# Patient Record
Sex: FEMALE | Race: WHITE | NOT HISPANIC OR LATINO | Employment: PART TIME | URBAN - METROPOLITAN AREA
[De-identification: names, ages, dates, MRNs, and addresses within clinical notes are randomized per-mention and may not be internally consistent; named-entity substitution may affect disease eponyms.]

---

## 2017-01-05 ENCOUNTER — OFFICE VISIT (OUTPATIENT)
Dept: URGENT CARE | Facility: PHYSICIAN GROUP | Age: 37
End: 2017-01-05
Payer: COMMERCIAL

## 2017-01-05 VITALS
DIASTOLIC BLOOD PRESSURE: 80 MMHG | WEIGHT: 170 LBS | HEIGHT: 65 IN | OXYGEN SATURATION: 98 % | TEMPERATURE: 97.8 F | RESPIRATION RATE: 16 BRPM | SYSTOLIC BLOOD PRESSURE: 128 MMHG | HEART RATE: 75 BPM | BODY MASS INDEX: 28.32 KG/M2

## 2017-01-05 DIAGNOSIS — J02.9 EXUDATIVE PHARYNGITIS: ICD-10-CM

## 2017-01-05 PROCEDURE — 99214 OFFICE O/P EST MOD 30 MIN: CPT | Performed by: NURSE PRACTITIONER

## 2017-01-05 RX ORDER — AMOXICILLIN 500 MG/1
500 CAPSULE ORAL 3 TIMES DAILY
Qty: 30 CAP | Refills: 0 | Status: SHIPPED | OUTPATIENT
Start: 2017-01-05 | End: 2018-06-15

## 2017-01-05 ASSESSMENT — ENCOUNTER SYMPTOMS
FEVER: 0
MYALGIAS: 0
COUGH: 0
SWOLLEN GLANDS: 1
STRIDOR: 0
DIARRHEA: 0
WEAKNESS: 1
SPUTUM PRODUCTION: 0
SORE THROAT: 1
TROUBLE SWALLOWING: 1
CHILLS: 0
SHORTNESS OF BREATH: 0
VOMITING: 0
NAUSEA: 0

## 2017-01-05 NOTE — Clinical Note
January 5, 2017       Patient: Pebbles Cosme   YOB: 1980   Date of Visit: 1/5/2017         To Whom It May Concern:    It is my medical opinion that Pebbles Cosme should be off work for 2 days due to illness.     If you have any questions or concerns, please don't hesitate to call [phone]          Sincerely,          PIA Parry.  Electronically Signed

## 2017-01-06 ENCOUNTER — HOSPITAL ENCOUNTER (OUTPATIENT)
Dept: LAB | Facility: MEDICAL CENTER | Age: 37
End: 2017-01-06
Attending: PSYCHIATRY & NEUROLOGY
Payer: COMMERCIAL

## 2017-01-06 DIAGNOSIS — F41.8 SITUATIONAL ANXIETY: ICD-10-CM

## 2017-01-06 PROCEDURE — 80053 COMPREHEN METABOLIC PANEL: CPT

## 2017-01-06 PROCEDURE — 84439 ASSAY OF FREE THYROXINE: CPT

## 2017-01-06 PROCEDURE — 84443 ASSAY THYROID STIM HORMONE: CPT

## 2017-01-06 PROCEDURE — 83036 HEMOGLOBIN GLYCOSYLATED A1C: CPT

## 2017-01-06 PROCEDURE — 85025 COMPLETE CBC W/AUTO DIFF WBC: CPT

## 2017-01-06 PROCEDURE — 36415 COLL VENOUS BLD VENIPUNCTURE: CPT

## 2017-01-06 PROCEDURE — 80061 LIPID PANEL: CPT

## 2017-01-06 NOTE — PATIENT INSTRUCTIONS

## 2017-01-06 NOTE — PROGRESS NOTES
"Subjective:      Pebbles Cosme is a 36 y.o. female who presents with Pharyngitis            HPI Comments: Medications, Allergies and Prior Medical Hx reviewed and updated in [facility].with patient/family today         Pharyngitis   This is a new problem. The current episode started in the past 7 days (2 days). The problem has been unchanged. Maximum temperature: subjective fevers. The pain is at a severity of 5/10. The pain is moderate. Associated symptoms include congestion, ear pain, swollen glands and trouble swallowing. Pertinent negatives include no coughing, diarrhea, ear discharge, shortness of breath, stridor or vomiting. She has tried NSAIDs for the symptoms.       Review of Systems   Constitutional: Positive for malaise/fatigue. Negative for fever and chills.   HENT: Positive for congestion, ear pain, sore throat and trouble swallowing. Negative for ear discharge.    Respiratory: Negative for cough, sputum production, shortness of breath and stridor.    Gastrointestinal: Negative for nausea, vomiting and diarrhea.   Musculoskeletal: Negative for myalgias.   Neurological: Positive for weakness.          Objective:     /80 mmHg  Pulse 75  Temp(Src) 36.6 °C (97.8 °F)  Resp 16  Ht 1.651 m (5' 5\")  Wt 77.111 kg (170 lb)  BMI 28.29 kg/m2  SpO2 98%     Physical Exam   Constitutional: She appears well-developed and well-nourished. No distress.   HENT:   Head: Normocephalic and atraumatic.   Right Ear: Tympanic membrane and ear canal normal.   Left Ear: Tympanic membrane and ear canal normal.   Nose: Rhinorrhea present.   Mouth/Throat: Uvula is midline and mucous membranes are normal. No trismus in the jaw. No uvula swelling. Oropharyngeal exudate, posterior oropharyngeal edema and posterior oropharyngeal erythema present. No tonsillar abscesses.   Eyes: Conjunctivae are normal. Pupils are equal, round, and reactive to light.   Neck: Neck supple.   Cardiovascular: Normal rate, regular rhythm and " normal heart sounds.    Pulmonary/Chest: Effort normal and breath sounds normal. No respiratory distress. She has no wheezes.   Lymphadenopathy:     She has cervical adenopathy.   Neurological: She is alert.   Skin: Skin is warm and dry.   Psychiatric: She has a normal mood and affect. Her behavior is normal.   Vitals reviewed.              Assessment/Plan:     1. Exudative pharyngitis  amoxicillin (AMOXIL) 500 MG Cap       Rapid Strep - neg    D/w pt neg strep swab but sx are compatible with strep; will treat for strep    Letter written for 2 days off of school/work    Epic generated written imformation provided along with verbal instructions for pharyngitis    Rest, Fluids, tylenol, ibuprofen, otc throat lozenges, gargle with warm salt water,   Pt will go to the ER for worsening or changing symptoms as discussed,  Follow-up with your primary care provider or return here if not improving in 5 days   Discharge instructions discussed with pt/family who verbalize understanding and agreement with poc

## 2017-01-07 LAB
ALBUMIN SERPL BCP-MCNC: 4 G/DL (ref 3.2–4.9)
ALBUMIN/GLOB SERPL: 1.7 G/DL
ALP SERPL-CCNC: 49 U/L (ref 30–99)
ALT SERPL-CCNC: 12 U/L (ref 2–50)
ANION GAP SERPL CALC-SCNC: 3 MMOL/L (ref 0–11.9)
AST SERPL-CCNC: 15 U/L (ref 12–45)
BASOPHILS # BLD AUTO: 0.05 K/UL (ref 0–0.12)
BASOPHILS NFR BLD AUTO: 0.6 % (ref 0–1.8)
BILIRUB SERPL-MCNC: 0.2 MG/DL (ref 0.1–1.5)
BUN SERPL-MCNC: 15 MG/DL (ref 8–22)
CALCIUM SERPL-MCNC: 8.6 MG/DL (ref 8.5–10.5)
CHLORIDE SERPL-SCNC: 109 MMOL/L (ref 96–112)
CHOLEST SERPL-MCNC: 150 MG/DL (ref 100–199)
CO2 SERPL-SCNC: 25 MMOL/L (ref 20–33)
CREAT SERPL-MCNC: 0.65 MG/DL (ref 0.5–1.4)
EOSINOPHIL # BLD: 0.13 K/UL (ref 0–0.51)
EOSINOPHIL NFR BLD AUTO: 1.6 % (ref 0–6.9)
ERYTHROCYTE [DISTWIDTH] IN BLOOD BY AUTOMATED COUNT: 44.3 FL (ref 35.9–50)
EST. AVERAGE GLUCOSE BLD GHB EST-MCNC: 103 MG/DL
GLOBULIN SER CALC-MCNC: 2.4 G/DL (ref 1.9–3.5)
GLUCOSE SERPL-MCNC: 99 MG/DL (ref 65–99)
HBA1C MFR BLD: 5.2 % (ref 0–5.6)
HCT VFR BLD AUTO: 40.4 % (ref 37–47)
HDLC SERPL-MCNC: 73 MG/DL
HGB BLD-MCNC: 12.9 G/DL (ref 12–16)
IMM GRANULOCYTES # BLD AUTO: 0.03 K/UL (ref 0–0.11)
IMM GRANULOCYTES NFR BLD AUTO: 0.4 % (ref 0–0.9)
LDLC SERPL CALC-MCNC: 70 MG/DL
LYMPHOCYTES # BLD: 2.74 K/UL (ref 1–4.8)
LYMPHOCYTES NFR BLD AUTO: 34.3 % (ref 22–41)
MCH RBC QN AUTO: 31.6 PG (ref 27–33)
MCHC RBC AUTO-ENTMCNC: 31.9 G/DL (ref 33.6–35)
MCV RBC AUTO: 99 FL (ref 81.4–97.8)
MONOCYTES # BLD: 0.53 K/UL (ref 0–0.85)
MONOCYTES NFR BLD AUTO: 6.6 % (ref 0–13.4)
NEUTROPHILS # BLD: 4.52 K/UL (ref 2–7.15)
NEUTROPHILS NFR BLD AUTO: 56.5 % (ref 44–72)
NRBC # BLD AUTO: 0 K/UL
NRBC BLD-RTO: 0 /100 WBC
PLATELET # BLD AUTO: 318 K/UL (ref 164–446)
PMV BLD AUTO: 9.6 FL (ref 9–12.9)
POTASSIUM SERPL-SCNC: 4.5 MMOL/L (ref 3.6–5.5)
PROT SERPL-MCNC: 6.4 G/DL (ref 6–8.2)
RBC # BLD AUTO: 4.08 M/UL (ref 4.2–5.4)
SODIUM SERPL-SCNC: 137 MMOL/L (ref 135–145)
T4 FREE SERPL-MCNC: 0.95 NG/DL (ref 0.53–1.43)
TRIGL SERPL-MCNC: 35 MG/DL (ref 0–149)
TSH SERPL DL<=0.005 MIU/L-ACNC: 0.92 UIU/ML (ref 0.3–3.7)
WBC # BLD AUTO: 8 K/UL (ref 4.8–10.8)

## 2017-01-13 ENCOUNTER — APPOINTMENT (OUTPATIENT)
Dept: BEHAVIORAL HEALTH | Facility: PHYSICIAN GROUP | Age: 37
End: 2017-01-13
Payer: COMMERCIAL

## 2017-01-27 ENCOUNTER — OFFICE VISIT (OUTPATIENT)
Dept: BEHAVIORAL HEALTH | Facility: PHYSICIAN GROUP | Age: 37
End: 2017-01-27
Payer: COMMERCIAL

## 2017-01-27 DIAGNOSIS — F41.8 SITUATIONAL ANXIETY: ICD-10-CM

## 2017-01-27 PROCEDURE — 99213 OFFICE O/P EST LOW 20 MIN: CPT | Performed by: PSYCHIATRY & NEUROLOGY

## 2017-01-27 RX ORDER — BUSPIRONE HYDROCHLORIDE 10 MG/1
10 TABLET ORAL 3 TIMES DAILY
Qty: 120 TAB | Refills: 3 | Status: SHIPPED | OUTPATIENT
Start: 2017-01-27 | End: 2017-04-03 | Stop reason: SDUPTHER

## 2017-01-27 RX ORDER — ALPRAZOLAM 0.25 MG/1
0.25 TABLET ORAL 2 TIMES DAILY PRN
Qty: 30 TAB | Refills: 1 | Status: SHIPPED | OUTPATIENT
Start: 2017-01-27 | End: 2017-05-05 | Stop reason: SDUPTHER

## 2017-01-27 NOTE — MR AVS SNAPSHOT
Pebbles Vo Ba   2017 9:00 AM   Office Visit   MRN: [id number]    Department:  [facility]   Dept Phone:  [phone]    Description:  Female : 1980   Provider:  Isaias Mckeon M.D.           Reason for Visit     Follow-Up been busy      Allergies as of 2017     No Known Allergies      You were diagnosed with     Situational anxiety   [335847]         Vital Signs     Smoking Status                   Light Tobacco Smoker           Basic Information     Date Of Birth Sex Race Ethnicity Preferred Language    1980 Female White Non- English      Problem List              ICD-10-CM Priority Class Noted - Resolved    H/O LEEP Z98.890   2015 - Present    Family hx of ovarian malignancy Z80.41   2015 - Present    BMI 31.0-31.9,adult Z68.31   2015 - Present    Decreased energy R53.83   2015 - Present    Insomnia G47.00   2015 - Present    Anxiety F41.9   2015 - Present      Health Maintenance        Date Due Completion Dates    IMM DTaP/Tdap/Td Vaccine (1 - Tdap) 1997 ---    IMM INFLUENZA (1) 2016 ---    PAP SMEAR 2017 (Done)    Override on 2014: Done            Current Immunizations     No immunizations on file.      Below and/or attached are the medications your provider expects you to take. Review all of your home medications and newly ordered medications with your provider and/or pharmacist. Follow medication instructions as directed by your provider and/or pharmacist. Please keep your medication list with you and share with your provider. Update the information when medications are discontinued, doses are changed, or new medications (including over-the-counter products) are added; and carry medication information at all times in the event of emergency situations     Allergies:  No Known Allergies          Medications  Valid as of: 2017 -  9:43 AM    Generic Name Brand Name Tablet Size Instructions for use       ALPRAZolam (Tab) XANAX 0.25 MG Take 1 Tab by mouth 2 times a day as needed for Sleep.        Amoxicillin (Cap) AMOXIL 500 MG Take 1 Cap by mouth 3 times a day.        BusPIRone HCl (Tab) BUSPAR 10 MG Take 1 Tab by mouth 3 times a day.        DPH-Lido-AlHydr-MgHydr-Simeth (Suspension) MAGIC MOUTHWASH BLM  10ml gargle and spit every 3 hours as needed. 1/1/1. May substitute.        Terbinafine HCl (Cream) LAMISIL 1 % Apply to affected area twice daily until clear        .                 Medicines prescribed today were sent to:     [id number]  [place], [place] - [address] AT [facility]    [address] [facility] [address]    Phone: [phone] Fax: [phone]    Open 24 Hours?: No      Medication refill instructions:       If your prescription bottle indicates you have medication refills left, it is not necessary to call your provider’s office. Please contact your pharmacy and they will refill your medication.    If your prescription bottle indicates you do not have any refills left, you may request refills at any time through one of the following ways: The online [employer] system (except Urgent Care), by calling your provider’s office, or by asking your pharmacy to contact your provider’s office with a refill request. Medication refills are processed only during regular business hours and may not be available until the next business day. Your provider may request additional information or to have a follow-up visit with you prior to refilling your medication.   *Please Note: Medication refills are assigned a new Rx number when refilled electronically. Your pharmacy may indicate that no refills were authorized even though a new prescription for the same medication is available at the pharmacy. Please request the medicine by name with the pharmacy before contacting your provider for a refill.           [employer] Access Code: Activation code not generated  Current [employer] Status:  Active

## 2017-01-27 NOTE — PROGRESS NOTES
"[facility]  PSYCHIATRIC FOLLOW-UP NOTE    Name: Pebbles Cosme  MRN: [id number]  : 1980  Age: 36 y.o.  Date of assessment: 2017  PCP: Avel Worthy M.D.  Persons in attendance: Patient    REASON FOR VISIT/CHIEF COMPLAINT (as stated by Patient):  Pebbles Cosme is a 36 y.o., White female, attending follow-up appointment for   Chief Complaint   Patient presents with   • Follow-Up     been busy   .      HISTORY OF PRESENT ILLNESS:    Pt is being tx for situational anxiety, compliant with meds, denies SE.  Pt reports the buspar has been quite helpful with the anxiety.  She notes the anxiety is much better these days.  She reports she only takes a 1/2 tab in the morning, 1/2 in the afternoon, and 2tabs in the QHS.  She has been taking less Xanax 0.25 tabs.  She notes she has not typically need the second dose of the Xanax.  She has even taken the one daily to 1/2 tab on a few days this month.  Pt discussed her progress and would like to get to a point where she only need a few doses per month.  Sleep has been improved, notes being so busy at work, just sleeps.  Good appetite.     PSYCHOSOCIAL CHANGES SINCE PREVIOUS CONTACT:  No change    RESPONSE TO TREATMENT:  Improved anxiety with the Buspar    MEDICATION SIDE EFFECTS:  Possible \"mood swings\" some days, but will monitor    REVIEW OF SYSTEMS:        Constitutional negative   Eyes negative   Ears/Nose/Mouth/Throat negative   Cardiovascular negative   Respiratory negative   Gastrointestinal negative   Genitourinary negative   Muscular negative   Integumentary negative   Neurological negative   Endocrine negative   Hematologic/Lymphatic negative       PSYCHIATRIC EXAMINATION/MENTAL STATUS  There were no vitals taken for this visit.  Participation: Active verbal participation  Grooming:Neat  Orientation: Alert and Fully Oriented  Eye contact: Good  Behavior:Calm   Mood: Euthymic  Affect: Flexible and Congruent with content  Thought " "process: Logical and Goal-directed  Thought content:  Within normal limits  Speech: Rate within normal limits and Volume within normal limits  Perception:  Within normal limits  Memory:  No gross evidence of memory deficits  Insight: Good  Judgment: Good  Family/couple interaction observations:   Other:    Current risk:    Suicide: Low   Homicide: Low   Self-harm: Low  Relapse: Low  Other:   Crisis Safety Plan reviewed?Yes  If evidence of imminent risk is present, intervention/plan:    Medical Records/Labs/Diagnostic Tests Reviewed: 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 Situational anxiety           ASSESSMENT AND PLAN:  #Situational Anxiety  Pt reports improvement with the multi-dosing during the day.  Pt typically takes 1/2 tab in the morning and afternnon and then 2 tabs PO QHS.  She notes improvement in overall anxiety.  She has decreased her need of Xanax to one tab or less daily.  Will continue the Buspar at 40mg total daily (pt will only take 30mg at work because it is \"better to be a little anxious\").  Pt takes 40mg daily when not at work.  Will monitor pt progress.  She did get the ACT book, but not yet reading it.  She is employing meditation at work.        More than 50% of face-to-face time in this 30 minute visit was not spent in psychotherapy/counseling.    Topics addressed include:    Isaias Mckeon M.D.                   "

## 2017-04-03 ENCOUNTER — OFFICE VISIT (OUTPATIENT)
Dept: BEHAVIORAL HEALTH | Facility: PHYSICIAN GROUP | Age: 37
End: 2017-04-03
Payer: COMMERCIAL

## 2017-04-03 DIAGNOSIS — F41.8 SITUATIONAL ANXIETY: ICD-10-CM

## 2017-04-03 PROCEDURE — 99214 OFFICE O/P EST MOD 30 MIN: CPT | Performed by: PSYCHIATRY & NEUROLOGY

## 2017-04-03 RX ORDER — BUSPIRONE HYDROCHLORIDE 10 MG/1
10 TABLET ORAL 3 TIMES DAILY
Qty: 120 TAB | Refills: 3 | Status: SHIPPED | OUTPATIENT
Start: 2017-04-03 | End: 2017-05-19 | Stop reason: SDUPTHER

## 2017-04-03 NOTE — MR AVS SNAPSHOT
Pebbles Vo Ba   4/3/2017 3:30 PM   Office Visit   MRN: [id number]    Department:  [facility]   Dept Phone:  [phone]    Description:  Female : 1980   Provider:  Isaias Mckeon M.D.           Reason for Visit     Follow-Up hanging in there, doing ok      Allergies as of 4/3/2017     No Known Allergies      You were diagnosed with     Situational anxiety   [[id number]]         Vital Signs     Smoking Status                   Light Tobacco Smoker           Basic Information     Date Of Birth Sex Race Ethnicity Preferred Language    1980 Female White Non- English      Problem List              ICD-10-CM Priority Class Noted - Resolved    H/O LEEP Z98.890   2015 - Present    Family hx of ovarian malignancy Z80.41   2015 - Present    BMI 31.0-31.9,adult Z68.31   2015 - Present    Decreased energy R53.83   2015 - Present    Insomnia G47.00   2015 - Present    Anxiety F41.9   2015 - Present      Health Maintenance        Date Due Completion Dates    IMM DTaP/Tdap/Td Vaccine (1 - Tdap) 1997 ---    PAP SMEAR 2017 (Done)    Override on 2014: Done            Current Immunizations     No immunizations on file.      Below and/or attached are the medications your provider expects you to take. Review all of your home medications and newly ordered medications with your provider and/or pharmacist. Follow medication instructions as directed by your provider and/or pharmacist. Please keep your medication list with you and share with your provider. Update the information when medications are discontinued, doses are changed, or new medications (including over-the-counter products) are added; and carry medication information at all times in the event of emergency situations     Allergies:  No Known Allergies          Medications  Valid as of: 2017 -  4:12 PM    Generic Name Brand Name Tablet Size Instructions for use    ALPRAZolam (Tab)  XANAX 0.25 MG Take 1 Tab by mouth 2 times a day as needed for Sleep.        Amoxicillin (Cap) AMOXIL 500 MG Take 1 Cap by mouth 3 times a day.        BusPIRone HCl (Tab) BUSPAR 10 MG Take 1 Tab by mouth 3 times a day.        DPH-Lido-AlHydr-MgHydr-Simeth (Suspension) MAGIC MOUTHWASH BLM  10ml gargle and spit every 3 hours as needed. 1/1/1. May substitute.        Terbinafine HCl (Cream) LAMISIL 1 % Apply to affected area twice daily until clear        .                 Medicines prescribed today were sent to:     [id number]  [place], [place] - [address] AT [facility]    [address] [place] [address]    Phone: [phone] Fax: [phone]    Open 24 Hours?: No      Medication refill instructions:       If your prescription bottle indicates you have medication refills left, it is not necessary to call your provider’s office. Please contact your pharmacy and they will refill your medication.    If your prescription bottle indicates you do not have any refills left, you may request refills at any time through one of the following ways: The online [employer] system (except Urgent Care), by calling your provider’s office, or by asking your pharmacy to contact your provider’s office with a refill request. Medication refills are processed only during regular business hours and may not be available until the next business day. Your provider may request additional information or to have a follow-up visit with you prior to refilling your medication.   *Please Note: Medication refills are assigned a new Rx number when refilled electronically. Your pharmacy may indicate that no refills were authorized even though a new prescription for the same medication is available at the pharmacy. Please request the medicine by name with the pharmacy before contacting your provider for a refill.        Your To Do List     Future Labs/Procedures Complete By Expires    CBC WITH DIFFERENTIAL  As directed  4/3/2018         [employer] Access Code: Activation code not generated  Current [employer] Status: Active          Quit Tobacco Information     Do you want to quit using tobacco?    Quitting tobacco decreases risks of cancer, heart and lung disease, increases life expectancy, improves sense of taste and smell, and increases spending money, among other benefits.    If you are thinking about quitting, we can help.  • [facility] Quit Tobacco Program: 292-368-5270  o Program occurs weekly for four weeks and includes pharmacist consultation on products to support quitting smoking or chewing tobacco. A provider referral is needed for pharmacist consultation.  • Tobacco Users Help Hotline: 5-800QUIT-NOW (862-0694) or https://[place].quitlogix.org/  o Free, confidential telephone and online coaching for [place] residents. Sessions are designed on a schedule that is convenient for you. Eligible clients receive free nicotine replacement therapy.  • Nationally: www.smokefree.gov  o Information and professional assistance to support both immediate and long-term needs as you become, and remain, a non-smoker. Smokefree.gov allows you to choose the help that best fits your needs.

## 2017-04-03 NOTE — PROGRESS NOTES
"[facility]  PSYCHIATRIC FOLLOW-UP NOTE    Name: Pebbles Cosme  MRN: [id number]  : 1980  Age: 36 y.o.  Date of assessment: 4/3/2017  PCP: Avel Worthy M.D.  Persons in attendance: Patient    REASON FOR VISIT/CHIEF COMPLAINT (as stated by Patient):  Pebbles Cosme is a 36 y.o., White female, attending follow-up appointment for   Chief Complaint   Patient presents with   • Follow-Up     hanging in there, doing ok   .      HISTORY OF PRESENT ILLNESS:    Pt being tx for situational anxiety.  Pt reports she has been doing pretty well with the current system of alternating the Buspar dosing depending on whether is a work or not.  She uses 30mg total on a work day and 40mg on a non-work day.  She cites being \"too relaxed\" on the 40mg dose for a workday. Notes sleeping pretty well, sleeps through the night. Appetite has been \"fine\", weight has been stable.   Pt has been working on making sure she is snacking better during the day.  Pt notes her FitBit records 28,000 steps per day.      PSYCHOSOCIAL CHANGES SINCE PREVIOUS CONTACT:  No change    RESPONSE TO TREATMENT:  Continues to do    MEDICATION SIDE EFFECTS:  Denies    REVIEW OF SYSTEMS:        Constitutional negative   Eyes negative   Ears/Nose/Mouth/Throat negative   Cardiovascular negative   Respiratory negative   Gastrointestinal negative   Genitourinary negative   Muscular negative   Integumentary negative   Neurological negative   Endocrine negative   Hematologic/Lymphatic negative       PSYCHIATRIC EXAMINATION/MENTAL STATUS  There were no vitals taken for this visit.  Participation: Active verbal participation  Grooming:Neat  Orientation: Alert and Fully Oriented  Eye contact: Good  Behavior:Calm   Mood: Euthymic  Affect: Flexible and Congruent with content  Thought process: Logical and Goal-directed  Thought content:  Within normal limits  Speech: Rate within normal limits and Volume within normal limits  Perception:  Within " "normal limits  Memory:  No gross evidence of memory deficits  Insight: Good  Judgment: Good  Family/couple interaction observations:   Other:    Current risk:    Suicide: Low   Homicide: Low   Self-harm: Low  Relapse: Low  Other:   Crisis Safety Plan reviewed?Yes  If evidence of imminent risk is present, intervention/plan:    Medical Records/Labs/Diagnostic Tests Reviewed: Jan 2017, megolblastic anemia    Medical Records/Labs/Diagnostic Tests Ordered: recheck CBC and Vit D.    DIAGNOSTIC IMPRESSION(S):  1. Situational anxiety           ASSESSMENT AND PLAN:  #Situational Anxiety  Pt reports she continues to do well with the multi-dosing during the day.  Pt typically takes Buspar 30mg PO Daily at work but 40mg when she is not at work.  She notesis because she feels too relaxed at work on the higher dose.  She has decreased her need of Xanax 0.25mg tabs to one tab or less daily.  Will continue the Buspar at 40mg total daily (pt will only take 30mg at work because it is \"better to be a little anxious\").   Will monitor pt progress.   She continues employing meditation at work.       More than 50% of face-to-face time in this 30 minute visit was not spent in psychotherapy/counseling.    Topics addressed include:    Isaias Mckeon M.D.                   "

## 2017-04-04 ENCOUNTER — OFFICE VISIT (OUTPATIENT)
Dept: URGENT CARE | Facility: PHYSICIAN GROUP | Age: 37
End: 2017-04-04
Payer: COMMERCIAL

## 2017-04-04 ENCOUNTER — HOSPITAL ENCOUNTER (OUTPATIENT)
Dept: LAB | Facility: MEDICAL CENTER | Age: 37
End: 2017-04-04
Attending: PSYCHIATRY & NEUROLOGY
Payer: COMMERCIAL

## 2017-04-04 ENCOUNTER — HOSPITAL ENCOUNTER (OUTPATIENT)
Dept: LAB | Facility: MEDICAL CENTER | Age: 37
End: 2017-04-04
Attending: OBSTETRICS & GYNECOLOGY
Payer: COMMERCIAL

## 2017-04-04 VITALS
DIASTOLIC BLOOD PRESSURE: 78 MMHG | BODY MASS INDEX: 29.16 KG/M2 | TEMPERATURE: 98.1 F | HEART RATE: 60 BPM | OXYGEN SATURATION: 100 % | HEIGHT: 65 IN | WEIGHT: 175 LBS | SYSTOLIC BLOOD PRESSURE: 126 MMHG

## 2017-04-04 DIAGNOSIS — J06.9 ACUTE URI: ICD-10-CM

## 2017-04-04 DIAGNOSIS — J02.9 SORE THROAT: Primary | ICD-10-CM

## 2017-04-04 DIAGNOSIS — F41.8 SITUATIONAL ANXIETY: ICD-10-CM

## 2017-04-04 LAB
25(OH)D3 SERPL-MCNC: 11 NG/ML (ref 30–100)
BASOPHILS # BLD AUTO: 0.8 % (ref 0–1.8)
BASOPHILS # BLD: 0.06 K/UL (ref 0–0.12)
EOSINOPHIL # BLD AUTO: 0.14 K/UL (ref 0–0.51)
EOSINOPHIL NFR BLD: 1.8 % (ref 0–6.9)
ERYTHROCYTE [DISTWIDTH] IN BLOOD BY AUTOMATED COUNT: 42.5 FL (ref 35.9–50)
HBV SURFACE AG SER QL: NEGATIVE
HCT VFR BLD AUTO: 41.3 % (ref 37–47)
HCV AB SER QL: NEGATIVE
HGB BLD-MCNC: 13.5 G/DL (ref 12–16)
HIV 1+2 AB+HIV1 P24 AG SERPL QL IA: NON REACTIVE
IMM GRANULOCYTES # BLD AUTO: 0.03 K/UL (ref 0–0.11)
IMM GRANULOCYTES NFR BLD AUTO: 0.4 % (ref 0–0.9)
INT CON NEG: NEGATIVE
INT CON POS: POSITIVE
LYMPHOCYTES # BLD AUTO: 2.64 K/UL (ref 1–4.8)
LYMPHOCYTES NFR BLD: 33.9 % (ref 22–41)
MCH RBC QN AUTO: 31.5 PG (ref 27–33)
MCHC RBC AUTO-ENTMCNC: 32.7 G/DL (ref 33.6–35)
MCV RBC AUTO: 96.3 FL (ref 81.4–97.8)
MONOCYTES # BLD AUTO: 0.41 K/UL (ref 0–0.85)
MONOCYTES NFR BLD AUTO: 5.3 % (ref 0–13.4)
NEUTROPHILS # BLD AUTO: 4.5 K/UL (ref 2–7.15)
NEUTROPHILS NFR BLD: 57.8 % (ref 44–72)
NRBC # BLD AUTO: 0 K/UL
NRBC BLD AUTO-RTO: 0 /100 WBC
PLATELET # BLD AUTO: 331 K/UL (ref 164–446)
PMV BLD AUTO: 9.5 FL (ref 9–12.9)
RBC # BLD AUTO: 4.29 M/UL (ref 4.2–5.4)
S PYO AG THROAT QL: NORMAL
TREPONEMA PALLIDUM IGG+IGM AB [PRESENCE] IN SERUM OR PLASMA BY IMMUNOASSAY: NON REACTIVE
WBC # BLD AUTO: 7.8 K/UL (ref 4.8–10.8)

## 2017-04-04 PROCEDURE — 99214 OFFICE O/P EST MOD 30 MIN: CPT | Performed by: PHYSICIAN ASSISTANT

## 2017-04-04 PROCEDURE — 87880 STREP A ASSAY W/OPTIC: CPT | Performed by: PHYSICIAN ASSISTANT

## 2017-04-04 PROCEDURE — 87340 HEPATITIS B SURFACE AG IA: CPT

## 2017-04-04 PROCEDURE — 86780 TREPONEMA PALLIDUM: CPT

## 2017-04-04 PROCEDURE — 82306 VITAMIN D 25 HYDROXY: CPT

## 2017-04-04 PROCEDURE — 85025 COMPLETE CBC W/AUTO DIFF WBC: CPT

## 2017-04-04 PROCEDURE — 87389 HIV-1 AG W/HIV-1&-2 AB AG IA: CPT

## 2017-04-04 PROCEDURE — 36415 COLL VENOUS BLD VENIPUNCTURE: CPT

## 2017-04-04 PROCEDURE — 86803 HEPATITIS C AB TEST: CPT

## 2017-04-04 ASSESSMENT — ENCOUNTER SYMPTOMS
FEVER: 0
MYALGIAS: 1
SPUTUM PRODUCTION: 0
COUGH: 1
HOARSE VOICE: 1
TROUBLE SWALLOWING: 0
SORE THROAT: 1
SWOLLEN GLANDS: 1

## 2017-04-04 NOTE — Clinical Note
April 4, 2017         Patient: Pebbles Cosme   YOB: 1980   Date of Visit: 4/4/2017           To Whom it May Concern:    Pebbles Cosme was seen in my clinic on 4/4/2017. She may return to work on 04/06/2017.    If you have any questions or concerns, please don't hesitate to call.        Sincerely,           Ana Rodriguez PA-C  Electronically Signed

## 2017-04-04 NOTE — PROGRESS NOTES
"Subjective:      Pebbles Cosme is a 36 y.o. female who presents with Pharyngitis    Pt PMH, SocHx, SurgHx, FamHx, Drug allergies and medications reviewed with pt/EPIC.      Family history reviewed, it is not pertinent to this complaint.            HPI Comments: Patient presents with:  Pharyngitis: sore throat, cough x 3 days        Pharyngitis   This is a new problem. The current episode started in the past 7 days. The problem has been unchanged. There has been no fever. The pain is moderate. Associated symptoms include congestion, coughing, a hoarse voice, a plugged ear sensation and swollen glands. Pertinent negatives include no trouble swallowing. She has tried NSAIDs and cool liquids for the symptoms. The treatment provided mild relief.       Review of Systems   Constitutional: Positive for malaise/fatigue. Negative for fever.   HENT: Positive for congestion, hoarse voice and sore throat. Negative for trouble swallowing.    Respiratory: Positive for cough. Negative for sputum production.    Musculoskeletal: Positive for myalgias.   All other systems reviewed and are negative.         Objective:     /78 mmHg  Pulse 60  Temp(Src) 36.7 °C (98.1 °F)  Ht 1.651 m (5' 5\")  Wt 79.379 kg (175 lb)  BMI 29.12 kg/m2  SpO2 100%     Physical Exam   Constitutional: She is oriented to person, place, and time. She appears well-developed and well-nourished.   HENT:   Head: Normocephalic and atraumatic.   Right Ear: Tympanic membrane normal.   Left Ear: Tympanic membrane normal.   Nose: Rhinorrhea present.   Mouth/Throat: Uvula is midline. Posterior oropharyngeal edema present. No oropharyngeal exudate or posterior oropharyngeal erythema.       Eyes: EOM are normal. Pupils are equal, round, and reactive to light.   Neck: Normal range of motion. Neck supple.   Cardiovascular: Normal rate, regular rhythm and normal heart sounds.    Pulmonary/Chest: Effort normal and breath sounds normal. No respiratory distress. "   Abdominal: Soft.   Musculoskeletal: Normal range of motion.   Neurological: She is alert and oriented to person, place, and time.   Skin: Skin is warm and dry.   Vitals reviewed.         Rapid strep: negative  Mono:     Assessment/Plan:     1. Sore throat  POCT Rapid Strep A    POCT Mononucleosis (mono)    CULTURE THROAT   2. Acute URI  POCT Mononucleosis (mono)    CULTURE THROAT   Discussed that I felt this was viral in nature. Did not see any evidence of a bacterial process, but will send throat culture. Discussed natural progression and sx care.    Throat culture sent, will call with any change in treatment if necessary.     PT should follow up with PCP in 1-2 days for re-evaluation if symptoms have not improved.  Discussed red flags and reasons to return to UC or ED.  Pt and/or family verbalized understanding of diagnosis and follow up instructions and was given informational handout on diagnosis.  PT discharged.

## 2017-04-05 ENCOUNTER — HOSPITAL ENCOUNTER (OUTPATIENT)
Facility: MEDICAL CENTER | Age: 37
End: 2017-04-05
Attending: PHYSICIAN ASSISTANT
Payer: COMMERCIAL

## 2017-04-05 DIAGNOSIS — J02.9 SORE THROAT: ICD-10-CM

## 2017-04-05 DIAGNOSIS — J06.9 ACUTE URI: ICD-10-CM

## 2017-04-05 PROCEDURE — 87070 CULTURE OTHR SPECIMN AEROBIC: CPT

## 2017-04-07 LAB
BACTERIA SPEC RESP CULT: NORMAL
SIGNIFICANT IND 70042: NORMAL
SOURCE SOURCE: NORMAL

## 2017-04-27 ENCOUNTER — HOSPITAL ENCOUNTER (OUTPATIENT)
Dept: LAB | Facility: MEDICAL CENTER | Age: 37
End: 2017-04-27
Attending: OBSTETRICS & GYNECOLOGY
Payer: COMMERCIAL

## 2017-04-27 LAB — CANCER AG125 SERPL-ACNC: 31.3 U/ML (ref 0–35)

## 2017-04-27 PROCEDURE — 86304 IMMUNOASSAY TUMOR CA 125: CPT

## 2017-04-27 PROCEDURE — 36415 COLL VENOUS BLD VENIPUNCTURE: CPT

## 2017-05-01 ENCOUNTER — TELEPHONE (OUTPATIENT)
Dept: BEHAVIORAL HEALTH | Facility: PHYSICIAN GROUP | Age: 37
End: 2017-05-01

## 2017-05-01 NOTE — TELEPHONE ENCOUNTER
Attempted to reach pt to discuss her low Vit D levels and treatments    No answer  Brief message with my name left.

## 2017-05-04 ENCOUNTER — HOSPITAL ENCOUNTER (OUTPATIENT)
Dept: RADIOLOGY | Facility: MEDICAL CENTER | Age: 37
End: 2017-05-04
Attending: OBSTETRICS & GYNECOLOGY
Payer: COMMERCIAL

## 2017-05-04 DIAGNOSIS — Z80.41 FAMILY HISTORY OF MALIGNANT NEOPLASM OF OVARY: ICD-10-CM

## 2017-05-04 PROCEDURE — 76830 TRANSVAGINAL US NON-OB: CPT

## 2017-05-05 DIAGNOSIS — F41.8 SITUATIONAL ANXIETY: ICD-10-CM

## 2017-05-05 RX ORDER — ALPRAZOLAM 0.25 MG/1
0.25 TABLET ORAL
Qty: 30 TAB | Refills: 0 | Status: SHIPPED
Start: 2017-05-05 | End: 2017-05-19 | Stop reason: SDUPTHER

## 2017-05-19 ENCOUNTER — OFFICE VISIT (OUTPATIENT)
Dept: BEHAVIORAL HEALTH | Facility: PHYSICIAN GROUP | Age: 37
End: 2017-05-19
Payer: COMMERCIAL

## 2017-05-19 DIAGNOSIS — F41.8 SITUATIONAL ANXIETY: ICD-10-CM

## 2017-05-19 PROCEDURE — 99213 OFFICE O/P EST LOW 20 MIN: CPT | Performed by: PSYCHIATRY & NEUROLOGY

## 2017-05-19 RX ORDER — BUSPIRONE HYDROCHLORIDE 10 MG/1
10 TABLET ORAL 3 TIMES DAILY
Qty: 120 TAB | Refills: 3 | Status: SHIPPED | OUTPATIENT
Start: 2017-05-19 | End: 2017-09-15

## 2017-05-19 RX ORDER — ALPRAZOLAM 0.25 MG/1
0.25 TABLET ORAL
Qty: 30 TAB | Refills: 0 | Status: SHIPPED | OUTPATIENT
Start: 2017-06-05 | End: 2017-07-11 | Stop reason: SDUPTHER

## 2017-05-19 NOTE — MR AVS SNAPSHOT
Pebbles Nelsoner   2017 3:00 PM   Office Visit   MRN: [id number]    Department:  [facility]   Dept Phone:  [phone]    Description:  Female : 1980   Provider:  Isaias Mckeon M.D.           Reason for Visit     Follow-Up it has been a little stressful      Allergies as of 2017     No Known Allergies      You were diagnosed with     Situational anxiety   [[id number]]         Vital Signs     Smoking Status                   Light Tobacco Smoker           Basic Information     Date Of Birth Sex Race Ethnicity Preferred Language    1980 Female White Non- English      Problem List              ICD-10-CM Priority Class Noted - Resolved    H/O LEEP Z98.890   2015 - Present    Family hx of ovarian malignancy Z80.41   2015 - Present    BMI 31.0-31.9,adult Z68.31   2015 - Present    Decreased energy R53.83   2015 - Present    Insomnia G47.00   2015 - Present    Anxiety F41.9   2015 - Present      Health Maintenance        Date Due Completion Dates    IMM DTaP/Tdap/Td Vaccine (1 - Tdap) 1997 ---    PAP SMEAR 2017 (Done)    Override on 2014: Done            Current Immunizations     No immunizations on file.      Below and/or attached are the medications your provider expects you to take. Review all of your home medications and newly ordered medications with your provider and/or pharmacist. Follow medication instructions as directed by your provider and/or pharmacist. Please keep your medication list with you and share with your provider. Update the information when medications are discontinued, doses are changed, or new medications (including over-the-counter products) are added; and carry medication information at all times in the event of emergency situations     Allergies:  No Known Allergies          Medications  Valid as of: May 19, 2017 -  3:33 PM    Generic Name Brand Name Tablet Size Instructions for use    ALPRAZolam  (Tab) XANAX 0.25 MG Take 1 Tab by mouth 1 time daily as needed for Sleep.        Amoxicillin (Cap) AMOXIL 500 MG Take 1 Cap by mouth 3 times a day.        BusPIRone HCl (Tab) BUSPAR 10 MG Take 1 Tab by mouth 3 times a day.        DPH-Lido-AlHydr-MgHydr-Simeth (Suspension) MAGIC MOUTHWASH BLM  10ml gargle and spit every 3 hours as needed. 1/1/1. May substitute.        Terbinafine HCl (Cream) LAMISIL 1 % Apply to affected area twice daily until clear        .                 Medicines prescribed today were sent to:     [id number]  [facility] [place], [place] - [address] [facility]    [address] [place] [address]    Phone: [phone] Fax: [phone]    Open 24 Hours?: No      Medication refill instructions:       If your prescription bottle indicates you have medication refills left, it is not necessary to call your provider’s office. Please contact your pharmacy and they will refill your medication.    If your prescription bottle indicates you do not have any refills left, you may request refills at any time through one of the following ways: The online [employer] system (except Urgent Care), by calling your provider’s office, or by asking your pharmacy to contact your provider’s office with a refill request. Medication refills are processed only during regular business hours and may not be available until the next business day. Your provider may request additional information or to have a follow-up visit with you prior to refilling your medication.   *Please Note: Medication refills are assigned a new Rx number when refilled electronically. Your pharmacy may indicate that no refills were authorized even though a new prescription for the same medication is available at the pharmacy. Please request the medicine by name with the pharmacy before contacting your provider for a refill.           [employer] Access Code: Activation code not generated  Current [employer] Status: Active          Quit  Tobacco Information     Do you want to quit using tobacco?    Quitting tobacco decreases risks of cancer, heart and lung disease, increases life expectancy, improves sense of taste and smell, and increases spending money, among other benefits.    If you are thinking about quitting, we can help.  • [facility] Quit Tobacco Program: 527.268.4564  o Program occurs weekly for four weeks and includes pharmacist consultation on products to support quitting smoking or chewing tobacco. A provider referral is needed for pharmacist consultation.  • Tobacco Users Help Hotline: 9-146-QUIT-NOW (500-8628) or https://[place].quitlogix.org/  o Free, confidential telephone and online coaching for [place] residents. Sessions are designed on a schedule that is convenient for you. Eligible clients receive free nicotine replacement therapy.  • Nationally: www.smokefree.gov  o Information and professional assistance to support both immediate and long-term needs as you become, and remain, a non-smoker. Smokefree.gov allows you to choose the help that best fits your needs.

## 2017-05-19 NOTE — PROGRESS NOTES
[facility]  PSYCHIATRIC FOLLOW-UP NOTE    Name: Pebbles Cosme  MRN: [id number]  : 1980  Age: 36 y.o.  Date of assessment: 2017  PCP: Avel Worthy M.D.  Persons in attendance: Patient    REASON FOR VISIT/CHIEF COMPLAINT (as stated by Patient):  Pebbles Cosme is a 36 y.o., White female, attending follow-up appointment for   Chief Complaint   Patient presents with   • Follow-Up     it has been a little stressful   .      HISTORY OF PRESENT ILLNESS:    Pt being tx for anxiety, compliant with meds, Denies SE.  Pt reports she has been under stress from finding an irregular Pap smear.  She has since had a questionable LEEP that has required a f/u LEEP.  Pt has been quite stressed about the idea of what may come next and has been thinking/talking about the options with OB.  Pt will get into a genetic counselor via the OB.  Pt notes she is sleeping and eating well.  Discussed ACT principles around the medical stress she has experienced.       PSYCHOSOCIAL CHANGES SINCE PREVIOUS CONTACT:  No change    RESPONSE TO TREATMENT:  Doing well with regimen    MEDICATION SIDE EFFECTS:  Denies    REVIEW OF SYSTEMS:        Constitutional negative   Eyes negative   Ears/Nose/Mouth/Throat negative   Cardiovascular negative   Respiratory negative   Gastrointestinal negative   Genitourinary -recent LEEP   Muscular negative   Integumentary negative   Neurological negative   Endocrine negative   Hematologic/Lymphatic negative       PSYCHIATRIC EXAMINATION/MENTAL STATUS  There were no vitals taken for this visit.  Participation: Active verbal participation  Grooming:Neat  Orientation: Alert and Fully Oriented  Eye contact: Good  Behavior:Calm   Mood: Euthymic  Affect: Flexible and Congruent with content  Thought process: Logical and Goal-directed  Thought content:  Within normal limits  Speech: Rate within normal limits and Volume within normal limits  Perception:  Within normal limits  Memory:  No  gross evidence of memory deficits  Insight: Good  Judgment: Good  Family/couple interaction observations:   Other:    Current risk:    Suicide: Low   Homicide: Low   Self-harm: Low  Relapse: Low  Other:   Crisis Safety Plan reviewed?Yes  If evidence of imminent risk is present, intervention/plan:    Medical Records/Labs/Diagnostic Tests Reviewed: Low Vit D on 4/2017    Medical Records/Labs/Diagnostic Tests Ordered: none    DIAGNOSTIC IMPRESSION(S):  1. Situational anxiety           ASSESSMENT AND PLAN:  #Situational Anxiety  Pt reports she continues to do well with the multi-dosing of Buspar during the day.  Pt typically takes Buspar 30mg PO Daily at work but 40mg per day when she is not at work.  She reports having some increased stress from dealing with a recent irregular Pap smear.  She had on LEEP and has to have a f/u LEEP before the OB/GYN decides the course of action.  Discussed ACT principles around managing this type of thought process.  Pt already has the ACT but encouraged her to begin reading it   Will monitor pt progress.   She continues employing meditation at work.   Pt has been stretching the Klonopin 0.25mg tabs, often only taking a half tab when she is feeling anxious.  Discussed incorporating the ACT techniques and continuing to rely less on the Klonopin.  Will provide #30 mor 0.25mg tabs of Klonopin with agreement that pt stretches this out 2months from now    More than 50% of face-to-face time in this 30 minute visit was not spent in psychotherapy/counseling.    Topics addressed include:    Isaias Mckeon M.D.

## 2017-05-25 ENCOUNTER — OFFICE VISIT (OUTPATIENT)
Dept: URGENT CARE | Facility: PHYSICIAN GROUP | Age: 37
End: 2017-05-25

## 2017-05-25 VITALS
TEMPERATURE: 98.4 F | HEART RATE: 60 BPM | OXYGEN SATURATION: 98 % | SYSTOLIC BLOOD PRESSURE: 116 MMHG | BODY MASS INDEX: 32.15 KG/M2 | WEIGHT: 193 LBS | DIASTOLIC BLOOD PRESSURE: 78 MMHG | HEIGHT: 65 IN

## 2017-05-25 DIAGNOSIS — Z02.4 DRIVER'S PERMIT PHYSICAL EXAMINATION: ICD-10-CM

## 2017-05-25 PROCEDURE — 7100 PR DOT PHYSICAL: Performed by: EMERGENCY MEDICINE

## 2017-05-25 ASSESSMENT — ENCOUNTER SYMPTOMS
COUGH: 0
EYE REDNESS: 0
SENSORY CHANGE: 0
NECK PAIN: 0
FEVER: 0
EYE DISCHARGE: 0
NAUSEA: 0
ABDOMINAL PAIN: 0
VOMITING: 0

## 2017-05-25 NOTE — MR AVS SNAPSHOT
"        Pebbles Vo Ba   2017 8:50 AM   Office Visit   MRN: [id number]    Department:  [facility]   Dept Phone:  [phone]    Description:  Female : 1980   Provider:  LEONA Caceres M.D.           Reason for Visit     Employment Physical DOT      Allergies as of 2017     No Known Allergies      You were diagnosed with     's permit physical examination   [[id number]]         Vital Signs     Blood Pressure Pulse Temperature Height Weight Body Mass Index    116/78 mmHg 60 36.9 °C (98.4 °F) 1.651 m (5' 5\") 87.544 kg (193 lb) 32.12 kg/m2    Oxygen Saturation Smoking Status                98% Light Tobacco Smoker          Basic Information     Date Of Birth Sex Race Ethnicity Preferred Language    1980 Female White Non- English      Problem List              ICD-10-CM Priority Class Noted - Resolved    H/O LEEP Z98.890   2015 - Present    Family hx of ovarian malignancy Z80.41   2015 - Present    BMI 31.0-31.9,adult Z68.31   2015 - Present    Decreased energy R53.83   2015 - Present    Insomnia G47.00   2015 - Present    Anxiety F41.9   2015 - Present      Health Maintenance        Date Due Completion Dates    IMM DTaP/Tdap/Td Vaccine (1 - Tdap) 1997 ---    PAP SMEAR 2017 (Done)    Override on 2014: Done            Current Immunizations     No immunizations on file.      Below and/or attached are the medications your provider expects you to take. Review all of your home medications and newly ordered medications with your provider and/or pharmacist. Follow medication instructions as directed by your provider and/or pharmacist. Please keep your medication list with you and share with your provider. Update the information when medications are discontinued, doses are changed, or new medications (including over-the-counter products) are added; and carry medication information at all times in the event of emergency situations    " Allergies:  No Known Allergies          Medications  Valid as of: May 25, 2017 -  1:08 PM    Generic Name Brand Name Tablet Size Instructions for use    ALPRAZolam (Tab) XANAX 0.25 MG Take 1 Tab by mouth 1 time daily as needed for Sleep.        Amoxicillin (Cap) AMOXIL 500 MG Take 1 Cap by mouth 3 times a day.        BusPIRone HCl (Tab) BUSPAR 10 MG Take 1 Tab by mouth 3 times a day.        DPH-Lido-AlHydr-MgHydr-Simeth (Suspension) MAGIC MOUTHWASH BLM  10ml gargle and spit every 3 hours as needed. 1/1/1. May substitute.        Terbinafine HCl (Cream) LAMISIL 1 % Apply to affected area twice daily until clear        .                 Medicines prescribed today were sent to:     [email or web address] [address] [place] [address], [place] - [address] [facility]    [address] [place] [address]    Phone: [phone] Fax: [phone]    Open 24 Hours?: No      Medication refill instructions:       If your prescription bottle indicates you have medication refills left, it is not necessary to call your provider’s office. Please contact your pharmacy and they will refill your medication.    If your prescription bottle indicates you do not have any refills left, you may request refills at any time through one of the following ways: The online [facility] system (except Urgent Care), by calling your provider’s office, or by asking your pharmacy to contact your provider’s office with a refill request. Medication refills are processed only during regular business hours and may not be available until the next business day. Your provider may request additional information or to have a follow-up visit with you prior to refilling your medication.   *Please Note: Medication refills are assigned a new Rx number when refilled electronically. Your pharmacy may indicate that no refills were authorized even though a new prescription for the same medication is available at the pharmacy. Please request the medicine by name with the  pharmacy before contacting your provider for a refill.           MyChart Access Code: Activation code not generated  Current MyChart Status: Active          Quit Tobacco Information     Do you want to quit using tobacco?    Quitting tobacco decreases risks of cancer, heart and lung disease, increases life expectancy, improves sense of taste and smell, and increases spending money, among other benefits.    If you are thinking about quitting, we can help.  • [facility]uit Tobacco Program: 270.302.8778  o Program occurs weekly for four weeks and includes pharmacist consultation on products to support quitting smoking or chewing tobacco. A provider referral is needed for pharmacist consultation.  • Tobacco Users Help Hotline: 1-177-QUIT-NOW (885-3498) or https://[place].quitlogix.org/  o Free, confidential telephone and online coaching for [place] residents. Sessions are designed on a schedule that is convenient for you. Eligible clients receive free nicotine replacement therapy.  • Nationally: www.smokefree.gov  o Information and professional assistance to support both immediate and long-term needs as you become, and remain, a non-smoker. Smokefree.gov allows you to choose the help that best fits your needs.

## 2017-05-25 NOTE — PROGRESS NOTES
Subjective:      Pebbles Cosme is a 37 y.o. 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 Please review the history and physical on her [employer] physical.  No Known Allergies   Social History     Social History   • Marital Status: Single     Spouse Name: N/A   • Number of Children: N/A   • Years of Education: N/A     Occupational History   • Not on file.     Social History Main Topics   • Smoking status: Light Tobacco Smoker -- 0.25 packs/day     Types: Cigarettes   • Smokeless tobacco: Never Used   • Alcohol Use: Yes   • Drug Use: No   • Sexual Activity:     Partners: Male     Other Topics Concern   • Not on file     Social History Narrative     Past Medical History   Diagnosis Date   • Anxiety      Current Outpatient Prescriptions on File Prior to Visit   Medication Sig Dispense Refill   • busPIRone (BUSPAR) 10 MG Tab Take 1 Tab by mouth 3 times a day. 120 Tab 3   • [START ON 6/5/2017] alprazolam (XANAX) 0.25 MG Tab Take 1 Tab by mouth 1 time daily as needed for Sleep. 30 Tab 0   • amoxicillin (AMOXIL) 500 MG Cap Take 1 Cap by mouth 3 times a day. 30 Cap 0   • DPH-Lido-AlHydr-MgHydr-Simeth (FIRST-MOUTHWASH BLM) SUSP 10ml gargle and spit every 3 hours as needed. 1/1/1. May substitute. 240 mL 0   • terbinafine (LAMISIL AT) 1 % cream Apply to affected area twice daily until clear 1 Tube 0     No current facility-administered medications on file prior to visit.     Family History   Problem Relation Age of Onset   • Cancer Mother 45     ovarian ca   • Hypertension Father    • Heart Disease Father    • Hyperlipidemia Father      Review of Systems   Constitutional: Negative for fever.   Eyes: Negative for discharge and redness.   Respiratory: Negative for cough.    Cardiovascular: Negative for chest pain.  "  Gastrointestinal: Negative for nausea, vomiting and abdominal pain.   Genitourinary: Negative for dysuria.        Patient denies pregnancy.   Musculoskeletal: Negative for neck pain.   Skin: Negative for itching and rash.   Neurological: Negative for sensory change.          Objective:     /78 mmHg  Pulse 60  Temp(Src) 36.9 °C (98.4 °F)  Ht 1.651 m (5' 5\")  Wt 87.544 kg (193 lb)  BMI 32.12 kg/m2  SpO2 98%     Physical Exam   Constitutional: She is oriented to person, place, and time. No distress.   HENT:   Head: Normocephalic and atraumatic.   Right Ear: External ear normal.   Left Ear: External ear normal.   Nose: Nose normal.   Eyes: Conjunctivae and EOM are normal. Left eye exhibits no discharge.   Neck: No tracheal deviation present. No thyromegaly present.   Cardiovascular: Normal rate.    No murmur heard.  Pulmonary/Chest: Effort normal. No respiratory distress.   Abdominal: She exhibits no distension. There is no tenderness.   Musculoskeletal: She exhibits no edema or tenderness.   Neurological: She is oriented to person, place, and time.   Skin: Skin is warm and dry. She is not diaphoretic. No erythema.   Psychiatric: She has a normal mood and affect.               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     Please refer to history and physical in the chart.      "

## 2017-06-22 ENCOUNTER — TELEPHONE (OUTPATIENT)
Dept: URGENT CARE | Facility: PHYSICIAN GROUP | Age: 37
End: 2017-06-22

## 2017-06-22 NOTE — TELEPHONE ENCOUNTER
Patient will bring her paperwork into the urgent care and we will make the correction. I gave her the dates a work for the next week.

## 2017-06-22 NOTE — TELEPHONE ENCOUNTER
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 Pt employer states that the wrong [place] was checked and it needed to be the first option. Please advise..

## 2017-07-11 DIAGNOSIS — F41.8 SITUATIONAL ANXIETY: ICD-10-CM

## 2017-07-11 NOTE — TELEPHONE ENCOUNTER
Was the patient seen in the last year in this department? Yes     Does patient have an active prescription for medications requested? Yes     Received Request Via: Pharmacy       FYI: Dr. Mckeon Pt

## 2017-07-14 RX ORDER — ALPRAZOLAM 0.25 MG/1
0.25 TABLET ORAL
Qty: 21 TAB | Refills: 0 | Status: SHIPPED | OUTPATIENT
Start: 2017-07-14 | End: 2017-07-17 | Stop reason: SDUPTHER

## 2017-07-17 DIAGNOSIS — F41.8 SITUATIONAL ANXIETY: ICD-10-CM

## 2017-07-17 RX ORDER — ALPRAZOLAM 0.25 MG/1
0.25 TABLET ORAL
Qty: 21 TAB | Refills: 0 | OUTPATIENT
Start: 2017-07-17 | End: 2017-08-07 | Stop reason: SDUPTHER

## 2017-08-07 ENCOUNTER — OFFICE VISIT (OUTPATIENT)
Dept: BEHAVIORAL HEALTH | Facility: PHYSICIAN GROUP | Age: 37
End: 2017-08-07
Payer: COMMERCIAL

## 2017-08-07 DIAGNOSIS — F41.1 GENERALIZED ANXIETY DISORDER: ICD-10-CM

## 2017-08-07 DIAGNOSIS — F43.21 ADJUSTMENT DISORDER WITH DEPRESSED MOOD: ICD-10-CM

## 2017-08-07 PROCEDURE — 90791 PSYCH DIAGNOSTIC EVALUATION: CPT | Performed by: SOCIAL WORKER

## 2017-08-07 PROCEDURE — 99214 OFFICE O/P EST MOD 30 MIN: CPT | Performed by: STUDENT IN AN ORGANIZED HEALTH CARE EDUCATION/TRAINING PROGRAM

## 2017-08-07 RX ORDER — ALPRAZOLAM 0.25 MG/1
0.25 TABLET ORAL
Qty: 15 TAB | Refills: 0
Start: 2017-08-07 | End: 2017-09-15 | Stop reason: SDUPTHER
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
## 2017-08-07 NOTE — MR AVS SNAPSHOT
Pebbles Vo Ba   2017 10:30 AM   Office Visit   MRN: [id number]    Department[facility]  [facility]    Dept Phone:  [phone]    Description:  Female : 1980   Provider:  Alvaro Vasquez M.D.           Allergies as of 2017     No Known Allergies      Vital Signs     Smoking Status                   Light Tobacco Smoker           Basic Information     Date Of Birth Sex Race Ethnicity Preferred Language    1980 Female White Non- English      Your appointments     Aug 07, 2017  1:00 PM   Initial Behavioral Health Eval with Nyla Hou L.C.S.W.   [facility] [place] [place]    [address]  [address]  [place] [id number] [phone]           30 MIN ARRIVAL PRIOR TO SCHEDULED APPOINTMENT IS REQUIRED. Your appointment will be rescheduled if you arrive later than 30 min before the appointed time.            Sep 15, 2017  1:30 PM   Follow Up Med Management with Alvaro Vasquez M.D.   [facility] [address]    [address] [place] [address]  [address]  [place] [address]   [phone]              Problem List              ICD-10-CM Priority Class Noted - Resolved    H/O LEEP Z98.890   2015 - Present    Family hx of ovarian malignancy Z80.41   2015 - Present    BMI 31.0-31.9,adult Z68.31   2015 - Present    Decreased energy R53.83   2015 - Present    Insomnia G47.00   2015 - Present    Anxiety F41.9   2015 - Present      Health Maintenance        Date Due Completion Dates    IMM DTaP/Tdap/Td Vaccine (1 - Tdap) 1997 ---    PAP SMEAR 2017 (Done)    Override on 2014: Done    IMM INFLUENZA (1) 2017 ---            Current Immunizations     No immunizations on file.      Below and/or attached are the medications your provider expects you to take. Review all of your home medications and newly ordered medications with your provider and/or pharmacist. Follow medication instructions as directed by your provider and/or pharmacist.  Please keep your medication list with you and share with your provider. Update the information when medications are discontinued, doses are changed, or new medications (including over-the-counter products) are added; and carry medication information at all times in the event of emergency situations     Allergies:  No Known Allergies          Medications  Valid as of: August 07, 2017 - 11:46 AM    Generic Name Brand Name Tablet Size Instructions for use    ALPRAZolam (Tab) XANAX 0.25 MG Take 1 Tab by mouth 1 time daily as needed for Sleep.        Amoxicillin (Cap) AMOXIL 500 MG Take 1 Cap by mouth 3 times a day.        BusPIRone HCl (Tab) BUSPAR 10 MG Take 1 Tab by mouth 3 times a day.        DPH-Lido-AlHydr-MgHydr-Simeth (Suspension) MAGIC MOUTHWASH BLM  10ml gargle and spit every 3 hours as needed. 1/1/1. May substitute.        Terbinafine HCl (Cream) LAMISIL 1 % Apply to affected area twice daily until clear        .                 Medicines prescribed today were sent to:     [facility] [address] [place] [address] - [facility] [address] [facility]    [address] [facility] [address]    Phone: [phone] Fax: [phone]    Open 24 Hours?: No      Medication refill instructions:       If your prescription bottle indicates you have medication refills left, it is not necessary to call your provider’s office. Please contact your pharmacy and they will refill your medication.    If your prescription bottle indicates you do not have any refills left, you may request refills at any time through one of the following ways: The online [employer] system (except Urgent Care), by calling your provider’s office, or by asking your pharmacy to contact your provider’s office with a refill request. Medication refills are processed only during regular business hours and may not be available until the next business day. Your provider may request additional information or to have a follow-up visit with you prior to  refilling your medication.   *Please Note: Medication refills are assigned a new Rx number when refilled electronically. Your pharmacy may indicate that no refills were authorized even though a new prescription for the same medication is available at the pharmacy. Please request the medicine by name with the pharmacy before contacting your provider for a refill.           MyChart Access Code: Activation code not generated  Current MyChart Status: Active          Quit Tobacco Information     Do you want to quit using tobacco?    Quitting tobacco decreases risks of cancer, heart and lung disease, increases life expectancy, improves sense of taste and smell, and increases spending money, among other benefits.    If you are thinking about quitting, we can help.  • [facility] Quit Tobacco Program: 018-176-3027  o Program occurs weekly for four weeks and includes pharmacist consultation on products to support quitting smoking or chewing tobacco. A provider referral is needed for pharmacist consultation.  • Tobacco Users Help Hotline: 7-230-QUIT-NOW (386-9707) or https://[place].quitlogix.org/  o Free, confidential telephone and online coaching for [place] residents. Sessions are designed on a schedule that is convenient for you. Eligible clients receive free nicotine replacement therapy.  • Nationally: www.smokefree.gov  o Information and professional assistance to support both immediate and long-term needs as you become, and remain, a non-smoker. Smokefree.gov allows you to choose the help that best fits your needs.

## 2017-09-15 ENCOUNTER — OFFICE VISIT (OUTPATIENT)
Dept: BEHAVIORAL HEALTH | Facility: PHYSICIAN GROUP | Age: 37
End: 2017-09-15
Payer: COMMERCIAL

## 2017-09-15 DIAGNOSIS — F41.1 GENERALIZED ANXIETY DISORDER: ICD-10-CM

## 2017-09-15 PROCEDURE — 90834 PSYTX W PT 45 MINUTES: CPT | Performed by: SOCIAL WORKER

## 2017-09-15 PROCEDURE — 99213 OFFICE O/P EST LOW 20 MIN: CPT | Performed by: STUDENT IN AN ORGANIZED HEALTH CARE EDUCATION/TRAINING PROGRAM

## 2017-09-15 RX ORDER — PROPRANOLOL HYDROCHLORIDE 10 MG/1
10 TABLET ORAL 2 TIMES DAILY
Qty: 60 TAB | Refills: 1 | Status: SHIPPED | OUTPATIENT
Start: 2017-09-15 | End: 2017-11-03

## 2017-09-15 RX ORDER — ALPRAZOLAM 0.25 MG/1
0.25 TABLET ORAL
Qty: 15 TAB | Refills: 0 | Status: SHIPPED | OUTPATIENT
Start: 2017-09-15 | End: 2017-10-10 | Stop reason: SDUPTHER
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
## 2017-09-15 NOTE — BH THERAPY
" [facility]  Therapy Progress Note    Patient Name: Pebbles Nelsoner  Patient MRN: [id number]  Today's Date: 9/15/2017     Type of session:Individual psychotherapy  Length of session: 45 minutes  Persons in attendance:Patient    Subjective/New Info: patient reports feeling anxious during therapy session. She denies having PTSD symptoms of past traumatic  incident. Pebbles shared her trigger and symptoms of anxiety. She was closed throughout our session and appeared to be anxious. She was educated on new grounding skills and was asked to practice them to self soothe. Patient will be expected to practice one new skill per week, practice self care and take medications as prescribed.     Objective/Observations:   Participation: Active verbal participation   Grooming: Casual and Neat   Cognition: Alert   Eye contact: Good   Mood: Anxious   Affect: Anxious   Thought process: Goal-directed   Speech: Soft   Other:     Diagnoses:   1. Generalized anxiety disorder         Current risk:   SUICIDE: Not applicable   Homicide: Not applicable   Self-harm: Not applicable   Relapse: Not applicable   Other:    Safety Plan reviewed? Not Indicated   If evidence of imminent risk is present, intervention/plan:     Therapeutic Intervention(s): Relaxation exercise    Treatment Goal(s)/Objective(s) addressed: goal setting      Progress toward Treatment Goals: Moderate improvement    Plan:  - \"Homework\" recommendation: use groundign skills     Nyla Hou L.C.S.W.  9/15/2017                                 "

## 2017-09-25 ENCOUNTER — HOSPITAL ENCOUNTER (OUTPATIENT)
Dept: LAB | Facility: MEDICAL CENTER | Age: 37
End: 2017-09-25
Attending: OBSTETRICS & GYNECOLOGY
Payer: COMMERCIAL

## 2017-09-25 LAB
HBV SURFACE AG SER QL: NEGATIVE
HCV AB SER QL: NEGATIVE
HIV 1+2 AB+HIV1 P24 AG SERPL QL IA: NON REACTIVE
TREPONEMA PALLIDUM IGG+IGM AB [PRESENCE] IN SERUM OR PLASMA BY IMMUNOASSAY: NON REACTIVE

## 2017-09-25 PROCEDURE — 87340 HEPATITIS B SURFACE AG IA: CPT

## 2017-09-25 PROCEDURE — 86803 HEPATITIS C AB TEST: CPT

## 2017-09-25 PROCEDURE — 36415 COLL VENOUS BLD VENIPUNCTURE: CPT

## 2017-09-25 PROCEDURE — 87389 HIV-1 AG W/HIV-1&-2 AB AG IA: CPT

## 2017-09-25 PROCEDURE — 86780 TREPONEMA PALLIDUM: CPT

## 2017-10-10 ENCOUNTER — TELEPHONE (OUTPATIENT)
Dept: BEHAVIORAL HEALTH | Facility: PHYSICIAN GROUP | Age: 37
End: 2017-10-10

## 2017-10-10 RX ORDER — ALPRAZOLAM 0.25 MG/1
0.25 TABLET ORAL
Qty: 10 TAB | Refills: 0 | OUTPATIENT
Start: 2017-10-10 | End: 2017-11-03 | Stop reason: SDUPTHER

## 2017-10-10 RX ORDER — DIAZEPAM 5 MG/1
5 TABLET ORAL 2 TIMES DAILY
Qty: 60 TAB | Refills: 1 | OUTPATIENT
Start: 2017-10-10 | End: 2017-11-03 | Stop reason: SDUPTHER

## 2017-10-10 NOTE — TELEPHONE ENCOUNTER
Spoke with pt over the phone, says she was recently diagnosed with aggressive stage 2 breast cancer last week, having chemo-port surgery tomorrow. Requesting medication for severe anxiety related to this stressor. Agreed for short course of Valium 5mg PO BID X 30 days. Also for breakthru panic attack 0.25mg PO QDaily PRN 10 tabs 0 refill. Requested that she wait to start taking valium until she speaks with oncology.

## 2017-10-10 NOTE — TELEPHONE ENCOUNTER
----- Message from Sharron Duenas sent at 10/9/2017  3:50 PM PDT -----  Regarding: Phone Message  Contact: [phone]  Pt called and needs to talk to you regarding RX refills  Pt was just diagnosed with Breast Cancer, anxiety is extreme.          Patient is unable to be reached via telephone.  Left message on patient VM . Will try to call again.     Alvaro Vasquez MD  PGY 3 Psychiatry [facility]

## 2017-10-18 ENCOUNTER — HOSPITAL ENCOUNTER (OUTPATIENT)
Dept: LAB | Facility: MEDICAL CENTER | Age: 37
End: 2017-10-18
Attending: INTERNAL MEDICINE
Payer: COMMERCIAL

## 2017-10-18 LAB
ALBUMIN SERPL BCP-MCNC: 4.2 G/DL (ref 3.2–4.9)
ALBUMIN/GLOB SERPL: 1.4 G/DL
ALP SERPL-CCNC: 63 U/L (ref 30–99)
ALT SERPL-CCNC: 11 U/L (ref 2–50)
ANION GAP SERPL CALC-SCNC: 12 MMOL/L (ref 0–11.9)
AST SERPL-CCNC: 13 U/L (ref 12–45)
BILIRUB SERPL-MCNC: 0.2 MG/DL (ref 0.1–1.5)
BUN SERPL-MCNC: 11 MG/DL (ref 8–22)
CALCIUM SERPL-MCNC: 9.1 MG/DL (ref 8.5–10.5)
CHLORIDE SERPL-SCNC: 104 MMOL/L (ref 96–112)
CO2 SERPL-SCNC: 22 MMOL/L (ref 20–33)
CREAT SERPL-MCNC: 0.56 MG/DL (ref 0.5–1.4)
GFR SERPL CREATININE-BSD FRML MDRD: >60 ML/MIN/1.73 M 2
GLOBULIN SER CALC-MCNC: 3 G/DL (ref 1.9–3.5)
GLUCOSE SERPL-MCNC: 90 MG/DL (ref 65–99)
POTASSIUM SERPL-SCNC: 4.2 MMOL/L (ref 3.6–5.5)
PROT SERPL-MCNC: 7.2 G/DL (ref 6–8.2)
SODIUM SERPL-SCNC: 138 MMOL/L (ref 135–145)

## 2017-10-18 PROCEDURE — 80053 COMPREHEN METABOLIC PANEL: CPT

## 2017-10-25 ENCOUNTER — HOSPITAL ENCOUNTER (OUTPATIENT)
Dept: LAB | Facility: MEDICAL CENTER | Age: 37
End: 2017-10-25
Attending: INTERNAL MEDICINE
Payer: COMMERCIAL

## 2017-10-25 PROCEDURE — 86300 IMMUNOASSAY TUMOR CA 15-3: CPT

## 2017-10-27 LAB — CANCER AG27-29 SERPL-ACNC: 27.2 U/ML (ref 0–40)

## 2017-11-03 ENCOUNTER — OFFICE VISIT (OUTPATIENT)
Dept: BEHAVIORAL HEALTH | Facility: PHYSICIAN GROUP | Age: 37
End: 2017-11-03
Payer: COMMERCIAL

## 2017-11-03 DIAGNOSIS — F41.1 GENERALIZED ANXIETY DISORDER: ICD-10-CM

## 2017-11-03 PROCEDURE — 99214 OFFICE O/P EST MOD 30 MIN: CPT | Performed by: STUDENT IN AN ORGANIZED HEALTH CARE EDUCATION/TRAINING PROGRAM

## 2017-11-03 RX ORDER — ALPRAZOLAM 0.25 MG/1
0.25 TABLET ORAL
Qty: 10 TAB | Refills: 1
Start: 2017-11-03 | End: 2018-02-05 | Stop reason: SDUPTHER

## 2017-11-03 RX ORDER — DIAZEPAM 5 MG/1
TABLET ORAL
Qty: 45 TAB | Refills: 1
Start: 2017-11-03 | End: 2017-12-22 | Stop reason: SDUPTHER
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
## 2017-11-20 ENCOUNTER — HOSPITAL ENCOUNTER (OUTPATIENT)
Dept: LAB | Facility: MEDICAL CENTER | Age: 37
End: 2017-11-20
Attending: INTERNAL MEDICINE
Payer: COMMERCIAL

## 2017-11-20 LAB
AMBIGUOUS DTTM AMBI4: NORMAL
T4 FREE SERPL-MCNC: 0.74 NG/DL (ref 0.53–1.43)
TSH SERPL DL<=0.005 MIU/L-ACNC: 3.92 UIU/ML (ref 0.3–3.7)

## 2017-11-20 PROCEDURE — 84439 ASSAY OF FREE THYROXINE: CPT

## 2017-11-20 PROCEDURE — 84443 ASSAY THYROID STIM HORMONE: CPT

## 2017-12-04 ENCOUNTER — HOSPITAL ENCOUNTER (OUTPATIENT)
Dept: LAB | Facility: MEDICAL CENTER | Age: 37
End: 2017-12-04
Attending: INTERNAL MEDICINE
Payer: COMMERCIAL

## 2017-12-04 LAB
ALBUMIN SERPL BCP-MCNC: 4 G/DL (ref 3.2–4.9)
ALBUMIN/GLOB SERPL: 1.4 G/DL
ALP SERPL-CCNC: 81 U/L (ref 30–99)
ALT SERPL-CCNC: 13 U/L (ref 2–50)
ANION GAP SERPL CALC-SCNC: 9 MMOL/L (ref 0–11.9)
AST SERPL-CCNC: 17 U/L (ref 12–45)
BILIRUB SERPL-MCNC: 0.2 MG/DL (ref 0.1–1.5)
BUN SERPL-MCNC: 10 MG/DL (ref 8–22)
CALCIUM SERPL-MCNC: 9.6 MG/DL (ref 8.5–10.5)
CHLORIDE SERPL-SCNC: 102 MMOL/L (ref 96–112)
CO2 SERPL-SCNC: 27 MMOL/L (ref 20–33)
CREAT SERPL-MCNC: 0.54 MG/DL (ref 0.5–1.4)
GFR SERPL CREATININE-BSD FRML MDRD: >60 ML/MIN/1.73 M 2
GLOBULIN SER CALC-MCNC: 2.8 G/DL (ref 1.9–3.5)
GLUCOSE SERPL-MCNC: 93 MG/DL (ref 65–99)
POTASSIUM SERPL-SCNC: 4.4 MMOL/L (ref 3.6–5.5)
PROT SERPL-MCNC: 6.8 G/DL (ref 6–8.2)
SODIUM SERPL-SCNC: 138 MMOL/L (ref 135–145)

## 2017-12-04 PROCEDURE — 80053 COMPREHEN METABOLIC PANEL: CPT

## 2017-12-19 ENCOUNTER — APPOINTMENT (OUTPATIENT)
Dept: BEHAVIORAL HEALTH | Facility: PHYSICIAN GROUP | Age: 37
End: 2017-12-19
Payer: COMMERCIAL

## 2017-12-19 ENCOUNTER — HOSPITAL ENCOUNTER (OUTPATIENT)
Facility: MEDICAL CENTER | Age: 37
End: 2017-12-19
Attending: INTERNAL MEDICINE
Payer: COMMERCIAL

## 2017-12-19 LAB — AMBIGUOUS DTTM AMBI4: NORMAL

## 2017-12-19 PROCEDURE — 80053 COMPREHEN METABOLIC PANEL: CPT

## 2017-12-20 LAB
ALBUMIN SERPL BCP-MCNC: 4 G/DL (ref 3.2–4.9)
ALBUMIN/GLOB SERPL: 1.6 G/DL
ALP SERPL-CCNC: 79 U/L (ref 30–99)
ALT SERPL-CCNC: 18 U/L (ref 2–50)
ANION GAP SERPL CALC-SCNC: 11 MMOL/L (ref 0–11.9)
AST SERPL-CCNC: 18 U/L (ref 12–45)
BILIRUB SERPL-MCNC: 0.2 MG/DL (ref 0.1–1.5)
BUN SERPL-MCNC: 14 MG/DL (ref 8–22)
CALCIUM SERPL-MCNC: 9.1 MG/DL (ref 8.5–10.5)
CHLORIDE SERPL-SCNC: 106 MMOL/L (ref 96–112)
CO2 SERPL-SCNC: 23 MMOL/L (ref 20–33)
CREAT SERPL-MCNC: 0.62 MG/DL (ref 0.5–1.4)
GFR SERPL CREATININE-BSD FRML MDRD: >60 ML/MIN/1.73 M 2
GLOBULIN SER CALC-MCNC: 2.5 G/DL (ref 1.9–3.5)
GLUCOSE SERPL-MCNC: 89 MG/DL (ref 65–99)
POTASSIUM SERPL-SCNC: 4.1 MMOL/L (ref 3.6–5.5)
PROT SERPL-MCNC: 6.5 G/DL (ref 6–8.2)
SODIUM SERPL-SCNC: 140 MMOL/L (ref 135–145)

## 2017-12-22 ENCOUNTER — OFFICE VISIT (OUTPATIENT)
Dept: BEHAVIORAL HEALTH | Facility: PHYSICIAN GROUP | Age: 37
End: 2017-12-22
Payer: COMMERCIAL

## 2017-12-22 DIAGNOSIS — F41.1 GENERALIZED ANXIETY DISORDER: ICD-10-CM

## 2017-12-22 PROCEDURE — 99213 OFFICE O/P EST LOW 20 MIN: CPT | Performed by: STUDENT IN AN ORGANIZED HEALTH CARE EDUCATION/TRAINING PROGRAM

## 2017-12-22 RX ORDER — DIAZEPAM 5 MG/1
TABLET ORAL
Qty: 45 TAB | Refills: 2 | Status: SHIPPED | OUTPATIENT
Start: 2017-12-22 | End: 2018-01-19
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
## 2017-12-28 ENCOUNTER — HOSPITAL ENCOUNTER (OUTPATIENT)
Dept: LAB | Facility: MEDICAL CENTER | Age: 37
End: 2017-12-28
Attending: OBSTETRICS & GYNECOLOGY
Payer: COMMERCIAL

## 2017-12-28 ENCOUNTER — HOSPITAL ENCOUNTER (OUTPATIENT)
Dept: RADIOLOGY | Facility: MEDICAL CENTER | Age: 37
End: 2017-12-28
Attending: OBSTETRICS & GYNECOLOGY
Payer: COMMERCIAL

## 2017-12-28 DIAGNOSIS — Z80.41 FH: OVARIAN CANCER: ICD-10-CM

## 2017-12-28 LAB — CANCER AG125 SERPL-ACNC: 19.4 U/ML (ref 0–35)

## 2017-12-28 PROCEDURE — 36415 COLL VENOUS BLD VENIPUNCTURE: CPT

## 2017-12-28 PROCEDURE — 86304 IMMUNOASSAY TUMOR CA 125: CPT

## 2017-12-28 PROCEDURE — 76830 TRANSVAGINAL US NON-OB: CPT

## 2018-01-03 ENCOUNTER — HOSPITAL ENCOUNTER (OUTPATIENT)
Facility: MEDICAL CENTER | Age: 38
End: 2018-01-03
Attending: NURSE PRACTITIONER
Payer: COMMERCIAL

## 2018-01-03 LAB
ANION GAP SERPL CALC-SCNC: 6 MMOL/L (ref 0–11.9)
BUN SERPL-MCNC: 17 MG/DL (ref 8–22)
CALCIUM SERPL-MCNC: 8.7 MG/DL (ref 8.5–10.5)
CHLORIDE SERPL-SCNC: 104 MMOL/L (ref 96–112)
CO2 SERPL-SCNC: 26 MMOL/L (ref 20–33)
CREAT SERPL-MCNC: 0.56 MG/DL (ref 0.5–1.4)
GFR SERPL CREATININE-BSD FRML MDRD: >60 ML/MIN/1.73 M 2
GLUCOSE SERPL-MCNC: 88 MG/DL (ref 65–99)
POTASSIUM SERPL-SCNC: 4.4 MMOL/L (ref 3.6–5.5)
SODIUM SERPL-SCNC: 136 MMOL/L (ref 135–145)

## 2018-01-03 PROCEDURE — 80048 BASIC METABOLIC PNL TOTAL CA: CPT

## 2018-01-15 ENCOUNTER — HOSPITAL ENCOUNTER (OUTPATIENT)
Dept: LAB | Facility: MEDICAL CENTER | Age: 38
End: 2018-01-15
Attending: NURSE PRACTITIONER
Payer: COMMERCIAL

## 2018-01-15 LAB
ALBUMIN SERPL BCP-MCNC: 4 G/DL (ref 3.2–4.9)
ALBUMIN/GLOB SERPL: 1.7 G/DL
ALP SERPL-CCNC: 64 U/L (ref 30–99)
ALT SERPL-CCNC: 219 U/L (ref 2–50)
ANION GAP SERPL CALC-SCNC: 5 MMOL/L (ref 0–11.9)
AST SERPL-CCNC: 95 U/L (ref 12–45)
BILIRUB SERPL-MCNC: 0.3 MG/DL (ref 0.1–1.5)
BUN SERPL-MCNC: 15 MG/DL (ref 8–22)
CALCIUM SERPL-MCNC: 9.1 MG/DL (ref 8.5–10.5)
CHLORIDE SERPL-SCNC: 105 MMOL/L (ref 96–112)
CO2 SERPL-SCNC: 27 MMOL/L (ref 20–33)
CREAT SERPL-MCNC: 0.56 MG/DL (ref 0.5–1.4)
GLOBULIN SER CALC-MCNC: 2.4 G/DL (ref 1.9–3.5)
GLUCOSE SERPL-MCNC: 87 MG/DL (ref 65–99)
POTASSIUM SERPL-SCNC: 4.4 MMOL/L (ref 3.6–5.5)
PROT SERPL-MCNC: 6.4 G/DL (ref 6–8.2)
SODIUM SERPL-SCNC: 137 MMOL/L (ref 135–145)

## 2018-01-15 PROCEDURE — 80053 COMPREHEN METABOLIC PANEL: CPT

## 2018-01-22 ENCOUNTER — HOSPITAL ENCOUNTER (OUTPATIENT)
Facility: MEDICAL CENTER | Age: 38
End: 2018-01-22
Attending: NURSE PRACTITIONER
Payer: COMMERCIAL

## 2018-01-22 LAB
ALBUMIN SERPL BCP-MCNC: 3.5 G/DL (ref 3.2–4.9)
ALBUMIN/GLOB SERPL: 1.3 G/DL
ALP SERPL-CCNC: 76 U/L (ref 30–99)
ALT SERPL-CCNC: 101 U/L (ref 2–50)
ANION GAP SERPL CALC-SCNC: 9 MMOL/L (ref 0–11.9)
AST SERPL-CCNC: 37 U/L (ref 12–45)
BILIRUB SERPL-MCNC: 0.2 MG/DL (ref 0.1–1.5)
BUN SERPL-MCNC: 14 MG/DL (ref 8–22)
CALCIUM SERPL-MCNC: 9 MG/DL (ref 8.5–10.5)
CHLORIDE SERPL-SCNC: 109 MMOL/L (ref 96–112)
CO2 SERPL-SCNC: 22 MMOL/L (ref 20–33)
CREAT SERPL-MCNC: 0.51 MG/DL (ref 0.5–1.4)
GLOBULIN SER CALC-MCNC: 2.6 G/DL (ref 1.9–3.5)
GLUCOSE SERPL-MCNC: 97 MG/DL (ref 65–99)
HAV IGM SERPL QL IA: NEGATIVE
HBV CORE IGM SER QL: NEGATIVE
HBV SURFACE AG SER QL: NEGATIVE
HCV AB SER QL: NEGATIVE
POTASSIUM SERPL-SCNC: 4.2 MMOL/L (ref 3.6–5.5)
PROT SERPL-MCNC: 6.1 G/DL (ref 6–8.2)
SODIUM SERPL-SCNC: 140 MMOL/L (ref 135–145)

## 2018-01-22 PROCEDURE — 80053 COMPREHEN METABOLIC PANEL: CPT

## 2018-01-22 PROCEDURE — 80074 ACUTE HEPATITIS PANEL: CPT

## 2018-01-29 ENCOUNTER — HOSPITAL ENCOUNTER (OUTPATIENT)
Facility: MEDICAL CENTER | Age: 38
End: 2018-01-29
Attending: INTERNAL MEDICINE
Payer: COMMERCIAL

## 2018-01-29 LAB
ALBUMIN SERPL BCP-MCNC: 3.7 G/DL (ref 3.2–4.9)
ALBUMIN/GLOB SERPL: 1.5 G/DL
ALP SERPL-CCNC: 69 U/L (ref 30–99)
ALT SERPL-CCNC: 90 U/L (ref 2–50)
ANION GAP SERPL CALC-SCNC: 7 MMOL/L (ref 0–11.9)
AST SERPL-CCNC: 43 U/L (ref 12–45)
BILIRUB SERPL-MCNC: 0.3 MG/DL (ref 0.1–1.5)
BUN SERPL-MCNC: 15 MG/DL (ref 8–22)
CALCIUM SERPL-MCNC: 9.4 MG/DL (ref 8.5–10.5)
CHLORIDE SERPL-SCNC: 105 MMOL/L (ref 96–112)
CO2 SERPL-SCNC: 27 MMOL/L (ref 20–33)
CREAT SERPL-MCNC: 0.46 MG/DL (ref 0.5–1.4)
GLOBULIN SER CALC-MCNC: 2.4 G/DL (ref 1.9–3.5)
GLUCOSE SERPL-MCNC: 89 MG/DL (ref 65–99)
POTASSIUM SERPL-SCNC: 4.3 MMOL/L (ref 3.6–5.5)
PROT SERPL-MCNC: 6.1 G/DL (ref 6–8.2)
SODIUM SERPL-SCNC: 139 MMOL/L (ref 135–145)

## 2018-01-29 PROCEDURE — 80053 COMPREHEN METABOLIC PANEL: CPT

## 2018-02-05 ENCOUNTER — HOSPITAL ENCOUNTER (OUTPATIENT)
Facility: MEDICAL CENTER | Age: 38
End: 2018-02-05
Attending: INTERNAL MEDICINE
Payer: COMMERCIAL

## 2018-02-05 DIAGNOSIS — F41.1 GAD (GENERALIZED ANXIETY DISORDER): Primary | ICD-10-CM

## 2018-02-05 LAB
ALBUMIN SERPL BCP-MCNC: 3.8 G/DL (ref 3.2–4.9)
ALBUMIN/GLOB SERPL: 1.8 G/DL
ALP SERPL-CCNC: 64 U/L (ref 30–99)
ALT SERPL-CCNC: 59 U/L (ref 2–50)
ANION GAP SERPL CALC-SCNC: 9 MMOL/L (ref 0–11.9)
AST SERPL-CCNC: 33 U/L (ref 12–45)
BILIRUB SERPL-MCNC: 0.2 MG/DL (ref 0.1–1.5)
BUN SERPL-MCNC: 13 MG/DL (ref 8–22)
CALCIUM SERPL-MCNC: 8.7 MG/DL (ref 8.5–10.5)
CHLORIDE SERPL-SCNC: 105 MMOL/L (ref 96–112)
CO2 SERPL-SCNC: 24 MMOL/L (ref 20–33)
CREAT SERPL-MCNC: 0.55 MG/DL (ref 0.5–1.4)
GLOBULIN SER CALC-MCNC: 2.1 G/DL (ref 1.9–3.5)
GLUCOSE SERPL-MCNC: 100 MG/DL (ref 65–99)
POTASSIUM SERPL-SCNC: 4.4 MMOL/L (ref 3.6–5.5)
PROT SERPL-MCNC: 5.9 G/DL (ref 6–8.2)
SODIUM SERPL-SCNC: 138 MMOL/L (ref 135–145)

## 2018-02-05 PROCEDURE — 80053 COMPREHEN METABOLIC PANEL: CPT

## 2018-02-05 RX ORDER — ALPRAZOLAM 0.25 MG/1
0.25 TABLET ORAL
Qty: 10 TAB | Refills: 1 | OUTPATIENT
Start: 2018-02-05 | End: 2018-03-12 | Stop reason: SDUPTHER

## 2018-02-05 NOTE — TELEPHONE ENCOUNTER
Spoke with patient over the phone. Says that her naturopathic doctor is helping her with medication that has SSRi properties for her anxiety- says that she feels more relaxed and calm overall and has reduced her valium use from 7.5mg daily to 2.5mg BID for the last 1 month. Patient wants to further decrease her benzodiazepine use, agreed to take 2.5mg in the morning and half of 2.5mg in the evening until her next appointment. Requesting for PRN xanax at this time which I have agreed to 0.25mg PRN - 10 tabs with 1 refill called into [facility] [address],[place].

## 2018-02-12 ENCOUNTER — HOSPITAL ENCOUNTER (OUTPATIENT)
Facility: MEDICAL CENTER | Age: 38
End: 2018-02-12
Attending: INTERNAL MEDICINE
Payer: COMMERCIAL

## 2018-02-12 LAB
ALBUMIN SERPL BCP-MCNC: 3.6 G/DL (ref 3.2–4.9)
ALBUMIN/GLOB SERPL: 1.3 G/DL
ALP SERPL-CCNC: 64 U/L (ref 30–99)
ALT SERPL-CCNC: 60 U/L (ref 2–50)
AMBIGUOUS DTTM AMBI4: NORMAL
ANION GAP SERPL CALC-SCNC: 6 MMOL/L (ref 0–11.9)
AST SERPL-CCNC: 34 U/L (ref 12–45)
BASOPHILS # BLD AUTO: 0.6 % (ref 0–1.8)
BASOPHILS # BLD: 0.03 K/UL (ref 0–0.12)
BILIRUB SERPL-MCNC: 0.2 MG/DL (ref 0.1–1.5)
BUN SERPL-MCNC: 18 MG/DL (ref 8–22)
CALCIUM SERPL-MCNC: 9.2 MG/DL (ref 8.5–10.5)
CHLORIDE SERPL-SCNC: 104 MMOL/L (ref 96–112)
CO2 SERPL-SCNC: 28 MMOL/L (ref 20–33)
CREAT SERPL-MCNC: 0.59 MG/DL (ref 0.5–1.4)
EOSINOPHIL # BLD AUTO: 0.02 K/UL (ref 0–0.51)
EOSINOPHIL NFR BLD: 0.4 % (ref 0–6.9)
ERYTHROCYTE [DISTWIDTH] IN BLOOD BY AUTOMATED COUNT: 58.8 FL (ref 35.9–50)
GLOBULIN SER CALC-MCNC: 2.7 G/DL (ref 1.9–3.5)
GLUCOSE SERPL-MCNC: 83 MG/DL (ref 65–99)
HCT VFR BLD AUTO: 29.6 % (ref 37–47)
HGB BLD-MCNC: 9.4 G/DL (ref 12–16)
IMM GRANULOCYTES # BLD AUTO: 0.23 K/UL (ref 0–0.11)
IMM GRANULOCYTES NFR BLD AUTO: 4.8 % (ref 0–0.9)
LYMPHOCYTES # BLD AUTO: 1.22 K/UL (ref 1–4.8)
LYMPHOCYTES NFR BLD: 25.2 % (ref 22–41)
MCH RBC QN AUTO: 34.3 PG (ref 27–33)
MCHC RBC AUTO-ENTMCNC: 31.8 G/DL (ref 33.6–35)
MCV RBC AUTO: 108 FL (ref 81.4–97.8)
MONOCYTES # BLD AUTO: 0.7 K/UL (ref 0–0.85)
MONOCYTES NFR BLD AUTO: 14.5 % (ref 0–13.4)
NEUTROPHILS # BLD AUTO: 2.64 K/UL (ref 2–7.15)
NEUTROPHILS NFR BLD: 54.5 % (ref 44–72)
NRBC # BLD AUTO: 0.03 K/UL
NRBC BLD-RTO: 0.6 /100 WBC
PLATELET # BLD AUTO: 294 K/UL (ref 164–446)
PMV BLD AUTO: 9.2 FL (ref 9–12.9)
POTASSIUM SERPL-SCNC: 4.5 MMOL/L (ref 3.6–5.5)
PROT SERPL-MCNC: 6.3 G/DL (ref 6–8.2)
RBC # BLD AUTO: 2.74 M/UL (ref 4.2–5.4)
SODIUM SERPL-SCNC: 138 MMOL/L (ref 135–145)
WBC # BLD AUTO: 4.8 K/UL (ref 4.8–10.8)

## 2018-02-12 PROCEDURE — 85025 COMPLETE CBC W/AUTO DIFF WBC: CPT

## 2018-02-12 PROCEDURE — 80053 COMPREHEN METABOLIC PANEL: CPT

## 2018-02-14 ENCOUNTER — APPOINTMENT (OUTPATIENT)
Dept: OTHER | Facility: IMAGING CENTER | Age: 38
End: 2018-02-14

## 2018-02-15 ENCOUNTER — APPOINTMENT (OUTPATIENT)
Dept: OTHER | Facility: IMAGING CENTER | Age: 38
End: 2018-02-15

## 2018-02-26 ENCOUNTER — HOSPITAL ENCOUNTER (OUTPATIENT)
Dept: LAB | Facility: MEDICAL CENTER | Age: 38
End: 2018-02-26
Attending: INTERNAL MEDICINE
Payer: COMMERCIAL

## 2018-02-26 LAB
25(OH)D3 SERPL-MCNC: 28 NG/ML (ref 30–100)
ALBUMIN SERPL BCP-MCNC: 3.9 G/DL (ref 3.2–4.9)
ALBUMIN/GLOB SERPL: 1.7 G/DL
ALP SERPL-CCNC: 69 U/L (ref 30–99)
ALT SERPL-CCNC: 48 U/L (ref 2–50)
ANION GAP SERPL CALC-SCNC: 8 MMOL/L (ref 0–11.9)
AST SERPL-CCNC: 31 U/L (ref 12–45)
BILIRUB SERPL-MCNC: 0.2 MG/DL (ref 0.1–1.5)
BUN SERPL-MCNC: 16 MG/DL (ref 8–22)
CALCIUM SERPL-MCNC: 8.6 MG/DL (ref 8.5–10.5)
CHLORIDE SERPL-SCNC: 105 MMOL/L (ref 96–112)
CO2 SERPL-SCNC: 26 MMOL/L (ref 20–33)
CREAT SERPL-MCNC: 0.5 MG/DL (ref 0.5–1.4)
GLOBULIN SER CALC-MCNC: 2.3 G/DL (ref 1.9–3.5)
GLUCOSE SERPL-MCNC: 99 MG/DL (ref 65–99)
POTASSIUM SERPL-SCNC: 4.3 MMOL/L (ref 3.6–5.5)
PROT SERPL-MCNC: 6.2 G/DL (ref 6–8.2)
SODIUM SERPL-SCNC: 139 MMOL/L (ref 135–145)

## 2018-02-26 PROCEDURE — 80053 COMPREHEN METABOLIC PANEL: CPT

## 2018-02-26 PROCEDURE — 82306 VITAMIN D 25 HYDROXY: CPT

## 2018-03-05 ENCOUNTER — HOSPITAL ENCOUNTER (OUTPATIENT)
Dept: LAB | Facility: MEDICAL CENTER | Age: 38
End: 2018-03-05
Attending: INTERNAL MEDICINE
Payer: COMMERCIAL

## 2018-03-05 LAB
ALBUMIN SERPL BCP-MCNC: 3.8 G/DL (ref 3.2–4.9)
ALBUMIN/GLOB SERPL: 1.7 G/DL
ALP SERPL-CCNC: 71 U/L (ref 30–99)
ALT SERPL-CCNC: 44 U/L (ref 2–50)
ANION GAP SERPL CALC-SCNC: 9 MMOL/L (ref 0–11.9)
AST SERPL-CCNC: 27 U/L (ref 12–45)
BILIRUB SERPL-MCNC: 0.2 MG/DL (ref 0.1–1.5)
BUN SERPL-MCNC: 17 MG/DL (ref 8–22)
CALCIUM SERPL-MCNC: 8.7 MG/DL (ref 8.5–10.5)
CHLORIDE SERPL-SCNC: 107 MMOL/L (ref 96–112)
CO2 SERPL-SCNC: 25 MMOL/L (ref 20–33)
CREAT SERPL-MCNC: 0.5 MG/DL (ref 0.5–1.4)
GLOBULIN SER CALC-MCNC: 2.2 G/DL (ref 1.9–3.5)
GLUCOSE SERPL-MCNC: 95 MG/DL (ref 65–99)
POTASSIUM SERPL-SCNC: 4.1 MMOL/L (ref 3.6–5.5)
PROT SERPL-MCNC: 6 G/DL (ref 6–8.2)
SODIUM SERPL-SCNC: 141 MMOL/L (ref 135–145)

## 2018-03-05 PROCEDURE — 80053 COMPREHEN METABOLIC PANEL: CPT

## 2018-03-09 ENCOUNTER — APPOINTMENT (OUTPATIENT)
Dept: OTHER | Facility: IMAGING CENTER | Age: 38
End: 2018-03-09

## 2018-03-12 DIAGNOSIS — F41.1 GAD (GENERALIZED ANXIETY DISORDER): ICD-10-CM

## 2018-03-12 RX ORDER — ALPRAZOLAM 0.25 MG/1
0.25 TABLET ORAL
Qty: 10 TAB | Refills: 1 | Status: SHIPPED | OUTPATIENT
Start: 2018-03-12 | End: 2018-03-23 | Stop reason: SDUPTHER

## 2018-03-14 ENCOUNTER — APPOINTMENT (OUTPATIENT)
Dept: OTHER | Facility: IMAGING CENTER | Age: 38
End: 2018-03-14

## 2018-03-23 ENCOUNTER — OFFICE VISIT (OUTPATIENT)
Dept: BEHAVIORAL HEALTH | Facility: PHYSICIAN GROUP | Age: 38
End: 2018-03-23
Payer: COMMERCIAL

## 2018-03-23 DIAGNOSIS — F41.0 PANIC ATTACK: Primary | ICD-10-CM

## 2018-03-23 DIAGNOSIS — F41.1 GAD (GENERALIZED ANXIETY DISORDER): ICD-10-CM

## 2018-03-23 DIAGNOSIS — F41.1 GENERALIZED ANXIETY DISORDER: ICD-10-CM

## 2018-03-23 DIAGNOSIS — F41.8 SITUATIONAL ANXIETY: ICD-10-CM

## 2018-03-23 PROCEDURE — 99214 OFFICE O/P EST MOD 30 MIN: CPT | Performed by: STUDENT IN AN ORGANIZED HEALTH CARE EDUCATION/TRAINING PROGRAM

## 2018-03-23 RX ORDER — ALPRAZOLAM 0.25 MG/1
0.25 TABLET ORAL
Qty: 30 TAB | Refills: 2 | Status: SHIPPED | OUTPATIENT
Start: 2018-03-23 | End: 2018-04-22
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
## 2018-04-20 ENCOUNTER — OFFICE VISIT (OUTPATIENT)
Dept: URGENT CARE | Facility: PHYSICIAN GROUP | Age: 38
End: 2018-04-20
Payer: COMMERCIAL

## 2018-04-20 VITALS
WEIGHT: 210 LBS | TEMPERATURE: 98.1 F | HEART RATE: 78 BPM | BODY MASS INDEX: 35.85 KG/M2 | HEIGHT: 64 IN | OXYGEN SATURATION: 97 % | RESPIRATION RATE: 15 BRPM

## 2018-04-20 DIAGNOSIS — G89.18 POST-OPERATIVE PAIN: ICD-10-CM

## 2018-04-20 DIAGNOSIS — Z76.0 MEDICATION REFILL: ICD-10-CM

## 2018-04-20 PROCEDURE — 99214 OFFICE O/P EST MOD 30 MIN: CPT | Performed by: PHYSICIAN ASSISTANT

## 2018-04-20 RX ORDER — SULFAMETHOXAZOLE AND TRIMETHOPRIM 800; 160 MG/1; MG/1
1 TABLET ORAL 2 TIMES DAILY
Status: ON HOLD | COMMUNITY
End: 2018-06-20

## 2018-04-20 RX ORDER — HYDROCODONE BITARTRATE AND ACETAMINOPHEN 7.5; 325 MG/1; MG/1
1 TABLET ORAL EVERY 4 HOURS PRN
Qty: 30 TAB | Refills: 0 | Status: SHIPPED | OUTPATIENT
Start: 2018-04-20 | End: 2018-05-11

## 2018-04-20 RX ORDER — METHOCARBAMOL 750 MG/1
750 TABLET, FILM COATED ORAL 4 TIMES DAILY
COMMUNITY
End: 2018-06-15

## 2018-04-20 RX ORDER — HYDROCODONE BITARTRATE AND ACETAMINOPHEN 5; 325 MG/1; MG/1
1-2 TABLET ORAL EVERY 4 HOURS PRN
COMMUNITY
End: 2018-06-15

## 2018-04-20 RX ORDER — CEFADROXIL 500 MG/1
500 CAPSULE ORAL 2 TIMES DAILY
COMMUNITY
End: 2018-06-15

## 2018-04-20 ASSESSMENT — PAIN SCALES - GENERAL: PAINLEVEL: 6=MODERATE PAIN

## 2018-04-20 ASSESSMENT — ENCOUNTER SYMPTOMS
VOMITING: 0
SHORTNESS OF BREATH: 0
PAIN: 1
NAUSEA: 0
CHILLS: 0
FEVER: 0

## 2018-04-21 NOTE — PROGRESS NOTES
"Subjective:   Pebbles Cosme is a 37 y.o. female who presents for Pain (Surgical site post bilateral masectomy on 04/12/2018)        Pain   Pertinent negatives include no chills, fever, myalgias, nausea, rash or vomiting.   Notes had surgery 8d prior, had mastectomy bilat - is concerned w/ running out of pain meds, has been    Had been taking norco for pain 10mg tabs 2 every 6hrs, taking 1.5 q 6hrs - spoke w/ surgical specialists and concerned w/ degree of acetaminophen - taking muscle relaxants, also taking robaxin.  She is somewhat frustrated that her surgeons did not better prepare her for the need for pain meds. She has been taking pain meds at a fast rate w/ no plan to fill prior to travel out of state for first post op appt (travels Monday for appt Tuesday of this up coming week).     I do discuss the nature of UC and how we can only fill short term Rx's for pain meds. I did discuss w/ her my concerns w/ tylenol use and possible working on weaning down w/ use of OTC nsaids, etc. Pt does become tearful and is upset. Mentions she feels like I am treating her like a drug seeker, while I describe that this will represent the most narcotics I have written in 2018 and I need to inquire and advise appropriately. Pt does not respond for last few minutes of visit.     Review of Systems   Constitutional: Negative for chills and fever.   Respiratory: Negative for shortness of breath.    Gastrointestinal: Negative for nausea and vomiting.   Musculoskeletal: Negative for myalgias.   Skin: Negative for rash.        C/o surgical site pain s/p mastectomy     No Known Allergies   I have worn a mask for the entire encounter with this patient.    Objective:   Pulse 78   Temp 36.7 °C (98.1 °F)   Resp 15   Ht 1.626 m (5' 4\")   Wt 95.3 kg (210 lb)   SpO2 97%   BMI 36.05 kg/m²   Physical Exam   Constitutional: She is oriented to person, place, and time. She appears well-developed and well-nourished. No distress.   HENT: "   Head: Normocephalic and atraumatic.   Right Ear: External ear normal.   Left Ear: External ear normal.   Nose: Nose normal.   Eyes: Conjunctivae and lids are normal. Right eye exhibits no discharge. Left eye exhibits no discharge. No scleral icterus.   Neck: Neck supple.   Pulmonary/Chest: Effort normal. No respiratory distress.   Musculoskeletal: Normal range of motion.   Neurological: She is alert and oriented to person, place, and time. She is not disoriented.   Skin: Skin is warm and dry. She is not diaphoretic. No erythema. No pallor.   Psychiatric: Her speech is normal and behavior is normal.   Nursing note and vitals reviewed.        Assessment/Plan:   Assessment    1. Post-operative pain  I attempt to review narcotic pain med consent form w/ pt and she does not respond, signs, takes the Rx and storms out of the room.    Cautioned regarding potential for sedation with medication.    - HYDROcodone-acetaminophen (NORCO) 7.5-325 MG per tablet; Take 1 Tab by mouth every four hours as needed for up to 21 days.  Dispense: 30 Tab; Refill: 0  - CONSENT FOR OPIATE PRESCRIPTION    2. Medication refill    - HYDROcodone-acetaminophen (NORCO) 7.5-325 MG per tablet; Take 1 Tab by mouth every four hours as needed for up to 21 days.  Dispense: 30 Tab; Refill: 0  - CONSENT FOR OPIATE PRESCRIPTION    Differential diagnosis, natural history, supportive care, and indications for immediate follow-up discussed.

## 2018-04-23 ASSESSMENT — ENCOUNTER SYMPTOMS: MYALGIAS: 0

## 2018-05-01 ENCOUNTER — HOSPITAL ENCOUNTER (OUTPATIENT)
Dept: LAB | Facility: MEDICAL CENTER | Age: 38
End: 2018-05-01
Attending: INTERNAL MEDICINE
Payer: COMMERCIAL

## 2018-05-01 LAB
ALBUMIN SERPL BCP-MCNC: 3.8 G/DL (ref 3.2–4.9)
ALBUMIN/GLOB SERPL: 1.4 G/DL
ALP SERPL-CCNC: 70 U/L (ref 30–99)
ALT SERPL-CCNC: 43 U/L (ref 2–50)
ANION GAP SERPL CALC-SCNC: 9 MMOL/L (ref 0–11.9)
AST SERPL-CCNC: 38 U/L (ref 12–45)
BILIRUB SERPL-MCNC: 0.2 MG/DL (ref 0.1–1.5)
BUN SERPL-MCNC: 16 MG/DL (ref 8–22)
CALCIUM SERPL-MCNC: 9.7 MG/DL (ref 8.5–10.5)
CHLORIDE SERPL-SCNC: 102 MMOL/L (ref 96–112)
CO2 SERPL-SCNC: 26 MMOL/L (ref 20–33)
CREAT SERPL-MCNC: 0.68 MG/DL (ref 0.5–1.4)
GLOBULIN SER CALC-MCNC: 2.8 G/DL (ref 1.9–3.5)
GLUCOSE SERPL-MCNC: 100 MG/DL (ref 65–99)
POTASSIUM SERPL-SCNC: 4.1 MMOL/L (ref 3.6–5.5)
PROT SERPL-MCNC: 6.6 G/DL (ref 6–8.2)
SODIUM SERPL-SCNC: 137 MMOL/L (ref 135–145)

## 2018-05-01 PROCEDURE — 80053 COMPREHEN METABOLIC PANEL: CPT

## 2018-05-16 ENCOUNTER — HOSPITAL ENCOUNTER (OUTPATIENT)
Facility: MEDICAL CENTER | Age: 38
End: 2018-05-16
Attending: SURGERY | Admitting: SURGERY
Payer: COMMERCIAL

## 2018-05-16 VITALS
DIASTOLIC BLOOD PRESSURE: 74 MMHG | HEIGHT: 64 IN | TEMPERATURE: 97.6 F | SYSTOLIC BLOOD PRESSURE: 115 MMHG | BODY MASS INDEX: 36.51 KG/M2 | WEIGHT: 213.85 LBS | OXYGEN SATURATION: 96 % | RESPIRATION RATE: 16 BRPM | HEART RATE: 74 BPM

## 2018-05-16 LAB
GRAM STN SPEC: NORMAL
HCG SERPL QL: NEGATIVE
SIGNIFICANT IND 70042: NORMAL
SITE SITE: NORMAL
SOURCE SOURCE: NORMAL

## 2018-05-16 PROCEDURE — 160048 HCHG OR STATISTICAL LEVEL 1-5: Performed by: SURGERY

## 2018-05-16 PROCEDURE — 501838 HCHG SUTURE GENERAL: Performed by: SURGERY

## 2018-05-16 PROCEDURE — 700102 HCHG RX REV CODE 250 W/ 637 OVERRIDE(OP)

## 2018-05-16 PROCEDURE — 700111 HCHG RX REV CODE 636 W/ 250 OVERRIDE (IP)

## 2018-05-16 PROCEDURE — 160029 HCHG SURGERY MINUTES - 1ST 30 MINS LEVEL 4: Performed by: SURGERY

## 2018-05-16 PROCEDURE — 700101 HCHG RX REV CODE 250

## 2018-05-16 PROCEDURE — 84703 CHORIONIC GONADOTROPIN ASSAY: CPT

## 2018-05-16 PROCEDURE — 87205 SMEAR GRAM STAIN: CPT

## 2018-05-16 PROCEDURE — 160009 HCHG ANES TIME/MIN: Performed by: SURGERY

## 2018-05-16 PROCEDURE — A9270 NON-COVERED ITEM OR SERVICE: HCPCS

## 2018-05-16 PROCEDURE — 160002 HCHG RECOVERY MINUTES (STAT): Performed by: SURGERY

## 2018-05-16 PROCEDURE — 87186 SC STD MICRODIL/AGAR DIL: CPT

## 2018-05-16 PROCEDURE — 88305 TISSUE EXAM BY PATHOLOGIST: CPT

## 2018-05-16 PROCEDURE — 87070 CULTURE OTHR SPECIMN AEROBIC: CPT

## 2018-05-16 PROCEDURE — 160035 HCHG PACU - 1ST 60 MINS PHASE I: Performed by: SURGERY

## 2018-05-16 PROCEDURE — 160036 HCHG PACU - EA ADDL 30 MINS PHASE I: Performed by: SURGERY

## 2018-05-16 PROCEDURE — 87075 CULTR BACTERIA EXCEPT BLOOD: CPT

## 2018-05-16 PROCEDURE — 160041 HCHG SURGERY MINUTES - EA ADDL 1 MIN LEVEL 4: Performed by: SURGERY

## 2018-05-16 PROCEDURE — 87077 CULTURE AEROBIC IDENTIFY: CPT

## 2018-05-16 RX ORDER — LIDOCAINE HYDROCHLORIDE 10 MG/ML
INJECTION, SOLUTION EPIDURAL; INFILTRATION; INTRACAUDAL; PERINEURAL
Status: DISCONTINUED
Start: 2018-05-16 | End: 2018-05-16 | Stop reason: HOSPADM

## 2018-05-16 RX ORDER — ASCORBIC ACID 500 MG
500 TABLET ORAL DAILY
COMMUNITY
End: 2019-01-24

## 2018-05-16 RX ORDER — OMEPRAZOLE 20 MG/1
20 CAPSULE, DELAYED RELEASE ORAL DAILY
Status: ON HOLD | COMMUNITY
End: 2018-06-20

## 2018-05-16 RX ORDER — GABAPENTIN 300 MG/1
300 CAPSULE ORAL 3 TIMES DAILY
COMMUNITY
End: 2018-06-15

## 2018-05-16 RX ORDER — UBIDECARENONE 100 MG
CAPSULE ORAL
Status: ON HOLD | COMMUNITY
End: 2018-05-16

## 2018-05-16 RX ORDER — GABAPENTIN 300 MG/1
CAPSULE ORAL
Status: COMPLETED
Start: 2018-05-16 | End: 2018-05-16

## 2018-05-16 RX ORDER — SODIUM CHLORIDE, SODIUM LACTATE, POTASSIUM CHLORIDE, CALCIUM CHLORIDE 600; 310; 30; 20 MG/100ML; MG/100ML; MG/100ML; MG/100ML
INJECTION, SOLUTION INTRAVENOUS CONTINUOUS
Status: DISCONTINUED | OUTPATIENT
Start: 2018-05-16 | End: 2018-05-16 | Stop reason: HOSPADM

## 2018-05-16 RX ORDER — BUPIVACAINE HYDROCHLORIDE 2.5 MG/ML
INJECTION, SOLUTION EPIDURAL; INFILTRATION; INTRACAUDAL
Status: DISCONTINUED
Start: 2018-05-16 | End: 2018-05-16 | Stop reason: HOSPADM

## 2018-05-16 RX ORDER — ACETAMINOPHEN 500 MG
TABLET ORAL
Status: COMPLETED
Start: 2018-05-16 | End: 2018-05-16

## 2018-05-16 RX ORDER — BACITRACIN 50000 [IU]/1
INJECTION, POWDER, FOR SOLUTION INTRAMUSCULAR
Status: DISCONTINUED
Start: 2018-05-16 | End: 2018-05-16 | Stop reason: HOSPADM

## 2018-05-16 RX ORDER — BUPIVACAINE HYDROCHLORIDE AND EPINEPHRINE 2.5; 5 MG/ML; UG/ML
INJECTION, SOLUTION INFILTRATION; PERINEURAL
Status: DISCONTINUED | OUTPATIENT
Start: 2018-05-16 | End: 2018-05-16 | Stop reason: HOSPADM

## 2018-05-16 RX ORDER — EPINEPHRINE 1 MG/ML
INJECTION INTRAMUSCULAR; INTRAVENOUS; SUBCUTANEOUS
Status: DISCONTINUED
Start: 2018-05-16 | End: 2018-05-16 | Stop reason: HOSPADM

## 2018-05-16 RX ORDER — ALPRAZOLAM 0.25 MG/1
0.25 TABLET ORAL NIGHTLY PRN
COMMUNITY
End: 2018-11-05 | Stop reason: SDUPTHER

## 2018-05-16 RX ORDER — CELECOXIB 200 MG/1
CAPSULE ORAL
Status: COMPLETED
Start: 2018-05-16 | End: 2018-05-16

## 2018-05-16 RX ORDER — ERGOCALCIFEROL 1.25 MG/1
50000 CAPSULE ORAL
COMMUNITY
End: 2021-06-18

## 2018-05-16 RX ORDER — BACITRACIN 50000 [IU]/1
INJECTION, POWDER, FOR SOLUTION INTRAMUSCULAR
Status: DISCONTINUED | OUTPATIENT
Start: 2018-05-16 | End: 2018-05-16 | Stop reason: HOSPADM

## 2018-05-16 RX ADMIN — GABAPENTIN 300 MG: 300 CAPSULE ORAL at 06:45

## 2018-05-16 RX ADMIN — ACETAMINOPHEN 1000 MG: 500 TABLET, FILM COATED ORAL at 06:45

## 2018-05-16 RX ADMIN — CELECOXIB 200 MG: 200 CAPSULE ORAL at 06:45

## 2018-05-16 RX ADMIN — SODIUM CHLORIDE, SODIUM LACTATE, POTASSIUM CHLORIDE, CALCIUM CHLORIDE: 600; 310; 30; 20 INJECTION, SOLUTION INTRAVENOUS at 06:35

## 2018-05-16 ASSESSMENT — PAIN SCALES - GENERAL
PAINLEVEL_OUTOF10: 0
PAINLEVEL_OUTOF10: 0
PAINLEVEL_OUTOF10: 5
PAINLEVEL_OUTOF10: 0

## 2018-05-16 NOTE — OR SURGEON
Immediate Post OP Note    PreOp Diagnosis:   1.  Left breast wound, threatened implant exposure  2.  History of breast cancer s/p bilateral mastectomy with reconstruction  3.  Genetic susceptibility to breast cancer (BRCA+)    PostOp Diagnosis: same    Procedure(s):  FLAP GRAFT-   ADJACENT TISSUE TRANSFER OR REARRANGEMENT, TRUNK    Surgeon(s):  Niki Osuna M.D.    Anesthesiologist/Type of Anesthesia:  Anesthesiologist: Sharron Camarillo M.D./* No anesthesia type entered *    Surgical Staff:  Circulator: Abel Fernandez R.N.  Scrub Person: Piedad Garcia    Specimens removed if any:  * No specimens in log *    Estimated Blood Loss: min    Findings:      Complications: none        5/16/2018 7:53 AM Niki Osuna M.D.  [id number]

## 2018-05-16 NOTE — PROGRESS NOTES
0846 pt adm to PACU from OR via [facility] by RN and Anesthesia. Report received and care assumed. Monitors on - VSS, breathing even and unlabored.  Ace wrap around chest - TAYE - x2 DARA drains to suctions  0900 - Pt awake - denies pain or nausea  1005 discharge instructions reviewed with pt and family. All questions and concerns addressed.  1011 pt discharged to private car - acc by family

## 2018-05-16 NOTE — OP REPORT
DATE OF SERVICE:  05/16/2018    PREOPERATIVE DIAGNOSES:  1.  Left breast wound, threatened implant exposure.  2.  History of breast cancer, status post bilateral mastectomy with expander   reconstruction.  3.  Genetic susceptibility to breast cancer (BRCA positive).    POSTOPERATIVE DIAGNOSES:  1.  Left breast wound, threatened implant exposure.  2.  History of breast cancer, status post bilateral mastectomy with expander   reconstruction.  3.  Genetic susceptibility to breast cancer (BRCA positive).    PROCEDURE:  1.  Excision of wounds, left breast.  2.  Local flap closure, wounds (total defect size 8x4 cm).    SURGEON:  Niki Osuna MD.    ASSISTANT:  None.    ANESTHESIA:  General.    ANESTHESIOLOGIST:  Sharron Camarillo MD.    SPECIMEN:  None.    ESTIMATED BLOOD LOSS:  Minimal.    DRAINS:  Previously placed 15 round.    COMPLICATIONS:  None.    PROCEDURE IN DETAIL:  After satisfactory level of general anesthesia, the   patient's breasts were prepped and draped in a sterile manner.  Patient had   been marked preoperatively for excision of left breast wounds along with local   flap closure.    There were 2 wounds involving the left breast, one near the inframammary   crease which was approximately 1x2 cm and another necrotic area just below the   areola complex which was much larger measuring approximately 5x2 cm.  Both of   these areas were excised, the eschar as well as the wound in the inframammary   crease.  The eschar was excised using an ellipse as well as the wound in the   inframammary crease.  In both areas, the wounds were very superficial with the   expander just under the area.  There was AlloDerm in the lower pole of the   breast, which was the only thing protecting the expander.  It was difficult to   say whether the pocket communicated with the skin surface, but both wounds   were debrided full thickness and both wounds were sent to pathology.  The   expander pocket was irrigated after cultures  were sent of the periprosthetic   fluid.  After antibiotic irrigation of the pocket, both wounds were closed   using Burow's triangles in order to advance the tissue to close.  This ended   up producing a Wise pattern type closure.  Closure was performed using 3-0   Monocryl in the deep dermis and 4-0 Monocryl intracuticular suture.    Steri-Strips were applied to incisions along with Mastisol and overlying   compressive wrap.  A total defect size prior to closure was approximately 8x5   cm.  Patient tolerated the procedure well.  She will be transferred to   recovery where she will be followed as an outpatient.       ____________________________________     DIOR GRANDE MD LMT / SHELLY    DD:  05/16/2018 08:47:45  DT:  05/16/2018 11:08:26    D#:  [id number]  Job#:  [id number]

## 2018-05-16 NOTE — DISCHARGE INSTRUCTIONS
ACTIVITY: Rest and take it easy for the first 24 hours.  A responsible adult is recommended to remain with you during that time.  It is normal to feel sleepy.  We encourage you to not do anything that requires balance, judgment or coordination.    MILD FLU-LIKE SYMPTOMS ARE NORMAL. YOU MAY EXPERIENCE GENERALIZED MUSCLE ACHES, THROAT IRRITATION, HEADACHE AND/OR SOME NAUSEA.    FOR 24 HOURS DO NOT:  Drive, operate machinery or run household appliances.  Drink beer or alcoholic beverages.   Make important decisions or sign legal documents.    SPECIAL INSTRUCTIONS: PER MD    DIET: To avoid nausea, slowly advance diet as tolerated, avoiding spicy or greasy foods for the first day.  Add more substantial food to your diet according to your physician's instructions.  Babies can be fed formula or breast milk as soon as they are hungry.  INCREASE FLUIDS AND FIBER TO AVOID CONSTIPATION.    SURGICAL DRESSING/BATHING: PER MD  May remove ACE in 2 days then wear loose non-wire bra thereafter.  Sponge bath only   KEEP THE DRESSING CLEAN AND DRY     FOLLOW-UP APPOINTMENT:  A follow-up appointment should be arranged with your doctor in PER MD; call to schedule.    You should CALL YOUR PHYSICIAN if you develop:  Fever greater than 101 degrees F.  Pain not relieved by medication, or persistent nausea or vomiting.  Excessive bleeding (blood soaking through dressing) or unexpected drainage from the wound.  Extreme redness or swelling around the incision site, drainage of pus or foul smelling drainage.  Inability to urinate or empty your bladder within 8 hours.  Problems with breathing or chest pain.    You should call 911 if you develop problems with breathing or chest pain.  If you are unable to contact your doctor or surgical center, you should go to the nearest emergency room or urgent care center.  Physician's telephone #: [phone]    If any questions arise, call your doctor.  If your doctor is not available, please feel free to  call the Surgical Center at [phone].  The Center is open Monday through Friday from 7AM to 7PM.  You can also call the HEALTH HOTLINE open 24 hours/day, 7 days/week and speak to a nurse at [phone], or toll free at [phone].    A registered nurse may call you a few days after your surgery to see how you are doing after your procedure.    MEDICATIONS: Resume taking daily medication.  Take prescribed pain medication with food.  If no medication is prescribed, you may take non-aspirin pain medication if needed.  PAIN MEDICATION CAN BE VERY CONSTIPATING.  Take a stool softener or laxative such as senokot, pericolace, or milk of magnesia if needed.    Prescription given pre0 operatively.  Last pain medication given at na.    If your physician has prescribed pain medication that includes Acetaminophen (Tylenol), do not take additional Acetaminophen (Tylenol) while taking the prescribed medication.    Depression / Suicide Risk    As you are discharged from this [facility] facility, it is important to learn how to keep safe from harming yourself.    Recognize the warning signs:  · Abrupt changes in personality, positive or negative- including increase in energy   · Giving away possessions  · Change in eating patterns- significant weight changes-  positive or negative  · Change in sleeping patterns- unable to sleep or sleeping all the time   · Unwillingness or inability to communicate  · Depression  · Unusual sadness, discouragement and loneliness  · Talk of wanting to die  · Neglect of personal appearance   · Rebelliousness- reckless behavior  · Withdrawal from people/activities they love  · Confusion- inability to concentrate     If you or a loved one observes any of these behaviors or has concerns about self-harm, here's what you can do:  · Talk about it- your feelings and reasons for harming yourself  · Remove any means that you might use to hurt yourself (examples: pills, rope, extension cords,  firearm)  · Get professional help from the community (Mental Health, Substance Abuse, psychological counseling)  · Do not be alone:Call your Safe Contact- someone whom you trust who will be there for you.  · Call your local CRISIS HOTLINE 436-7906 or 738-943-0015  · Call your local Children's Mobile Crisis Response Team [place] (447) 898-5496 or www.[employer]  · Call the toll free National Suicide Prevention Hotlines   · National Suicide Prevention Lifeline 137-621-YBVP (8240)  · National Hope Line Network 800-SUICIDE (618-5350)

## 2018-05-18 LAB
BACTERIA WND AEROBE CULT: ABNORMAL
BACTERIA WND AEROBE CULT: ABNORMAL
GRAM STN SPEC: ABNORMAL
SIGNIFICANT IND 70042: ABNORMAL
SITE SITE: ABNORMAL
SOURCE SOURCE: ABNORMAL

## 2018-05-19 LAB
BACTERIA SPEC ANAEROBE CULT: NORMAL
SIGNIFICANT IND 70042: NORMAL
SITE SITE: NORMAL
SOURCE SOURCE: NORMAL

## 2018-06-05 ENCOUNTER — APPOINTMENT (OUTPATIENT)
Dept: BEHAVIORAL HEALTH | Facility: PHYSICIAN GROUP | Age: 38
End: 2018-06-05
Payer: COMMERCIAL

## 2018-06-15 ENCOUNTER — HOSPITAL ENCOUNTER (INPATIENT)
Facility: MEDICAL CENTER | Age: 38
LOS: 5 days | DRG: 909 | End: 2018-06-20
Attending: EMERGENCY MEDICINE | Admitting: HOSPITALIST
Payer: COMMERCIAL

## 2018-06-15 ENCOUNTER — APPOINTMENT (OUTPATIENT)
Dept: RADIOLOGY | Facility: MEDICAL CENTER | Age: 38
DRG: 909 | End: 2018-06-15
Attending: EMERGENCY MEDICINE
Payer: COMMERCIAL

## 2018-06-15 DIAGNOSIS — C50.912 MALIGNANT NEOPLASM OF LEFT FEMALE BREAST, UNSPECIFIED ESTROGEN RECEPTOR STATUS, UNSPECIFIED SITE OF BREAST (HCC): ICD-10-CM

## 2018-06-15 DIAGNOSIS — L03.313 CELLULITIS OF CHEST WALL: ICD-10-CM

## 2018-06-15 DIAGNOSIS — T85.79XD INFECTION OF BREAST TISSUE EXPANDER, SUBSEQUENT ENCOUNTER: ICD-10-CM

## 2018-06-15 PROBLEM — L03.90 CELLULITIS: Status: ACTIVE | Noted: 2018-06-15

## 2018-06-15 PROBLEM — C50.919 BREAST CANCER (HCC): Status: ACTIVE | Noted: 2018-06-15

## 2018-06-15 LAB
ALBUMIN SERPL BCP-MCNC: 4.3 G/DL (ref 3.2–4.9)
ALBUMIN/GLOB SERPL: 1 G/DL
ALP SERPL-CCNC: 78 U/L (ref 30–99)
ALT SERPL-CCNC: 15 U/L (ref 2–50)
ANION GAP SERPL CALC-SCNC: 10 MMOL/L (ref 0–11.9)
APTT PPP: 30.2 SEC (ref 24.7–36)
AST SERPL-CCNC: 17 U/L (ref 12–45)
BASOPHILS # BLD AUTO: 0.5 % (ref 0–1.8)
BASOPHILS # BLD: 0.03 K/UL (ref 0–0.12)
BILIRUB SERPL-MCNC: 0.3 MG/DL (ref 0.1–1.5)
BUN SERPL-MCNC: 11 MG/DL (ref 8–22)
CALCIUM SERPL-MCNC: 9.4 MG/DL (ref 8.5–10.5)
CHLORIDE SERPL-SCNC: 102 MMOL/L (ref 96–112)
CO2 SERPL-SCNC: 23 MMOL/L (ref 20–33)
CREAT SERPL-MCNC: 0.74 MG/DL (ref 0.5–1.4)
EOSINOPHIL # BLD AUTO: 0.13 K/UL (ref 0–0.51)
EOSINOPHIL NFR BLD: 2.1 % (ref 0–6.9)
ERYTHROCYTE [DISTWIDTH] IN BLOOD BY AUTOMATED COUNT: 44.4 FL (ref 35.9–50)
GLOBULIN SER CALC-MCNC: 4.2 G/DL (ref 1.9–3.5)
GLUCOSE SERPL-MCNC: 99 MG/DL (ref 65–99)
HCT VFR BLD AUTO: 34.2 % (ref 37–47)
HGB BLD-MCNC: 11.3 G/DL (ref 12–16)
IMM GRANULOCYTES # BLD AUTO: 0.03 K/UL (ref 0–0.11)
IMM GRANULOCYTES NFR BLD AUTO: 0.5 % (ref 0–0.9)
INR PPP: 1.14 (ref 0.87–1.13)
LACTATE BLD-SCNC: 0.8 MMOL/L (ref 0.5–2)
LYMPHOCYTES # BLD AUTO: 1.29 K/UL (ref 1–4.8)
LYMPHOCYTES NFR BLD: 20.4 % (ref 22–41)
MCH RBC QN AUTO: 28.7 PG (ref 27–33)
MCHC RBC AUTO-ENTMCNC: 33 G/DL (ref 33.6–35)
MCV RBC AUTO: 86.8 FL (ref 81.4–97.8)
MONOCYTES # BLD AUTO: 0.5 K/UL (ref 0–0.85)
MONOCYTES NFR BLD AUTO: 7.9 % (ref 0–13.4)
NEUTROPHILS # BLD AUTO: 4.33 K/UL (ref 2–7.15)
NEUTROPHILS NFR BLD: 68.6 % (ref 44–72)
NRBC # BLD AUTO: 0 K/UL
NRBC BLD-RTO: 0 /100 WBC
PLATELET # BLD AUTO: 335 K/UL (ref 164–446)
PMV BLD AUTO: 8.8 FL (ref 9–12.9)
POTASSIUM SERPL-SCNC: 4.2 MMOL/L (ref 3.6–5.5)
PROT SERPL-MCNC: 8.5 G/DL (ref 6–8.2)
PROTHROMBIN TIME: 14.3 SEC (ref 12–14.6)
RBC # BLD AUTO: 3.94 M/UL (ref 4.2–5.4)
SODIUM SERPL-SCNC: 135 MMOL/L (ref 135–145)
WBC # BLD AUTO: 6.3 K/UL (ref 4.8–10.8)

## 2018-06-15 PROCEDURE — 700111 HCHG RX REV CODE 636 W/ 250 OVERRIDE (IP): Performed by: HOSPITALIST

## 2018-06-15 PROCEDURE — 85025 COMPLETE CBC W/AUTO DIFF WBC: CPT

## 2018-06-15 PROCEDURE — 80053 COMPREHEN METABOLIC PANEL: CPT

## 2018-06-15 PROCEDURE — 700105 HCHG RX REV CODE 258

## 2018-06-15 PROCEDURE — 87077 CULTURE AEROBIC IDENTIFY: CPT

## 2018-06-15 PROCEDURE — 770006 HCHG ROOM/CARE - MED/SURG/GYN SEMI*

## 2018-06-15 PROCEDURE — 700111 HCHG RX REV CODE 636 W/ 250 OVERRIDE (IP): Performed by: FAMILY MEDICINE

## 2018-06-15 PROCEDURE — 85730 THROMBOPLASTIN TIME PARTIAL: CPT

## 2018-06-15 PROCEDURE — 99223 1ST HOSP IP/OBS HIGH 75: CPT | Performed by: HOSPITALIST

## 2018-06-15 PROCEDURE — 99285 EMERGENCY DEPT VISIT HI MDM: CPT

## 2018-06-15 PROCEDURE — 83605 ASSAY OF LACTIC ACID: CPT

## 2018-06-15 PROCEDURE — 700105 HCHG RX REV CODE 258: Performed by: HOSPITALIST

## 2018-06-15 PROCEDURE — A9270 NON-COVERED ITEM OR SERVICE: HCPCS | Performed by: HOSPITALIST

## 2018-06-15 PROCEDURE — 87150 DNA/RNA AMPLIFIED PROBE: CPT

## 2018-06-15 PROCEDURE — 96365 THER/PROPH/DIAG IV INF INIT: CPT

## 2018-06-15 PROCEDURE — 700102 HCHG RX REV CODE 250 W/ 637 OVERRIDE(OP): Performed by: HOSPITALIST

## 2018-06-15 PROCEDURE — 76642 ULTRASOUND BREAST LIMITED: CPT | Mod: RT

## 2018-06-15 PROCEDURE — 87040 BLOOD CULTURE FOR BACTERIA: CPT

## 2018-06-15 PROCEDURE — 306580 SET INFUSION,POWERLOC 20G X.75: Performed by: FAMILY MEDICINE

## 2018-06-15 PROCEDURE — 85610 PROTHROMBIN TIME: CPT

## 2018-06-15 RX ORDER — ACETAMINOPHEN 325 MG/1
650 TABLET ORAL EVERY 6 HOURS PRN
Status: DISCONTINUED | OUTPATIENT
Start: 2018-06-15 | End: 2018-06-20 | Stop reason: HOSPADM

## 2018-06-15 RX ORDER — DIPHENHYDRAMINE HYDROCHLORIDE 50 MG/ML
25 INJECTION INTRAMUSCULAR; INTRAVENOUS EVERY 6 HOURS PRN
Status: DISCONTINUED | OUTPATIENT
Start: 2018-06-15 | End: 2018-06-20 | Stop reason: HOSPADM

## 2018-06-15 RX ORDER — IBUPROFEN 200 MG
600 TABLET ORAL 2 TIMES DAILY PRN
Status: ON HOLD | COMMUNITY
End: 2018-06-18

## 2018-06-15 RX ORDER — ONDANSETRON 2 MG/ML
4 INJECTION INTRAMUSCULAR; INTRAVENOUS EVERY 4 HOURS PRN
Status: DISCONTINUED | OUTPATIENT
Start: 2018-06-15 | End: 2018-06-20 | Stop reason: HOSPADM

## 2018-06-15 RX ORDER — OXYCODONE HYDROCHLORIDE 5 MG/1
2.5 TABLET ORAL
Status: DISCONTINUED | OUTPATIENT
Start: 2018-06-15 | End: 2018-06-19

## 2018-06-15 RX ORDER — LIDOCAINE AND PRILOCAINE 25; 25 MG/G; MG/G
CREAM TOPICAL ONCE
Status: DISPENSED | OUTPATIENT
Start: 2018-06-15 | End: 2018-06-16

## 2018-06-15 RX ORDER — AMOXICILLIN 250 MG
2 CAPSULE ORAL 2 TIMES DAILY
Status: DISCONTINUED | OUTPATIENT
Start: 2018-06-15 | End: 2018-06-20 | Stop reason: HOSPADM

## 2018-06-15 RX ORDER — OXYCODONE HYDROCHLORIDE 5 MG/1
5 TABLET ORAL
Status: DISCONTINUED | OUTPATIENT
Start: 2018-06-15 | End: 2018-06-19

## 2018-06-15 RX ORDER — PROMETHAZINE HYDROCHLORIDE 25 MG/1
12.5-25 TABLET ORAL EVERY 4 HOURS PRN
Status: DISCONTINUED | OUTPATIENT
Start: 2018-06-15 | End: 2018-06-20 | Stop reason: HOSPADM

## 2018-06-15 RX ORDER — ASCORBIC ACID 500 MG
500 TABLET ORAL DAILY
Status: DISCONTINUED | OUTPATIENT
Start: 2018-06-16 | End: 2018-06-20 | Stop reason: HOSPADM

## 2018-06-15 RX ORDER — ONDANSETRON 4 MG/1
4 TABLET, ORALLY DISINTEGRATING ORAL EVERY 4 HOURS PRN
Status: DISCONTINUED | OUTPATIENT
Start: 2018-06-15 | End: 2018-06-20 | Stop reason: HOSPADM

## 2018-06-15 RX ORDER — PROMETHAZINE HYDROCHLORIDE 12.5 MG/1
12.5-25 SUPPOSITORY RECTAL EVERY 4 HOURS PRN
Status: DISCONTINUED | OUTPATIENT
Start: 2018-06-15 | End: 2018-06-20 | Stop reason: HOSPADM

## 2018-06-15 RX ORDER — SODIUM CHLORIDE 9 MG/ML
INJECTION, SOLUTION INTRAVENOUS
Status: COMPLETED
Start: 2018-06-15 | End: 2018-06-15

## 2018-06-15 RX ORDER — ALPRAZOLAM 0.25 MG/1
0.25 TABLET ORAL 3 TIMES DAILY PRN
Status: DISCONTINUED | OUTPATIENT
Start: 2018-06-15 | End: 2018-06-20 | Stop reason: HOSPADM

## 2018-06-15 RX ORDER — BISACODYL 10 MG
10 SUPPOSITORY, RECTAL RECTAL
Status: DISCONTINUED | OUTPATIENT
Start: 2018-06-15 | End: 2018-06-20 | Stop reason: HOSPADM

## 2018-06-15 RX ORDER — MORPHINE SULFATE 4 MG/ML
2 INJECTION, SOLUTION INTRAMUSCULAR; INTRAVENOUS
Status: DISCONTINUED | OUTPATIENT
Start: 2018-06-15 | End: 2018-06-20 | Stop reason: HOSPADM

## 2018-06-15 RX ORDER — OMEPRAZOLE 20 MG/1
20 CAPSULE, DELAYED RELEASE ORAL DAILY
Status: DISCONTINUED | OUTPATIENT
Start: 2018-06-16 | End: 2018-06-19

## 2018-06-15 RX ORDER — POLYETHYLENE GLYCOL 3350 17 G/17G
1 POWDER, FOR SOLUTION ORAL
Status: DISCONTINUED | OUTPATIENT
Start: 2018-06-15 | End: 2018-06-20 | Stop reason: HOSPADM

## 2018-06-15 RX ADMIN — DIPHENHYDRAMINE HYDROCHLORIDE 25 MG: 50 INJECTION, SOLUTION INTRAMUSCULAR; INTRAVENOUS at 20:23

## 2018-06-15 RX ADMIN — DOCUSATE SODIUM AND SENNOSIDES 2 TABLET: 8.6; 5 TABLET, FILM COATED ORAL at 20:30

## 2018-06-15 RX ADMIN — VANCOMYCIN HYDROCHLORIDE 2400 MG: 100 INJECTION, POWDER, LYOPHILIZED, FOR SOLUTION INTRAVENOUS at 17:48

## 2018-06-15 RX ADMIN — ACETAMINOPHEN 650 MG: 325 TABLET, FILM COATED ORAL at 20:30

## 2018-06-15 RX ADMIN — PIPERACILLIN AND TAZOBACTAM 4.5 G: 4; .5 INJECTION, POWDER, LYOPHILIZED, FOR SOLUTION INTRAVENOUS; PARENTERAL at 22:45

## 2018-06-15 RX ADMIN — PIPERACILLIN AND TAZOBACTAM 4.5 G: 4; .5 INJECTION, POWDER, LYOPHILIZED, FOR SOLUTION INTRAVENOUS; PARENTERAL at 15:34

## 2018-06-15 RX ADMIN — SODIUM CHLORIDE 500 ML: 9 INJECTION, SOLUTION INTRAVENOUS at 16:47

## 2018-06-15 ASSESSMENT — PATIENT HEALTH QUESTIONNAIRE - PHQ9
6. FEELING BAD ABOUT YOURSELF - OR THAT YOU ARE A FAILURE OR HAVE LET YOURSELF OR YOUR FAMILY DOWN: MORE THAN HALF THE DAYS
9. THOUGHTS THAT YOU WOULD BE BETTER OFF DEAD, OR OF HURTING YOURSELF: NOT AT ALL
7. TROUBLE CONCENTRATING ON THINGS, SUCH AS READING THE NEWSPAPER OR WATCHING TELEVISION: SEVERAL DAYS
4. FEELING TIRED OR HAVING LITTLE ENERGY: SEVERAL DAYS
SUM OF ALL RESPONSES TO PHQ9 QUESTIONS 1 AND 2: 2
SUM OF ALL RESPONSES TO PHQ QUESTIONS 1-9: 7
3. TROUBLE FALLING OR STAYING ASLEEP OR SLEEPING TOO MUCH: NOT AT ALL
5. POOR APPETITE OR OVEREATING: SEVERAL DAYS
2. FEELING DOWN, DEPRESSED, IRRITABLE, OR HOPELESS: SEVERAL DAYS
8. MOVING OR SPEAKING SO SLOWLY THAT OTHER PEOPLE COULD HAVE NOTICED. OR THE OPPOSITE, BEING SO FIGETY OR RESTLESS THAT YOU HAVE BEEN MOVING AROUND A LOT MORE THAN USUAL: NOT AT ALL
1. LITTLE INTEREST OR PLEASURE IN DOING THINGS: SEVERAL DAYS

## 2018-06-15 ASSESSMENT — COPD QUESTIONNAIRES
DO YOU EVER COUGH UP ANY MUCUS OR PHLEGM?: NO/ONLY WITH OCCASIONAL COLDS OR INFECTIONS
DURING THE PAST 4 WEEKS HOW MUCH DID YOU FEEL SHORT OF BREATH: NONE/LITTLE OF THE TIME
COPD SCREENING SCORE: 2
IN THE PAST 12 MONTHS DO YOU DO LESS THAN YOU USED TO BECAUSE OF YOUR BREATHING PROBLEMS: DISAGREE/UNSURE
HAVE YOU SMOKED AT LEAST 100 CIGARETTES IN YOUR ENTIRE LIFE: YES

## 2018-06-15 ASSESSMENT — LIFESTYLE VARIABLES
TOTAL SCORE: 0
HOW MANY TIMES IN THE PAST YEAR HAVE YOU HAD 5 OR MORE DRINKS IN A DAY: 2
AVERAGE NUMBER OF DAYS PER WEEK YOU HAVE A DRINK CONTAINING ALCOHOL: 0
EVER HAD A DRINK FIRST THING IN THE MORNING TO STEADY YOUR NERVES TO GET RID OF A HANGOVER: NO
TOTAL SCORE: 0
HAVE PEOPLE ANNOYED YOU BY CRITICIZING YOUR DRINKING: NO
ALCOHOL_USE: YES
EVER FELT BAD OR GUILTY ABOUT YOUR DRINKING: NO
EVER_SMOKED: YES
HAVE YOU EVER FELT YOU SHOULD CUT DOWN ON YOUR DRINKING: NO
ON A TYPICAL DAY WHEN YOU DRINK ALCOHOL HOW MANY DRINKS DO YOU HAVE: 3
CONSUMPTION TOTAL: POSITIVE
TOTAL SCORE: 0

## 2018-06-15 ASSESSMENT — PAIN SCALES - GENERAL
PAINLEVEL_OUTOF10: 4

## 2018-06-15 ASSESSMENT — ENCOUNTER SYMPTOMS
NERVOUS/ANXIOUS: 1
NAUSEA: 0
DIZZINESS: 0
CLAUDICATION: 0
FEVER: 1
ORTHOPNEA: 0
HEMOPTYSIS: 0
CHILLS: 0
SPUTUM PRODUCTION: 0
INSOMNIA: 1
PALPITATIONS: 0
COUGH: 0
VOMITING: 0

## 2018-06-15 NOTE — PROGRESS NOTES
Two RN Skin Assessment:    Patient's skin was assessed under all medical devices and over all areas of bony prominence.    Areas noted:  Right breast,  redness noted    No other areas of skin breakdown were noted.

## 2018-06-15 NOTE — PROGRESS NOTES
"Pharmacy Kinetics 38 y.o. female on vancomycin day # 1  6/15/2018    Currently on Vancomycin 2400 mg IV x1 followed by 1600 mg IV q12h    Indication for Treatment: right breast cellulitis     Pertinent history per medical record: Admitted on 6/15/2018 for right breast cellulitis. Recent mastectomy 2 months ago. Left breast infection one month ago with PSAR. Now with pain, redness, swelling in the right breast. ID consulted.    Other antibiotics: Zosyn 4.5 g IV q6h    Allergies: Patient has no known allergies.     List concerns for renal function: BMI 36    Pertinent cultures to date:   6/15/18: Blood, peripheral x2 = in process  18: Wound, left breast = PSAR (panS)    Recent Labs      06/15/18   1254   WBC  6.3   NEUTSPOLYS  68.60     Recent Labs      06/15/18   1254   BUN  11   CREATININE  0.74   ALBUMIN  4.3     Blood pressure 134/78, pulse 73, temperature 36.5 °C (97.7 °F), temperature source Temporal, resp. rate 14, height 1.626 m (5' 4\"), weight 94.4 kg (208 lb 1.8 oz), SpO2 97 %. Temp (24hrs), Av.5 °C (97.7 °F), Min:36.5 °C (97.7 °F), Max:36.5 °C (97.7 °F)      A/P   1. Vancomycin dose change: new start  2. Next vancomycin level:  at 0430 (prior to 4th total dose)  3. Goal trough: 12-16 mcg/mL  4. Comments: Patient with recent PSAR infection so Unasyn changed to Zosyn. ID consulted. No real c/o accumulation. Scheduled dose. Trough in a couple of days.    Ludwin Norwood, PharmD, BCPS      "

## 2018-06-15 NOTE — ED PROVIDER NOTES
ED Provider Note    Scribed for Francisco Chavez M.D. by Brday Bowers. 6/15/2018, 12:31 PM.    Primary care provider: Pcp Pt States None  Means of arrival: walk in  History obtained from: patient  History limited by: none    CHIEF COMPLAINT  Chief Complaint   Patient presents with   • Breast Pain     R side. Bilateral breast mastectomy April 12th, 2018   • Breast Swelling       HPI  Pebbles Cosme is a 38 y.o. female with a history of mastectomy who presents to the Emergency Department complaining of gradually worsening right breast pain for the last 5 days. Patient reports associated redness, swelling, warmth, fever. She reports a history of breast cancer for which she had a mastectomy performed 2 months ago by Dr. Perkins. Patient reports that she contracted an infection of the left breast 1 month ago that was treated and resolved. She now localizes her symptoms to the right breast and presents today for evaluation for possible infection. Patient denies cough, vomiting.     REVIEW OF SYSTEMS  Pertinent positives include breast pain, swelling, erythema, warmth, fever. Pertinent negatives include no cough, vomiting. As above, all other systems reviewed and are negative.   See HPI for further details.   C    PAST MEDICAL HISTORY   has a past medical history of Anxiety; Cancer (HCC) (11/2017); Pain; and Psychiatric problem.    SURGICAL HISTORY   has a past surgical history that includes open reduction; other (10/11/2017); other (04/2018); other (03/2018); other; and flap graft (Right, 5/16/2018).    SOCIAL HISTORY  Social History   Substance Use Topics   • Smoking status: Former Smoker     Packs/day: 0.25     Types: Cigarettes   • Smokeless tobacco: Never Used   • Alcohol use Yes      Comment: Socially      History   Drug Use No       FAMILY HISTORY  Family History   Problem Relation Age of Onset   • Cancer Mother 45     ovarian ca   • Hypertension Father    • Heart Disease Father    • Hyperlipidemia  "Father        CURRENT MEDICATIONS  Home Medications     Reviewed by Cesia Ambrosio R.N. (Registered Nurse) on 06/15/18 at 1218  Med List Status: Partial   Medication Last Dose Status   ALPRAZolam (XANAX) 0.25 MG Tab 5/9/2018 Active   amoxicillin (AMOXIL) 500 MG Cap 5/1/2018 Active   ascorbic acid (ASCORBIC ACID) 500 MG Tab 5/14/2018 Active   cefadroxil (DURICEF) 500 MG capsule 5/14/2018 Active   DPH-Lido-AlHydr-MgHydr-Simeth (FIRST-MOUTHWASH BLM) SUSP 5/1/2018 Active   gabapentin (NEURONTIN) 300 MG Cap 5/14/2018 Active   HYDROcodone-acetaminophen (NORCO) 5-325 MG Tab per tablet 5/15/2018 Active   methocarbamol (ROBAXIN) 750 MG Tab 5/9/2018 Active   multivitamin (THERAGRAN) Tab 5/14/2018 Active   omeprazole (PRILOSEC) 20 MG delayed-release capsule 5/15/2018 Active   sulfamethoxazole-trimethoprim (BACTRIM DS) 800-160 MG tablet 5/1/2018 Active   terbinafine (LAMISIL AT) 1 % cream 5/1/2018 Active   vitamin D, Ergocalciferol, (DRISDOL) 48076 units Cap capsule 5/14/2018 Active                ALLERGIES  No Known Allergies    PHYSICAL EXAM  VITAL SIGNS: /78   Pulse 78   Temp 36.5 °C (97.7 °F) (Temporal)   Resp 14   Ht 1.626 m (5' 4\")   Wt 94.4 kg (208 lb 1.8 oz)   SpO2 97%   BMI 35.72 kg/m²   Vitals reviewed.  Constitutional: Alert in no apparent distress.  HENT: No signs of trauma, Bilateral external ears normal, Nose normal.   Eyes: Pupils are equal and reactive, Conjunctiva normal, Non-icteric.   Neck: Normal range of motion, No tenderness, Supple, No stridor.   Lymphatic: No lymphadenopathy noted.   Cardiovascular: Regular rate and rhythm, no murmurs.   Thorax & Lungs: Normal breath sounds, No respiratory distress, No wheezing,   Abdomen: Bowel sounds normal, Soft, No tenderness, No peritoneal signs, No masses, No pulsatile masses.   Skin: Warm, Dry, Erythema and induration of the entire right breast.  There is no obvious fluctuance.  The breast exam was done with female chaperone Niki " "RN.  Extremities: Intact distal pulses, No edema, No tenderness, No cyanosis  Musculoskeletal: Good range of motion in all major joints. No tenderness to palpation or major deformities noted.   Neurologic: Alert , Normal motor function, Normal sensory function, No focal deficits noted.   Psychiatric: Affect normal, Judgment normal, Mood normal.     DIAGNOSTIC STUDIES / PROCEDURES    LABS  Labs Reviewed   CBC WITH DIFFERENTIAL - Abnormal; Notable for the following:        Result Value    RBC 3.94 (*)     Hemoglobin 11.3 (*)     Hematocrit 34.2 (*)     MCHC 33.0 (*)     MPV 8.8 (*)     Lymphocytes 20.40 (*)     All other components within normal limits    Narrative:     Indicate which anticoagulants the patient is on:->NONE   COMP METABOLIC PANEL - Abnormal; Notable for the following:     Total Protein 8.5 (*)     Globulin 4.2 (*)     All other components within normal limits    Narrative:     Indicate which anticoagulants the patient is on:->NONE   PROTHROMBIN TIME - Abnormal; Notable for the following:     INR 1.14 (*)     All other components within normal limits    Narrative:     Indicate which anticoagulants the patient is on:->NONE   LACTIC ACID    Narrative:     Indicate which anticoagulants the patient is on:->NONE   BLOOD CULTURE    Narrative:     1 of 2 for Blood Culture x 2 sites order. Per Hospital  Policy: Only change Specimen Src: to \"Line\" if specified by  physician order.   BLOOD CULTURE    Narrative:     2 of 2 blood culture x2  Sites order. Per Hospital Policy:  Only change Specimen Src: to \"Line\" if specified by physician  order.   APTT    Narrative:     Indicate which anticoagulants the patient is on:->NONE   ESTIMATED GFR    Narrative:     Indicate which anticoagulants the patient is on:->NONE   All labs reviewed by me.    RADIOLOGY  US-BREAST LIMITED-RIGHT   Final Result      Postoperative changes right breast. Soft tissue edema consistent with inflammation.   No drainable abscess identified.    "   These results were given to the patient at the time of visit.         The radiologist's interpretation of all radiological studies have been reviewed by me.    COURSE & MEDICAL DECISION MAKING  Nursing notes, VS, PMSFHx reviewed in chart.  Differential diagnoses include but not limited to: cellulitis, sepsis    Obtained and reviewed past medical records which indicated she has a history of breast cancer and had surgery 1 month ago by Dr. Perkins.     12:31 PM Patient seen and examined at bedside. Physical exam with female nurse present conducted. Ordered for US breast, CBC with differential, Lactic acid, CMP, Blood culture x2, APTT, SD/INR to evaluate.     12:45 PM - Paged Dr. Perkins.    12:52 PM - I discussed the patient's case and the above findings with Dr. Perkins (plastic surgery) who will consult.  I will speak with the hospitalist to admit the patient.    1:34 PM - Paged jack.     1:36 PM - I discussed the patient's case and the above findings with Dr. Hicks (hospitalist) who agrees to admit the patient.     DISPOSITION:  Patient will be admitted to hospital in guarded condition.    FINAL IMPRESSION  1. Cellulitis of chest wall          IBrady (Scribe), am scribing for, and in the presence of, Francisco Chavez M.D..    Electronically signed by: Brady Bowers (Jasiel), 6/15/2018    IFrancisco M.D. personally performed the services described in this documentation, as scribed by Brady Bowers in my presence, and it is both accurate and complete.    The note accurately reflects work and decisions made by me.  Francisco Chavez  6/15/2018  3:00 PM

## 2018-06-15 NOTE — PROGRESS NOTES
"Pt arrived on unit with transport at 1600.     Reviewed VS, labs, orders, and notes.       Ambulated with steady gait and no assistance to bed 2 from [place]. A&Ox 4.    /78   Pulse 80   Temp 36.5 °C (97.7 °F) (Temporal)   Resp 14   Ht 1.626 m (5' 4\")   Wt 94.4 kg (208 lb 1.8 oz)   LMP 11/01/2017 (Approximate) Comment: last dose chemo 3/12/18.  SpO2 94%   Breastfeeding? No   BMI 35.72 kg/m² . MEWS Score: 1.     On room air. Denies SOB.    Moves all extremities, denies numbness or tingling.     Skin assessed over bony prominences and under medical devices.    Voiding, active BS, + flatus, LBM 6/15 PTA.     Regular diet, tolerating well, denies nausea/ vomiting.     Wound(s): left breast from previous surgery. JUAN.     Patient reports 4 /10 pain right breast, declines medication or other intervention.    Fall prevention education reinforced with pt. IV pole is on the same side as the bathroom, lower bedrails are down. Pt calls appropriatly.     Discussed POC, all questions answered at this time. Bed is locked and in the lowest position, call light within reach, Q 1 hour rounding in place. All needs met at this time.   "

## 2018-06-15 NOTE — ED TRIAGE NOTES
Amb to triage w/ c/o R breast pain, redness, swelling, warm to touch.  Recent hx of mastectomy w/ a tissue expander.  Pt has been taking Bactrim x 3 days and states she is feeling worse.

## 2018-06-16 LAB
ANION GAP SERPL CALC-SCNC: 8 MMOL/L (ref 0–11.9)
BASOPHILS # BLD AUTO: 0.3 % (ref 0–1.8)
BASOPHILS # BLD: 0.02 K/UL (ref 0–0.12)
BUN SERPL-MCNC: 10 MG/DL (ref 8–22)
CALCIUM SERPL-MCNC: 9.2 MG/DL (ref 8.5–10.5)
CHLORIDE SERPL-SCNC: 106 MMOL/L (ref 96–112)
CO2 SERPL-SCNC: 24 MMOL/L (ref 20–33)
CREAT SERPL-MCNC: 0.88 MG/DL (ref 0.5–1.4)
EOSINOPHIL # BLD AUTO: 0.2 K/UL (ref 0–0.51)
EOSINOPHIL NFR BLD: 3.4 % (ref 0–6.9)
ERYTHROCYTE [DISTWIDTH] IN BLOOD BY AUTOMATED COUNT: 46.1 FL (ref 35.9–50)
GLUCOSE SERPL-MCNC: 99 MG/DL (ref 65–99)
HCT VFR BLD AUTO: 32.8 % (ref 37–47)
HGB BLD-MCNC: 10.4 G/DL (ref 12–16)
IMM GRANULOCYTES # BLD AUTO: 0.02 K/UL (ref 0–0.11)
IMM GRANULOCYTES NFR BLD AUTO: 0.3 % (ref 0–0.9)
LYMPHOCYTES # BLD AUTO: 1.53 K/UL (ref 1–4.8)
LYMPHOCYTES NFR BLD: 26.1 % (ref 22–41)
MCH RBC QN AUTO: 27.9 PG (ref 27–33)
MCHC RBC AUTO-ENTMCNC: 31.7 G/DL (ref 33.6–35)
MCV RBC AUTO: 87.9 FL (ref 81.4–97.8)
MONOCYTES # BLD AUTO: 0.47 K/UL (ref 0–0.85)
MONOCYTES NFR BLD AUTO: 8 % (ref 0–13.4)
NEUTROPHILS # BLD AUTO: 3.63 K/UL (ref 2–7.15)
NEUTROPHILS NFR BLD: 61.9 % (ref 44–72)
NRBC # BLD AUTO: 0 K/UL
NRBC BLD-RTO: 0 /100 WBC
PLATELET # BLD AUTO: 349 K/UL (ref 164–446)
PMV BLD AUTO: 9.1 FL (ref 9–12.9)
POTASSIUM SERPL-SCNC: 3.9 MMOL/L (ref 3.6–5.5)
RBC # BLD AUTO: 3.73 M/UL (ref 4.2–5.4)
SODIUM SERPL-SCNC: 138 MMOL/L (ref 135–145)
WBC # BLD AUTO: 5.9 K/UL (ref 4.8–10.8)

## 2018-06-16 PROCEDURE — 700102 HCHG RX REV CODE 250 W/ 637 OVERRIDE(OP): Performed by: INTERNAL MEDICINE

## 2018-06-16 PROCEDURE — 36415 COLL VENOUS BLD VENIPUNCTURE: CPT

## 2018-06-16 PROCEDURE — 85025 COMPLETE CBC W/AUTO DIFF WBC: CPT

## 2018-06-16 PROCEDURE — 700105 HCHG RX REV CODE 258

## 2018-06-16 PROCEDURE — 80048 BASIC METABOLIC PNL TOTAL CA: CPT

## 2018-06-16 PROCEDURE — A9270 NON-COVERED ITEM OR SERVICE: HCPCS | Performed by: INTERNAL MEDICINE

## 2018-06-16 PROCEDURE — 700111 HCHG RX REV CODE 636 W/ 250 OVERRIDE (IP): Performed by: HOSPITALIST

## 2018-06-16 PROCEDURE — 99223 1ST HOSP IP/OBS HIGH 75: CPT | Performed by: INTERNAL MEDICINE

## 2018-06-16 PROCEDURE — A9270 NON-COVERED ITEM OR SERVICE: HCPCS | Performed by: HOSPITALIST

## 2018-06-16 PROCEDURE — 770006 HCHG ROOM/CARE - MED/SURG/GYN SEMI*

## 2018-06-16 PROCEDURE — 700105 HCHG RX REV CODE 258: Performed by: HOSPITALIST

## 2018-06-16 PROCEDURE — 99233 SBSQ HOSP IP/OBS HIGH 50: CPT | Performed by: HOSPITALIST

## 2018-06-16 PROCEDURE — 700102 HCHG RX REV CODE 250 W/ 637 OVERRIDE(OP): Performed by: HOSPITALIST

## 2018-06-16 PROCEDURE — 700111 HCHG RX REV CODE 636 W/ 250 OVERRIDE (IP): Performed by: FAMILY MEDICINE

## 2018-06-16 RX ORDER — SULFAMETHOXAZOLE AND TRIMETHOPRIM 800; 160 MG/1; MG/1
1 TABLET ORAL EVERY 12 HOURS
Status: DISCONTINUED | OUTPATIENT
Start: 2018-06-16 | End: 2018-06-17

## 2018-06-16 RX ORDER — SODIUM CHLORIDE 9 MG/ML
INJECTION, SOLUTION INTRAVENOUS
Status: COMPLETED
Start: 2018-06-16 | End: 2018-06-16

## 2018-06-16 RX ORDER — FAMOTIDINE 20 MG/1
20 TABLET, FILM COATED ORAL 2 TIMES DAILY
Status: DISCONTINUED | OUTPATIENT
Start: 2018-06-16 | End: 2018-06-19

## 2018-06-16 RX ADMIN — VANCOMYCIN HYDROCHLORIDE 1600 MG: 100 INJECTION, POWDER, LYOPHILIZED, FOR SOLUTION INTRAVENOUS at 05:36

## 2018-06-16 RX ADMIN — OXYCODONE HYDROCHLORIDE 5 MG: 5 TABLET ORAL at 09:38

## 2018-06-16 RX ADMIN — DOCUSATE SODIUM AND SENNOSIDES 2 TABLET: 8.6; 5 TABLET, FILM COATED ORAL at 21:24

## 2018-06-16 RX ADMIN — ACETAMINOPHEN 650 MG: 325 TABLET, FILM COATED ORAL at 18:59

## 2018-06-16 RX ADMIN — OXYCODONE HYDROCHLORIDE 5 MG: 5 TABLET ORAL at 21:25

## 2018-06-16 RX ADMIN — SODIUM CHLORIDE 500 ML: 9 INJECTION, SOLUTION INTRAVENOUS at 05:55

## 2018-06-16 RX ADMIN — OXYCODONE HYDROCHLORIDE AND ACETAMINOPHEN 500 MG: 500 TABLET ORAL at 09:38

## 2018-06-16 RX ADMIN — PIPERACILLIN AND TAZOBACTAM 4.5 G: 4; .5 INJECTION, POWDER, LYOPHILIZED, FOR SOLUTION INTRAVENOUS; PARENTERAL at 06:34

## 2018-06-16 RX ADMIN — ACETAMINOPHEN 650 MG: 325 TABLET, FILM COATED ORAL at 03:17

## 2018-06-16 RX ADMIN — SULFAMETHOXAZOLE AND TRIMETHOPRIM 1 TABLET: 800; 160 TABLET ORAL at 21:24

## 2018-06-16 RX ADMIN — PIPERACILLIN AND TAZOBACTAM 4.5 G: 4; .5 INJECTION, POWDER, LYOPHILIZED, FOR SOLUTION INTRAVENOUS; PARENTERAL at 21:25

## 2018-06-16 RX ADMIN — DOCUSATE SODIUM AND SENNOSIDES 2 TABLET: 8.6; 5 TABLET, FILM COATED ORAL at 09:40

## 2018-06-16 RX ADMIN — DIPHENHYDRAMINE HYDROCHLORIDE 25 MG: 50 INJECTION, SOLUTION INTRAMUSCULAR; INTRAVENOUS at 05:33

## 2018-06-16 RX ADMIN — ACETAMINOPHEN 650 MG: 325 TABLET, FILM COATED ORAL at 09:38

## 2018-06-16 RX ADMIN — ENOXAPARIN SODIUM 40 MG: 100 INJECTION SUBCUTANEOUS at 09:41

## 2018-06-16 RX ADMIN — OMEPRAZOLE 20 MG: 20 CAPSULE, DELAYED RELEASE ORAL at 09:39

## 2018-06-16 RX ADMIN — PIPERACILLIN AND TAZOBACTAM 4.5 G: 4; .5 INJECTION, POWDER, LYOPHILIZED, FOR SOLUTION INTRAVENOUS; PARENTERAL at 16:42

## 2018-06-16 RX ADMIN — THERA TABS 1 TABLET: TAB at 09:39

## 2018-06-16 ASSESSMENT — PAIN SCALES - GENERAL
PAINLEVEL_OUTOF10: 4
PAINLEVEL_OUTOF10: 6
PAINLEVEL_OUTOF10: 5
PAINLEVEL_OUTOF10: 6
PAINLEVEL_OUTOF10: 4

## 2018-06-16 ASSESSMENT — ENCOUNTER SYMPTOMS
SORE THROAT: 0
PALPITATIONS: 0
DIZZINESS: 0
TINGLING: 0
HEADACHES: 0
INSOMNIA: 0
EYE PAIN: 0
NECK PAIN: 0
NAUSEA: 0
DEPRESSION: 0
ABDOMINAL PAIN: 0
SHORTNESS OF BREATH: 0
FEVER: 0
VOMITING: 0
BLURRED VISION: 0
COUGH: 0
CHILLS: 0
BACK PAIN: 0

## 2018-06-16 NOTE — PROGRESS NOTES
[facility] Hospitalist Progress Note    Date of Service: 2018    Chief Complaint  38 y.o. female admitted 6/15/2018 with right breast pain. She a history of breast cancer s/p bilateral mastectomies with bilateral expanders placed. She has a further history of a left sided infection with pseudomonas and subsequent expander removal. She has failed oral bactrim as an outpatient and has been found to have cellulitis of the right breast area.     Interval Problem Update  Having flushing and chills.   Itching with vancomycin  Says redness is improving  Pain improving    Consultants/Specialty  plastics- dr siddiqui  ID- discussed with dr crowder today    [facility] right breast:  Postoperative changes right breast. Soft tissue edema consistent with inflammation.  No drainable abscess identified.    These results were given to the patient at the time of visit.          Review of Systems   Constitutional: Negative for chills and fever.   HENT: Negative for sore throat.    Eyes: Negative for blurred vision and pain.   Respiratory: Negative for cough and shortness of breath.    Cardiovascular: Positive for chest pain. Negative for palpitations.   Gastrointestinal: Negative for abdominal pain, nausea and vomiting.   Genitourinary: Negative for dysuria and urgency.   Musculoskeletal: Negative for back pain and neck pain.   Skin: Negative for itching and rash.   Neurological: Negative for dizziness, tingling and headaches.   Psychiatric/Behavioral: Negative for depression. The patient does not have insomnia.    All other systems reviewed and are negative.     Physical Exam  Laboratory/Imaging   Hemodynamics  Temp (24hrs), Av.2 °C (99 °F), Min:36.9 °C (98.5 °F), Max:37.4 °C (99.4 °F)   Temperature: 37.4 °C (99.3 °F)  Pulse  Av.1  Min: 65  Max: 84    Blood Pressure: 111/66, NIBP: 117/70      Respiratory      Respiration: 18, Pulse Oximetry: 96 %             Fluids    Intake/Output Summary (Last 24 hours) at  06/16/18 1121  Last data filed at 06/16/18 0800   Gross per 24 hour   Intake             2040 ml   Output                0 ml   Net             2040 ml       Nutrition  Orders Placed This Encounter   Procedures   • Diet Order     Standing Status:   Standing     Number of Occurrences:   1     Order Specific Question:   Diet:     Answer:   Regular [1]     Physical Exam   Constitutional: She is oriented to person, place, and time. She appears well-developed and well-nourished. No distress.   Patient seen and examined  Discussed plan with RN   CLOVERT:   Right Ear: External ear normal.   Left Ear: External ear normal.   Nose: Nose normal.   Eyes: Conjunctivae are normal. Right eye exhibits no discharge. Left eye exhibits no discharge.   Neck: No JVD present.   Cardiovascular: Regular rhythm and normal heart sounds.    No murmur heard.  Cap refill 2sec  Pulses 2+ throughout     Pulmonary/Chest: Effort normal and breath sounds normal. No stridor. No respiratory distress. She has no wheezes. She has no rales.   Abdominal: Soft. Bowel sounds are normal. She exhibits no distension. There is no tenderness.   Right breast with erythema extending over breast and into axilla. Tender.    Musculoskeletal: She exhibits no edema or tenderness.   Neurological: She is alert and oriented to person, place, and time.   Skin: Skin is warm and dry. She is not diaphoretic. No erythema.   Normal skin  Color.    Psychiatric: She has a normal mood and affect. Her behavior is normal.   Nursing note and vitals reviewed.      Recent Labs      06/15/18   1254  06/16/18   0522   WBC  6.3  5.9   RBC  3.94*  3.73*   HEMOGLOBIN  11.3*  10.4*   HEMATOCRIT  34.2*  32.8*   MCV  86.8  87.9   MCH  28.7  27.9   MCHC  33.0*  31.7*   RDW  44.4  46.1   PLATELETCT  335  349   MPV  8.8*  9.1     Recent Labs      06/15/18   1254  06/16/18   0523   SODIUM  135  138   POTASSIUM  4.2  3.9   CHLORIDE  102  106   CO2  23  24   GLUCOSE  99  99   BUN  11  10   CREATININE   0.74  0.88   CALCIUM  9.4  9.2     Recent Labs      06/15/18   1254   APTT  30.2   INR  1.14*                  Assessment/Plan     Cellulitis- (present on admission)   Assessment & Plan    Patient has cellulitis in her right breast with underlying expander still present  Has history of pseudomonas on the left s/p removal of expander  She has failed outpatient Bactrim  Likely need to remove the expander  Continue Broad-spectrum antibiotics, Zosyn and vancomycin  Infectious disease consulted        Breast cancer (HCC)- (present on admission)   Assessment & Plan    Patient had bilateral masectomy. She is BRCA positive  Radiation is planned        Morbid obesity (HCC)- (present on admission)   Assessment & Plan    Needs weight loss          Generalized anxiety disorder- (present on admission)   Assessment & Plan    As needed medications for anxiety          Quality-Core Measures   Reviewed items::  EKG reviewed, Labs reviewed, Medications reviewed and Radiology images reviewed  Casey catheter::  No Casey  DVT prophylaxis pharmacological::  Enoxaparin (Lovenox)  DVT prophylaxis - mechanical:  SCDs  Ulcer Prophylaxis::  Yes  Antibiotics:  Treating active infection/contamination beyond 24 hours perioperative coverage  Assessed for rehabilitation services:  Patient was assess for and/or received rehabilitation services during this hospitalization

## 2018-06-16 NOTE — PROGRESS NOTES
Assessment complete.  AA&Ox4. RA. Denies SOB.  Reporting 4/10 pain. Medicated per MAR.   Right breast red and warm to touch.  Tolerating regular diet. Denies N/V.  + void. LBM 6/15 PTA.   Pt is upself with steady gait.   Pt flushed and experiencing itchiness at beginning of shift. Vanco immediately stopped.  Hospitalist paged and orders received for PRN IV benadryl.  Vanco resumed after redness and itchiness resolved.  All needs met at this time. Call light within reach.

## 2018-06-16 NOTE — ASSESSMENT & PLAN NOTE
Patient underwent bilateral mastectomy after being diagnosed with left sided breast cancer last fall  She had neoadjuvant chemo, had prophylactic right mastectomy and bilateral tissue expanders placed.  She had prior L tissue expander removed due to Pseudomonas infection  Presented with infection in the right breast, right tissue expander has been removed  She is on empiric antibiotics and cultures are NGTD

## 2018-06-16 NOTE — CARE PLAN
Problem: Communication  Goal: The ability to communicate needs accurately and effectively will improve  Outcome: PROGRESSING AS EXPECTED  Education provided on importance of using call light to alert staff of patient needs. Pt demonstrates understanding by using call light appropriately.    Problem: Infection  Goal: Will remain free from infection  Outcome: PROGRESSING AS EXPECTED  IV abx in use.

## 2018-06-16 NOTE — PROGRESS NOTES
"Pharmacy Kinetics 38 y.o. female on vancomycin day # 2 2018    Currently on Vancomycin 1600 mg IV q12h (0500 1700)     Indication for Treatment: Right breast cellulitis      Pertinent history per medical record: Admitted on 6/15/2018 for right breast cellulitis. Recent mastectomy 2 months ago. Left breast infection one month ago with PSAR. Now with pain, redness, swelling in the right breast. ID consulted.     Other antibiotics: Zosyn 4.5 g IV q6h     Allergies: Patient has no known allergies.      List concerns for renal function: BMI 36     Pertinent cultures to date:   06/15/18: PBCx2:NGTD    Recent Labs      06/15/18   1254  18   0522   WBC  6.3  5.9   NEUTSPOLYS  68.60  61.90     Recent Labs      06/15/18   1254  18   0523   BUN  11  10   CREATININE  0.74  0.88   ALBUMIN  4.3   --      No results for input(s): VANCOTROUGH, VANCOPEAK, VANCORANDOM in the last 72 hours.  Intake/Output Summary (Last 24 hours) at 18 1016  Last data filed at 18 0800   Gross per 24 hour   Intake             2040 ml   Output                0 ml   Net             2040 ml      Blood pressure 111/66, pulse 65, temperature 37.4 °C (99.3 °F), resp. rate 18, height 1.626 m (5' 4\"), weight 94.4 kg (208 lb 1.8 oz), last menstrual period 2017, SpO2 96 %, not currently breastfeeding. Temp (24hrs), Av.1 °C (98.8 °F), Min:36.5 °C (97.7 °F), Max:37.4 °C (99.4 °F)      A/P        1. Vancomycin dose change: new start  2. Next vancomycin level:  at 0430   3. Goal trough: 12-16 mcg/mL  4. Comments: Patient with recent PSAR infection so Unasyn changed to Zosyn. No leukocytosis , afebrile over interval, cx NGTD. Continue current dose , level ordered.     Sharad Wei, PharmD, BCPS  "

## 2018-06-16 NOTE — H&P
Hospital Medicine History & Physical Note    Date of Service  6/15/2018    Primary Care Physician  Pcp Pt States None, oncologist Dr. Rogers, she sees radiation oncology at [place]    Consultants  Dr. Osuna, plastic surgery    Code Status  Full Code    Chief Complaint  Right breast pain    History of Presenting Illness  38 y.o. female who presented 6/15/2018 with right breast pain.    Patient has a history of left breast cancer, her mother had ovarian cancer and the patient has BRCA gene. She had bilateral mastectomies. Patient had bilateral expanders placed after surgery. Expander on the left side became infected, and area of the skin became necrotic, cultures were positive for Pseudomonas, the expander was removed. Over the past week or so the patient has had increased pain and swelling at the right side, the area on the lateral chest has become indurated and painful, chest discomfort and is her 1st infection and did see Dr. Brooke who prescribed Bactrim. Patient has been compliant with his medication but the area has been worsening. Skin to the restroom today for evaluation. Patient endorses malaise she is measured a temperature as high as 100.2 at home.    Review of Systems  Review of Systems   Constitutional: Positive for fever and malaise/fatigue. Negative for chills.   Respiratory: Negative for cough, hemoptysis and sputum production.    Cardiovascular: Negative for chest pain, palpitations, orthopnea and claudication.   Gastrointestinal: Negative for nausea and vomiting.   Skin: Negative for itching and rash.   Neurological: Negative for dizziness.   Psychiatric/Behavioral: The patient is nervous/anxious and has insomnia.    All other systems reviewed and are negative.      Past Medical History   has a past medical history of Anxiety; Cancer (HCC) (11/2017); Pain; and Psychiatric problem. She also has no past medical history of ASTHMA; Diabetes; Hyperlipidemia; or Hypertension.    Surgical History    has a past surgical history that includes open reduction; other (10/11/2017); other (04/2018); other (03/2018); other; and flap graft (Right, 5/16/2018).     Family History  family history includes Cancer (age of onset: 45) in her mother; Heart Disease in her father; Hyperlipidemia in her father; Hypertension in her father.     Social History   reports that she has quit smoking. Her smoking use included Cigarettes. She smoked 0.25 packs per day. She has never used smokeless tobacco. She reports that she drinks alcohol. She reports that she does not use drugs.    Allergies  No Known Allergies    Medications  No current facility-administered medications on file prior to encounter.      Current Outpatient Prescriptions on File Prior to Encounter   Medication Sig Dispense Refill   • multivitamin (THERAGRAN) Tab Take 1 Tab by mouth every day.     • vitamin D, Ergocalciferol, (DRISDOL) 00457 units Cap capsule Take 50,000 Units by mouth every 7 days.     • omeprazole (PRILOSEC) 20 MG delayed-release capsule Take 20 mg by mouth every day.     • ALPRAZolam (XANAX) 0.25 MG Tab Take 0.25 mg by mouth at bedtime as needed for Sleep.     • ascorbic acid (ASCORBIC ACID) 500 MG Tab Take 500 mg by mouth every day.     • sulfamethoxazole-trimethoprim (BACTRIM DS) 800-160 MG tablet Take 1 Tab by mouth 2 times a day.         Physical Exam  Blood Pressure: 107/64   Temperature: 36.9 °C (98.5 °F)   Pulse: 84   Respiration: 16   Pulse Oximetry: 96 %     Physical Exam   Constitutional: She is oriented to person, place, and time. She appears well-developed and well-nourished.   HENT:   Head: Normocephalic and atraumatic.   Eyes: Conjunctivae and EOM are normal. Right eye exhibits no discharge. Left eye exhibits no discharge. No scleral icterus.   Cardiovascular: Normal rate, regular rhythm and intact distal pulses.    No murmur heard.  2+ Radial Pulses  Brisk Capillary Refill   Pulmonary/Chest: Effort normal and breath sounds normal. No  respiratory distress. She has no wheezes.   Bilateral breasts are postsurgical  Right lateral breast is indurated, erythematous and warm   Abdominal: Soft. Bowel sounds are normal. She exhibits no distension. There is no tenderness. There is no rebound.   Musculoskeletal: Normal range of motion. She exhibits no edema.   Neurological: She is alert and oriented to person, place, and time. No cranial nerve deficit.   Skin: Skin is warm and dry. She is not diaphoretic. There is erythema.   Skin is warm and well perfused   Psychiatric: She has a normal mood and affect.       Laboratory:  Recent Labs      06/15/18   1254   WBC  6.3   RBC  3.94*   HEMOGLOBIN  11.3*   HEMATOCRIT  34.2*   MCV  86.8   MCH  28.7   MCHC  33.0*   RDW  44.4   PLATELETCT  335   MPV  8.8*     Recent Labs      06/15/18   1254   SODIUM  135   POTASSIUM  4.2   CHLORIDE  102   CO2  23   GLUCOSE  99   BUN  11   CREATININE  0.74   CALCIUM  9.4     Recent Labs      06/15/18   1254   ALTSGPT  15   ASTSGOT  17   ALKPHOSPHAT  78   TBILIRUBIN  0.3   GLUCOSE  99     Recent Labs      06/15/18   1254   APTT  30.2   INR  1.14*             No results found for: TROPONINI    Urinalysis:    No results found for: SPECGRAVITY, GLUCOSEUR, KETONES, NITRITE, WBCURINE, RBCURINE, BACTERIA, EPITHELCELL     Imaging:  US-BREAST LIMITED-RIGHT   Final Result      Postoperative changes right breast. Soft tissue edema consistent with inflammation.   No drainable abscess identified.      These results were given to the patient at the time of visit.               Assessment/Plan:  I anticipate this patient will require at least two midnights for appropriate medical management, necessitating inpatient admission.    Cellulitis- (present on admission)   Assessment & Plan    Patient has cellulitis in her right breast with underlying expander  Has history of pseudomonas on the left  She failed outpatient Bactrim  Discussed with her plastic surgeon, patient is undecided about whether to  remove the expander  Broad-spectrum antibiotics, Zosyn and vancomycin  Vancomycin will be titrated to serum concentration levels to ensure adequate levels and reduce risk of toxicity  Infectious disease consult        Breast cancer (HCC)- (present on admission)   Assessment & Plan    Patient had bilateral masectomy. She is BRCA positive  Radiation is planned        Generalized anxiety disorder- (present on admission)   Assessment & Plan    As needed medications for anxiety            VTE prophylaxis: Lovenox

## 2018-06-16 NOTE — CARE PLAN
Problem: Safety  Goal: Will remain free from falls  Outcome: PROGRESSING AS EXPECTED  Discussed fall education upon arrival to unit, pt verbalized understanding.     Problem: Knowledge Deficit  Goal: Knowledge of disease process/condition, treatment plan, diagnostic tests, and medications will improve  Outcome: PROGRESSING AS EXPECTED  Discussed POC upon arrival to unit.

## 2018-06-17 LAB
ALBUMIN SERPL BCP-MCNC: 3.5 G/DL (ref 3.2–4.9)
ALBUMIN/GLOB SERPL: 1 G/DL
ALP SERPL-CCNC: 59 U/L (ref 30–99)
ALT SERPL-CCNC: 10 U/L (ref 2–50)
ANION GAP SERPL CALC-SCNC: 9 MMOL/L (ref 0–11.9)
AST SERPL-CCNC: 11 U/L (ref 12–45)
BASOPHILS # BLD AUTO: 0.4 % (ref 0–1.8)
BASOPHILS # BLD: 0.02 K/UL (ref 0–0.12)
BILIRUB SERPL-MCNC: 0.2 MG/DL (ref 0.1–1.5)
BUN SERPL-MCNC: 8 MG/DL (ref 8–22)
CALCIUM SERPL-MCNC: 8.9 MG/DL (ref 8.5–10.5)
CHLORIDE SERPL-SCNC: 108 MMOL/L (ref 96–112)
CO2 SERPL-SCNC: 23 MMOL/L (ref 20–33)
CREAT SERPL-MCNC: 0.78 MG/DL (ref 0.5–1.4)
EOSINOPHIL # BLD AUTO: 0.22 K/UL (ref 0–0.51)
EOSINOPHIL NFR BLD: 4.3 % (ref 0–6.9)
ERYTHROCYTE [DISTWIDTH] IN BLOOD BY AUTOMATED COUNT: 46.2 FL (ref 35.9–50)
GLOBULIN SER CALC-MCNC: 3.6 G/DL (ref 1.9–3.5)
GLUCOSE SERPL-MCNC: 91 MG/DL (ref 65–99)
HCT VFR BLD AUTO: 31.9 % (ref 37–47)
HGB BLD-MCNC: 10.1 G/DL (ref 12–16)
IMM GRANULOCYTES # BLD AUTO: 0.02 K/UL (ref 0–0.11)
IMM GRANULOCYTES NFR BLD AUTO: 0.4 % (ref 0–0.9)
LYMPHOCYTES # BLD AUTO: 1.67 K/UL (ref 1–4.8)
LYMPHOCYTES NFR BLD: 32.8 % (ref 22–41)
MCH RBC QN AUTO: 28.3 PG (ref 27–33)
MCHC RBC AUTO-ENTMCNC: 31.7 G/DL (ref 33.6–35)
MCV RBC AUTO: 89.4 FL (ref 81.4–97.8)
MONOCYTES # BLD AUTO: 0.56 K/UL (ref 0–0.85)
MONOCYTES NFR BLD AUTO: 11 % (ref 0–13.4)
NEUTROPHILS # BLD AUTO: 2.6 K/UL (ref 2–7.15)
NEUTROPHILS NFR BLD: 51.1 % (ref 44–72)
NRBC # BLD AUTO: 0 K/UL
NRBC BLD-RTO: 0 /100 WBC
PLATELET # BLD AUTO: 328 K/UL (ref 164–446)
PMV BLD AUTO: 8.7 FL (ref 9–12.9)
POTASSIUM SERPL-SCNC: 4.1 MMOL/L (ref 3.6–5.5)
PROT SERPL-MCNC: 7.1 G/DL (ref 6–8.2)
RBC # BLD AUTO: 3.57 M/UL (ref 4.2–5.4)
SODIUM SERPL-SCNC: 140 MMOL/L (ref 135–145)
VANCOMYCIN TROUGH SERPL-MCNC: 9.2 UG/ML (ref 10–20)
WBC # BLD AUTO: 5.1 K/UL (ref 4.8–10.8)

## 2018-06-17 PROCEDURE — 700105 HCHG RX REV CODE 258: Performed by: HOSPITALIST

## 2018-06-17 PROCEDURE — 700102 HCHG RX REV CODE 250 W/ 637 OVERRIDE(OP): Performed by: HOSPITALIST

## 2018-06-17 PROCEDURE — 80202 ASSAY OF VANCOMYCIN: CPT

## 2018-06-17 PROCEDURE — A9270 NON-COVERED ITEM OR SERVICE: HCPCS | Performed by: HOSPITALIST

## 2018-06-17 PROCEDURE — 99233 SBSQ HOSP IP/OBS HIGH 50: CPT | Performed by: INTERNAL MEDICINE

## 2018-06-17 PROCEDURE — A9270 NON-COVERED ITEM OR SERVICE: HCPCS | Performed by: INTERNAL MEDICINE

## 2018-06-17 PROCEDURE — 700102 HCHG RX REV CODE 250 W/ 637 OVERRIDE(OP): Performed by: INTERNAL MEDICINE

## 2018-06-17 PROCEDURE — 36415 COLL VENOUS BLD VENIPUNCTURE: CPT

## 2018-06-17 PROCEDURE — 700111 HCHG RX REV CODE 636 W/ 250 OVERRIDE (IP): Performed by: HOSPITALIST

## 2018-06-17 PROCEDURE — 85025 COMPLETE CBC W/AUTO DIFF WBC: CPT

## 2018-06-17 PROCEDURE — 80053 COMPREHEN METABOLIC PANEL: CPT

## 2018-06-17 PROCEDURE — 99233 SBSQ HOSP IP/OBS HIGH 50: CPT | Performed by: HOSPITALIST

## 2018-06-17 PROCEDURE — 770006 HCHG ROOM/CARE - MED/SURG/GYN SEMI*

## 2018-06-17 RX ORDER — LINEZOLID 600 MG/1
600 TABLET, FILM COATED ORAL EVERY 12 HOURS
Status: DISCONTINUED | OUTPATIENT
Start: 2018-06-17 | End: 2018-06-17

## 2018-06-17 RX ADMIN — ACETAMINOPHEN 650 MG: 325 TABLET, FILM COATED ORAL at 09:57

## 2018-06-17 RX ADMIN — OXYCODONE HYDROCHLORIDE 5 MG: 5 TABLET ORAL at 23:48

## 2018-06-17 RX ADMIN — THERA TABS 1 TABLET: TAB at 08:43

## 2018-06-17 RX ADMIN — OXYCODONE HYDROCHLORIDE 5 MG: 5 TABLET ORAL at 20:04

## 2018-06-17 RX ADMIN — PIPERACILLIN AND TAZOBACTAM 4.5 G: 4; .5 INJECTION, POWDER, LYOPHILIZED, FOR SOLUTION INTRAVENOUS; PARENTERAL at 16:02

## 2018-06-17 RX ADMIN — DOCUSATE SODIUM AND SENNOSIDES 2 TABLET: 8.6; 5 TABLET, FILM COATED ORAL at 08:43

## 2018-06-17 RX ADMIN — OXYCODONE HYDROCHLORIDE AND ACETAMINOPHEN 500 MG: 500 TABLET ORAL at 08:42

## 2018-06-17 RX ADMIN — ACETAMINOPHEN 650 MG: 325 TABLET, FILM COATED ORAL at 16:01

## 2018-06-17 RX ADMIN — LINEZOLID 600 MG: 600 TABLET, FILM COATED ORAL at 16:02

## 2018-06-17 RX ADMIN — ACETAMINOPHEN 650 MG: 325 TABLET, FILM COATED ORAL at 01:06

## 2018-06-17 RX ADMIN — PIPERACILLIN AND TAZOBACTAM 4.5 G: 4; .5 INJECTION, POWDER, LYOPHILIZED, FOR SOLUTION INTRAVENOUS; PARENTERAL at 23:48

## 2018-06-17 RX ADMIN — DOCUSATE SODIUM AND SENNOSIDES 2 TABLET: 8.6; 5 TABLET, FILM COATED ORAL at 20:04

## 2018-06-17 RX ADMIN — OMEPRAZOLE 20 MG: 20 CAPSULE, DELAYED RELEASE ORAL at 08:43

## 2018-06-17 RX ADMIN — SULFAMETHOXAZOLE AND TRIMETHOPRIM 1 TABLET: 800; 160 TABLET ORAL at 08:43

## 2018-06-17 RX ADMIN — PIPERACILLIN AND TAZOBACTAM 4.5 G: 4; .5 INJECTION, POWDER, LYOPHILIZED, FOR SOLUTION INTRAVENOUS; PARENTERAL at 06:49

## 2018-06-17 RX ADMIN — ENOXAPARIN SODIUM 40 MG: 100 INJECTION SUBCUTANEOUS at 08:42

## 2018-06-17 RX ADMIN — ACETAMINOPHEN 650 MG: 325 TABLET, FILM COATED ORAL at 23:48

## 2018-06-17 ASSESSMENT — ENCOUNTER SYMPTOMS
MYALGIAS: 1
BACK PAIN: 0
NAUSEA: 0
VOMITING: 0
ABDOMINAL PAIN: 0
SPEECH CHANGE: 0
INSOMNIA: 0
BLURRED VISION: 0
FEVER: 0
TINGLING: 0
COUGH: 0
CHILLS: 0
DEPRESSION: 0
SORE THROAT: 0
EYE PAIN: 0
NECK PAIN: 0
SHORTNESS OF BREATH: 0
SENSORY CHANGE: 0
PALPITATIONS: 0
DIZZINESS: 0

## 2018-06-17 ASSESSMENT — PAIN SCALES - GENERAL
PAINLEVEL_OUTOF10: 2
PAINLEVEL_OUTOF10: 5
PAINLEVEL_OUTOF10: 2
PAINLEVEL_OUTOF10: 5
PAINLEVEL_OUTOF10: 4
PAINLEVEL_OUTOF10: 5
PAINLEVEL_OUTOF10: 5
PAINLEVEL_OUTOF10: 4
PAINLEVEL_OUTOF10: 4

## 2018-06-17 NOTE — PROGRESS NOTES
Received bedside report and assumed care at 1900.    Pt is A&O x4.  Pain 6/10, medicated per MAR.  Denies nausea  Tolerating regular diet  Redness to right breast  + Urine output  + BM   + Flatus  Up Self   Bed in lowest position and locked.  Pt resting comfortably now.  Review plan of care with patient  Call light within reach  Hourly rounds in place  All needs met at this time

## 2018-06-17 NOTE — PROGRESS NOTES
Assumed care of patient from RN at 0700.      Reviewed VS, labs, orders, and notes.      Pt sitting up in bed. A&Ox 4. VSS     On room air. Denies SOB.     Moves all extremities, denies numbness or tingling.      Skin assessed over bony prominences and under medical devices.     Voiding, hypoactive BS X 4, + flatus, LBM 6/15 PTA.      Regular diet, tolerating well, denies nausea/ vomiting.      Wound(s): bilateral breast mastectomy in April 2018, Left breast surgery in May 2018, healing incision.      Patient reports 4 /10 pain in right breast and under armpit.     Pt. is up independently with steady gait.     Fall prevention education reinforced with pt. Pt is wearing treaded slipper socks, IV pole is on the same side as the bathroom, lower bedrails are down. Pt calls appropriatly.      Discussed POC, all questions answered at this time. Bed is locked and in the lowest position, call light within reach, Q 1 hour rounding in place. All needs met at this time.

## 2018-06-17 NOTE — PROGRESS NOTES
Infectious Disease Progress Note    Author: Angela Francisco M.D. Date & Time of service: 2018  11:15 AM    Chief Complaint:  Breast cellulitis    Interval History:  2018 MAXIMUM TEMPERATURE 99.6 WBC 5.1 platelets 328 creatinine 0.78  Labs Reviewed, Medications Reviewed, Radiology Reviewed and Wound Reviewed.    Review of Systems:  Review of Systems   Constitutional: Positive for malaise/fatigue. Negative for fever.   HENT: Negative for hearing loss.    Respiratory: Negative for cough.    Cardiovascular: Negative for chest pain.   Gastrointestinal: Negative for abdominal pain, nausea and vomiting.   Genitourinary: Negative for dysuria.   Musculoskeletal: Positive for myalgias.        Right breast still hurts   Neurological: Negative for sensory change and speech change.       Hemodynamics:  Temp (24hrs), Av.2 °C (98.9 °F), Min:37 °C (98.6 °F), Max:37.6 °C (99.6 °F)  Temperature: 37.1 °C (98.8 °F)  Pulse  Av.2  Min: 65  Max: 84   Blood Pressure: 113/73       Physical Exam:  Physical Exam   Constitutional: She is oriented to person, place, and time. No distress.   HENT:   Mouth/Throat: No oropharyngeal exudate.   Eyes: No scleral icterus.   Neck: Neck supple.   Cardiovascular: Regular rhythm.    No murmur heard.  Pulmonary/Chest: She has no wheezes. She has no rales.   Left breast has scarring but no erythema  rt breast has erythema and implant in place. There is swelling which is extending to the right axilla   Abdominal: Soft. There is no tenderness.   Neurological: She is alert and oriented to person, place, and time. No cranial nerve deficit. Coordination normal.   Skin: There is erythema.   Vitals reviewed.      Meds:    Current Facility-Administered Medications:   •  famotidine  •  sulfamethoxazole-trimethoprim  •  senna-docusate **AND** polyethylene glycol/lytes **AND** magnesium hydroxide **AND** bisacodyl  •  ondansetron  •  ondansetron  •  promethazine  •  promethazine  •   prochlorperazine  •  acetaminophen  •  Notify provider if pain remains uncontrolled **AND** Use the numeric rating scale (NRS-11) on regular floors and Critical-Care Pain Observation Tool (CPOT) on ICUs/Trauma to assess pain **AND** Pulse Ox (Oximetry) **AND** Pharmacy Consult Request **AND** If patient difficult to arouse and/or has respiratory depression, stop any opiates that are currently infusing and call a Rapid Response. **AND** oxyCODONE immediate-release **AND** oxyCODONE immediate-release **AND** morphine injection  •  [COMPLETED] piperacillin-tazobactam **AND** piperacillin-tazobactam  •  diphenhydrAMINE  •  ALPRAZolam  •  ascorbic acid  •  multivitamin  •  omeprazole  •  enoxaparin (LOVENOX) injection    Labs:  Recent Labs      06/15/18   1254  06/16/18   0522 06/17/18   0431   WBC  6.3  5.9  5.1   RBC  3.94*  3.73*  3.57*   HEMOGLOBIN  11.3*  10.4*  10.1*   HEMATOCRIT  34.2*  32.8*  31.9*   MCV  86.8  87.9  89.4   MCH  28.7  27.9  28.3   RDW  44.4  46.1  46.2   PLATELETCT  335  349  328   MPV  8.8*  9.1  8.7*   NEUTSPOLYS  68.60  61.90  51.10   LYMPHOCYTES  20.40*  26.10  32.80   MONOCYTES  7.90  8.00  11.00   EOSINOPHILS  2.10  3.40  4.30   BASOPHILS  0.50  0.30  0.40     Recent Labs      06/15/18   1254  06/16/18   0523  06/17/18   0431   SODIUM  135  138  140   POTASSIUM  4.2  3.9  4.1   CHLORIDE  102  106  108   CO2  23  24  23   GLUCOSE  99  99  91   BUN  11  10  8     Recent Labs      06/15/18   1254  06/16/18   0523  06/17/18   0431   ALBUMIN  4.3   --   3.5   TBILIRUBIN  0.3   --   0.2   ALKPHOSPHAT  78   --   59   TOTPROTEIN  8.5*   --   7.1   ALTSGPT  15   --   10   ASTSGOT  17   --   11*   CREATININE  0.74  0.88  0.78       Imaging:  Us-breast Limited-right    Result Date: 6/15/2018  6/15/2018 1:31 PM HISTORY/REASON FOR EXAM: Status post mastectomy reconstruction and breast expander. Inflammation. Possible cellulitis. Possible abscess. TECHNIQUE/EXAM DESCRIPTION AND NUMBER OF VIEWS: Right  "breast ultrasound. COMPARISON:   None FINDINGS:      Postoperative changes involving the right breast. Artifact consistent with expander. Soft tissue edema is identified consistent with inflammation possibly cellulitis. No organized fluid collection consistent with abscess identified.     Postoperative changes right breast. Soft tissue edema consistent with inflammation. No drainable abscess identified. These results were given to the patient at the time of visit.       Micro:  Results     Procedure Component Value Units Date/Time    BLOOD CULTURE [[id number]] Collected:  06/15/18 1315    Order Status:  Completed Specimen:  Blood from Peripheral Updated:  06/16/18 0739     Significant Indicator NEG     Source BLD     Site PERIPHERAL     Blood Culture No Growth    Note: Blood cultures are incubated for 5 days and  are monitored continuously.Positive blood cultures  are called to the RN and reported as soon as  they are identified.      Narrative:       2 of 2 blood culture x2  Sites order. Per Hospital Policy:  Only change Specimen Src: to \"Line\" if specified by physician  order.    BLOOD CULTURE [[id number]] Collected:  06/15/18 1254    Order Status:  Completed Specimen:  Blood from Peripheral Updated:  06/16/18 0739     Significant Indicator NEG     Source BLD     Site PERIPHERAL     Blood Culture No Growth    Note: Blood cultures are incubated for 5 days and  are monitored continuously.Positive blood cultures  are called to the RN and reported as soon as  they are identified.      Narrative:       1 of 2 for Blood Culture x 2 sites order. Per Hospital  Policy: Only change Specimen Src: to \"Line\" if specified by  physician order.          Assessment:  Active Hospital Problems    Diagnosis   • Cellulitis [L03.90]   • Breast cancer (HCC) [C50.919]   • Generalized anxiety disorder [F41.1]   • Morbid obesity ([facility]) [E66.01]       Plan:  Right breast infection-ongoing work  Patient is status post mastectomy and implant in " place  The ultrasound done on 6/15/2018 did not show any abscess  Likely the implant is infected  Continue Zosyn and change Bactrim to Zyvox since it remains erythematous and patient was previously exposed to Bactrim  Consider removal of the implant    Left breast implant infection  Status post removal of the implant on 5/16/2018  Cultures were Pseudomonas    Breast CA  Patient is for radiation  Discussed with internal medicine.

## 2018-06-17 NOTE — CARE PLAN
Problem: Venous Thromboembolism (VTW)/Deep Vein Thrombosis (DVT) Prevention:  Goal: Patient will participate in Venous Thrombosis (VTE)/Deep Vein Thrombosis (DVT)Prevention Measures  Outcome: PROGRESSING AS EXPECTED  Lovenox ordered and administered per MAR.     Problem: Pain Management  Goal: Pain level will decrease to patient's comfort goal  Outcome: PROGRESSING AS EXPECTED  Consistent pain scale use; re-assessed with in 2 hours of administration.

## 2018-06-17 NOTE — CARE PLAN
Problem: Safety  Goal: Will remain free from injury  Outcome: PROGRESSING AS EXPECTED  Pt calls appropriately. Safety interventions in place.    Problem: Knowledge Deficit  Goal: Knowledge of the prescribed therapeutic regimen will improve  Outcome: PROGRESSING AS EXPECTED  Discussed medications. Pt had questions about current ABX regiment. All questions answered at this time.

## 2018-06-17 NOTE — PROGRESS NOTES
[facility] Hospitalist Progress Note    Date of Service: 2018    Chief Complaint  38 y.o. female admitted 6/15/2018 with right breast pain. She a history of breast cancer s/p bilateral mastectomies with bilateral expanders placed. She has a further history of a left sided infection with pseudomonas and subsequent expander removal. She has failed oral bactrim as an outpatient and has been found to have cellulitis of the right breast area.     Interval Problem Update  Did well over night.   No events. No further itching  Breast pain still present 4/10  Does not feel much improved today.     Consultants/Specialty  plastics- dr siddiqui  ID-     [facility] right breast:  Postoperative changes right breast. Soft tissue edema consistent with inflammation.  No drainable abscess identified.    These results were given to the patient at the time of visit.          Review of Systems   Constitutional: Negative for chills and fever.   HENT: Negative for sore throat.    Eyes: Negative for blurred vision and pain.   Respiratory: Negative for cough and shortness of breath.    Cardiovascular: Positive for chest pain. Negative for palpitations.   Gastrointestinal: Negative for abdominal pain and nausea.   Genitourinary: Negative for dysuria and urgency.   Musculoskeletal: Negative for back pain and neck pain.   Skin: Negative for itching and rash.   Neurological: Negative for dizziness and tingling.   Psychiatric/Behavioral: Negative for depression. The patient does not have insomnia.    All other systems reviewed and are negative.     Physical Exam  Laboratory/Imaging   Hemodynamics  Temp (24hrs), Av.2 °C (98.9 °F), Min:37 °C (98.6 °F), Max:37.6 °C (99.6 °F)   Temperature: 37.1 °C (98.8 °F)  Pulse  Av.2  Min: 65  Max: 84    Blood Pressure: 113/73      Respiratory      Respiration: 18, Pulse Oximetry: 96 %             Fluids    Intake/Output Summary (Last 24 hours) at 18 1043  Last data filed at  06/17/18 1000   Gross per 24 hour   Intake             2050 ml   Output                0 ml   Net             2050 ml       Nutrition  Orders Placed This Encounter   Procedures   • Diet Order     Standing Status:   Standing     Number of Occurrences:   1     Order Specific Question:   Diet:     Answer:   Regular [1]     Physical Exam   Constitutional: She is oriented to person, place, and time. She appears well-developed and well-nourished. No distress.   Patient seen and examined  Discussed plan with LIONEL   HENT:   Right Ear: External ear normal.   Left Ear: External ear normal.   Nose: Nose normal.   Eyes: Conjunctivae are normal. Right eye exhibits no discharge. Left eye exhibits no discharge.   Neck: No JVD present.   Cardiovascular: Regular rhythm and normal heart sounds.    No murmur heard.       Pulmonary/Chest: Effort normal and breath sounds normal. No stridor. No respiratory distress. She has no wheezes. She has no rales.   Abdominal: Soft. Bowel sounds are normal. She exhibits no distension. There is no tenderness.   Right breast with erythema extending over breast and into axilla. Tender. Not improved    Musculoskeletal: She exhibits no edema or tenderness.   Neurological: She is alert and oriented to person, place, and time.   Skin: Skin is warm and dry. She is not diaphoretic. No erythema.       Psychiatric: She has a normal mood and affect. Her behavior is normal.   Nursing note and vitals reviewed.      Recent Labs      06/15/18   1254  06/16/18   0522  06/17/18   0431   WBC  6.3  5.9  5.1   RBC  3.94*  3.73*  3.57*   HEMOGLOBIN  11.3*  10.4*  10.1*   HEMATOCRIT  34.2*  32.8*  31.9*   MCV  86.8  87.9  89.4   MCH  28.7  27.9  28.3   MCHC  33.0*  31.7*  31.7*   RDW  44.4  46.1  46.2   PLATELETCT  335  349  328   MPV  8.8*  9.1  8.7*     Recent Labs      06/15/18   1254  06/16/18   0523  06/17/18   0431   SODIUM  135  138  140   POTASSIUM  4.2  3.9  4.1   CHLORIDE  102  106  108   CO2  23  24  23    GLUCOSE  99  99  91   BUN  11  10  8   CREATININE  0.74  0.88  0.78   CALCIUM  9.4  9.2  8.9     Recent Labs      06/15/18   1254   APTT  30.2   INR  1.14*                  Assessment/Plan     Cellulitis- (present on admission)   Assessment & Plan    Patient has cellulitis in her right breast with underlying expander still present  Has history of pseudomonas on the left s/p removal of expander  She has failed outpatient Bactrim  Likely need to remove the expander  Continue Broad-spectrum antibiotics  Infectious disease and surgery following        Breast cancer (HCC)- (present on admission)   Assessment & Plan    Patient had bilateral masectomy. She is BRCA positive  Radiation is planned on the left for this week.   Followed by dr hay  Contacted oncology today and discussed . No change in treatment plan. Will delay radiation         Morbid obesity (HCC)- (present on admission)   Assessment & Plan    Needs weight loss          Generalized anxiety disorder- (present on admission)   Assessment & Plan    As needed medications for anxiety          Quality-Core Measures   Reviewed items::  EKG reviewed, Labs reviewed, Medications reviewed and Radiology images reviewed  Casey catheter::  No Casey  DVT prophylaxis pharmacological::  Enoxaparin (Lovenox)  DVT prophylaxis - mechanical:  SCDs  Ulcer Prophylaxis::  Yes  Antibiotics:  Treating active infection/contamination beyond 24 hours perioperative coverage  Assessed for rehabilitation services:  Patient was assess for and/or received rehabilitation services during this hospitalization

## 2018-06-17 NOTE — PROGRESS NOTES
Assumed care of patient from RN at 0700.     Reviewed VS, labs, orders, and notes.     Pt sitting up in bed. A&Ox 4. VSS    On room air. Denies SOB.    Moves all extremities, denies numbness or tingling.     Experiencing increased itching, Dr. Garcia updated.      Skin assessed over bony prominences and under medical devices.    Voiding, normoactive BS X 4, + flatus, LBM 6/16 PTA.     Regular diet, tolerating well, denies nausea/ vomiting.     Wound(s): bilateral breast mastectomy in April 2018, Left breast surgery in May 2018, healing incision.     Patient reports 4 /10 pain in right breast and underer armpit.    Pt. is up independently with steady gait.    Fall prevention education reinforced with pt. Pt is wearing treaded slipper socks, IV pole is on the same side as the bathroom, lower bedrails are down. Pt calls appropriatly.     Discussed POC, all questions answered at this time. Bed is locked and in the lowest position, call light within reach, Q 1 hour rounding in place. All needs met at this time.

## 2018-06-17 NOTE — CARE PLAN
Problem: Venous Thromboembolism (VTW)/Deep Vein Thrombosis (DVT) Prevention:  Goal: Patient will participate in Venous Thrombosis (VTE)/Deep Vein Thrombosis (DVT)Prevention Measures  Outcome: PROGRESSING AS EXPECTED  Lovenox ordered and administered     Problem: Pain Management  Goal: Pain level will decrease to patient's comfort goal  Outcome: PROGRESSING AS EXPECTED  Consistent pain scale used.

## 2018-06-18 LAB
ANION GAP SERPL CALC-SCNC: 10 MMOL/L (ref 0–11.9)
BACTERIA BLD CULT: ABNORMAL
BACTERIA BLD CULT: ABNORMAL
BASOPHILS # BLD AUTO: 0.5 % (ref 0–1.8)
BASOPHILS # BLD: 0.03 K/UL (ref 0–0.12)
BUN SERPL-MCNC: 10 MG/DL (ref 8–22)
CALCIUM SERPL-MCNC: 9.3 MG/DL (ref 8.5–10.5)
CHLORIDE SERPL-SCNC: 104 MMOL/L (ref 96–112)
CO2 SERPL-SCNC: 23 MMOL/L (ref 20–33)
CREAT SERPL-MCNC: 0.77 MG/DL (ref 0.5–1.4)
EOSINOPHIL # BLD AUTO: 0.25 K/UL (ref 0–0.51)
EOSINOPHIL NFR BLD: 4.2 % (ref 0–6.9)
ERYTHROCYTE [DISTWIDTH] IN BLOOD BY AUTOMATED COUNT: 45.5 FL (ref 35.9–50)
GLUCOSE SERPL-MCNC: 112 MG/DL (ref 65–99)
HCG SERPL QL: NEGATIVE
HCG UR QL: NEGATIVE
HCT VFR BLD AUTO: 31.7 % (ref 37–47)
HGB BLD-MCNC: 10.2 G/DL (ref 12–16)
IMM GRANULOCYTES # BLD AUTO: 0.04 K/UL (ref 0–0.11)
IMM GRANULOCYTES NFR BLD AUTO: 0.7 % (ref 0–0.9)
LYMPHOCYTES # BLD AUTO: 1.67 K/UL (ref 1–4.8)
LYMPHOCYTES NFR BLD: 28 % (ref 22–41)
MCH RBC QN AUTO: 28.5 PG (ref 27–33)
MCHC RBC AUTO-ENTMCNC: 32.2 G/DL (ref 33.6–35)
MCV RBC AUTO: 88.5 FL (ref 81.4–97.8)
MONOCYTES # BLD AUTO: 0.52 K/UL (ref 0–0.85)
MONOCYTES NFR BLD AUTO: 8.7 % (ref 0–13.4)
NEUTROPHILS # BLD AUTO: 3.46 K/UL (ref 2–7.15)
NEUTROPHILS NFR BLD: 57.9 % (ref 44–72)
NRBC # BLD AUTO: 0 K/UL
NRBC BLD-RTO: 0 /100 WBC
PLATELET # BLD AUTO: 358 K/UL (ref 164–446)
PMV BLD AUTO: 8.9 FL (ref 9–12.9)
POTASSIUM SERPL-SCNC: 3.9 MMOL/L (ref 3.6–5.5)
RBC # BLD AUTO: 3.58 M/UL (ref 4.2–5.4)
SIGNIFICANT IND 70042: ABNORMAL
SITE SITE: ABNORMAL
SODIUM SERPL-SCNC: 137 MMOL/L (ref 135–145)
SOURCE SOURCE: ABNORMAL
SP GR UR REFRACTOMETRY: 1.01
WBC # BLD AUTO: 6 K/UL (ref 4.8–10.8)

## 2018-06-18 PROCEDURE — 0H9T00Z DRAINAGE OF RIGHT BREAST WITH DRAINAGE DEVICE, OPEN APPROACH: ICD-10-PCS | Performed by: SURGERY

## 2018-06-18 PROCEDURE — 700105 HCHG RX REV CODE 258: Performed by: HOSPITALIST

## 2018-06-18 PROCEDURE — 160039 HCHG SURGERY MINUTES - EA ADDL 1 MIN LEVEL 3: Performed by: SURGERY

## 2018-06-18 PROCEDURE — 87186 SC STD MICRODIL/AGAR DIL: CPT | Mod: 91

## 2018-06-18 PROCEDURE — 81025 URINE PREGNANCY TEST: CPT

## 2018-06-18 PROCEDURE — 87040 BLOOD CULTURE FOR BACTERIA: CPT

## 2018-06-18 PROCEDURE — A6403 STERILE GAUZE>16 <= 48 SQ IN: HCPCS | Performed by: SURGERY

## 2018-06-18 PROCEDURE — 500698 HCHG HEMOCLIP, MEDIUM: Performed by: SURGERY

## 2018-06-18 PROCEDURE — A9270 NON-COVERED ITEM OR SERVICE: HCPCS | Performed by: HOSPITALIST

## 2018-06-18 PROCEDURE — 99233 SBSQ HOSP IP/OBS HIGH 50: CPT | Performed by: INTERNAL MEDICINE

## 2018-06-18 PROCEDURE — 500376 HCHG DRAIN, J-P ROUND 15FR: Performed by: SURGERY

## 2018-06-18 PROCEDURE — 500700 HCHG HEMOCLIP, SMALL (RED): Performed by: SURGERY

## 2018-06-18 PROCEDURE — 500389 HCHG DRAIN, RESERVOIR SUCT JP 100CC: Performed by: SURGERY

## 2018-06-18 PROCEDURE — 160028 HCHG SURGERY MINUTES - 1ST 30 MINS LEVEL 3: Performed by: SURGERY

## 2018-06-18 PROCEDURE — 160002 HCHG RECOVERY MINUTES (STAT): Performed by: SURGERY

## 2018-06-18 PROCEDURE — 700102 HCHG RX REV CODE 250 W/ 637 OVERRIDE(OP): Performed by: HOSPITALIST

## 2018-06-18 PROCEDURE — 36415 COLL VENOUS BLD VENIPUNCTURE: CPT

## 2018-06-18 PROCEDURE — 87077 CULTURE AEROBIC IDENTIFY: CPT | Mod: 91

## 2018-06-18 PROCEDURE — 87205 SMEAR GRAM STAIN: CPT | Mod: 91

## 2018-06-18 PROCEDURE — 87015 SPECIMEN INFECT AGNT CONCNTJ: CPT

## 2018-06-18 PROCEDURE — 84703 CHORIONIC GONADOTROPIN ASSAY: CPT

## 2018-06-18 PROCEDURE — 80048 BASIC METABOLIC PNL TOTAL CA: CPT

## 2018-06-18 PROCEDURE — 85025 COMPLETE CBC W/AUTO DIFF WBC: CPT

## 2018-06-18 PROCEDURE — 87070 CULTURE OTHR SPECIMN AEROBIC: CPT | Mod: 91

## 2018-06-18 PROCEDURE — 87075 CULTR BACTERIA EXCEPT BLOOD: CPT | Mod: 91

## 2018-06-18 PROCEDURE — 500054 HCHG BANDAGE, ELASTIC 6: Performed by: SURGERY

## 2018-06-18 PROCEDURE — 0HPT0JZ REMOVAL OF SYNTHETIC SUBSTITUTE FROM RIGHT BREAST, OPEN APPROACH: ICD-10-PCS | Performed by: SURGERY

## 2018-06-18 PROCEDURE — 700111 HCHG RX REV CODE 636 W/ 250 OVERRIDE (IP)

## 2018-06-18 PROCEDURE — 160009 HCHG ANES TIME/MIN: Performed by: SURGERY

## 2018-06-18 PROCEDURE — 99233 SBSQ HOSP IP/OBS HIGH 50: CPT | Performed by: HOSPITALIST

## 2018-06-18 PROCEDURE — 160035 HCHG PACU - 1ST 60 MINS PHASE I: Performed by: SURGERY

## 2018-06-18 PROCEDURE — 700111 HCHG RX REV CODE 636 W/ 250 OVERRIDE (IP): Performed by: HOSPITALIST

## 2018-06-18 PROCEDURE — 700101 HCHG RX REV CODE 250

## 2018-06-18 PROCEDURE — 160048 HCHG OR STATISTICAL LEVEL 1-5: Performed by: SURGERY

## 2018-06-18 PROCEDURE — 770006 HCHG ROOM/CARE - MED/SURG/GYN SEMI*

## 2018-06-18 PROCEDURE — 501838 HCHG SUTURE GENERAL: Performed by: SURGERY

## 2018-06-18 RX ORDER — BUPIVACAINE HYDROCHLORIDE AND EPINEPHRINE 2.5; 5 MG/ML; UG/ML
INJECTION, SOLUTION EPIDURAL; INFILTRATION; INTRACAUDAL; PERINEURAL
Status: DISCONTINUED | OUTPATIENT
Start: 2018-06-18 | End: 2018-06-18 | Stop reason: HOSPADM

## 2018-06-18 RX ADMIN — OXYCODONE HYDROCHLORIDE 5 MG: 5 TABLET ORAL at 06:23

## 2018-06-18 RX ADMIN — ACETAMINOPHEN 650 MG: 325 TABLET, FILM COATED ORAL at 14:40

## 2018-06-18 RX ADMIN — OXYCODONE HYDROCHLORIDE AND ACETAMINOPHEN 500 MG: 500 TABLET ORAL at 08:38

## 2018-06-18 RX ADMIN — DOCUSATE SODIUM AND SENNOSIDES 2 TABLET: 8.6; 5 TABLET, FILM COATED ORAL at 22:05

## 2018-06-18 RX ADMIN — PIPERACILLIN AND TAZOBACTAM 4.5 G: 4; .5 INJECTION, POWDER, LYOPHILIZED, FOR SOLUTION INTRAVENOUS; PARENTERAL at 22:19

## 2018-06-18 RX ADMIN — OMEPRAZOLE 20 MG: 20 CAPSULE, DELAYED RELEASE ORAL at 08:38

## 2018-06-18 RX ADMIN — FAMOTIDINE 20 MG: 20 TABLET, FILM COATED ORAL at 08:38

## 2018-06-18 RX ADMIN — FENTANYL CITRATE 50 MCG: 50 INJECTION, SOLUTION INTRAMUSCULAR; INTRAVENOUS at 21:22

## 2018-06-18 RX ADMIN — MORPHINE SULFATE 2 MG: 4 INJECTION INTRAVENOUS at 23:42

## 2018-06-18 RX ADMIN — OXYCODONE HYDROCHLORIDE 5 MG: 5 TABLET ORAL at 18:56

## 2018-06-18 RX ADMIN — OXYCODONE HYDROCHLORIDE 5 MG: 5 TABLET ORAL at 22:06

## 2018-06-18 RX ADMIN — PIPERACILLIN AND TAZOBACTAM 4.5 G: 4; .5 INJECTION, POWDER, LYOPHILIZED, FOR SOLUTION INTRAVENOUS; PARENTERAL at 14:40

## 2018-06-18 RX ADMIN — DOCUSATE SODIUM AND SENNOSIDES 2 TABLET: 8.6; 5 TABLET, FILM COATED ORAL at 08:38

## 2018-06-18 RX ADMIN — FENTANYL CITRATE 50 MCG: 50 INJECTION, SOLUTION INTRAMUSCULAR; INTRAVENOUS at 21:10

## 2018-06-18 RX ADMIN — OXYCODONE HYDROCHLORIDE 5 MG: 5 TABLET ORAL at 03:08

## 2018-06-18 RX ADMIN — THERA TABS 1 TABLET: TAB at 08:38

## 2018-06-18 RX ADMIN — PIPERACILLIN AND TAZOBACTAM 4.5 G: 4; .5 INJECTION, POWDER, LYOPHILIZED, FOR SOLUTION INTRAVENOUS; PARENTERAL at 06:19

## 2018-06-18 RX ADMIN — ACETAMINOPHEN 650 MG: 325 TABLET, FILM COATED ORAL at 06:23

## 2018-06-18 RX ADMIN — ENOXAPARIN SODIUM 40 MG: 100 INJECTION SUBCUTANEOUS at 08:38

## 2018-06-18 RX ADMIN — OXYCODONE HYDROCHLORIDE 5 MG: 5 TABLET ORAL at 10:53

## 2018-06-18 ASSESSMENT — ENCOUNTER SYMPTOMS
TINGLING: 0
DEPRESSION: 0
SHORTNESS OF BREATH: 0
SENSORY CHANGE: 0
VOMITING: 0
COUGH: 0
MYALGIAS: 1
BLURRED VISION: 0
EYE PAIN: 0
ABDOMINAL PAIN: 0
SORE THROAT: 0
ORTHOPNEA: 0
NAUSEA: 0
DIZZINESS: 0
NECK PAIN: 0
SPEECH CHANGE: 0
CHILLS: 0
INSOMNIA: 0
BACK PAIN: 0
FEVER: 0
DIARRHEA: 0

## 2018-06-18 ASSESSMENT — PAIN SCALES - GENERAL
PAINLEVEL_OUTOF10: 3
PAINLEVEL_OUTOF10: 6
PAINLEVEL_OUTOF10: 5
PAINLEVEL_OUTOF10: 0
PAINLEVEL_OUTOF10: 5
PAINLEVEL_OUTOF10: 8
PAINLEVEL_OUTOF10: 7
PAINLEVEL_OUTOF10: 5
PAINLEVEL_OUTOF10: 5
PAINLEVEL_OUTOF10: 3
PAINLEVEL_OUTOF10: 3
PAINLEVEL_OUTOF10: 4
PAINLEVEL_OUTOF10: 3

## 2018-06-18 NOTE — PROGRESS NOTES
[facility] Hospitalist Progress Note    Date of Service: 2018    Chief Complaint  38 y.o. female admitted 6/15/2018 with right breast pain. She a history of breast cancer s/p bilateral mastectomies with bilateral expanders placed. She has a further history of a left sided infection with pseudomonas and subsequent expander removal. She has failed oral bactrim as an outpatient and has been found to have cellulitis of the right breast area.     Interval Problem Update  Surgery planned for tonight.   No events today  Feeling improved.     Consultants/Specialty  plastics- dr siddiqui  ID-     [facility] right breast:  Postoperative changes right breast. Soft tissue edema consistent with inflammation.  No drainable abscess identified.      Review of Systems   Constitutional: Negative for chills and fever.   HENT: Negative for sore throat.    Eyes: Negative for blurred vision and pain.   Respiratory: Negative for cough and shortness of breath.    Cardiovascular: Positive for chest pain. Negative for orthopnea.   Gastrointestinal: Negative for abdominal pain and nausea.   Genitourinary: Negative for dysuria and urgency.   Musculoskeletal: Negative for back pain and neck pain.   Skin: Negative for itching and rash.   Neurological: Negative for dizziness and tingling.   Psychiatric/Behavioral: Negative for depression. The patient does not have insomnia.    All other systems reviewed and are negative.     Physical Exam  Laboratory/Imaging   Hemodynamics  Temp (24hrs), Av.3 °C (99.1 °F), Min:36.8 °C (98.3 °F), Max:37.8 °C (100.1 °F)   Temperature: 36.8 °C (98.3 °F)  Pulse  Av.4  Min: 65  Max: 85    Blood Pressure: 116/74      Respiratory      Respiration: 18, Pulse Oximetry: 94 %             Fluids    Intake/Output Summary (Last 24 hours) at 18 0859  Last data filed at 18 0400   Gross per 24 hour   Intake             2000 ml   Output                0 ml   Net             2000 ml        Nutrition  Orders Placed This Encounter   Procedures   • Diet Order     Standing Status:   Standing     Number of Occurrences:   1     Order Specific Question:   Diet:     Answer:   Regular [1]     Physical Exam   Constitutional: She is oriented to person, place, and time. She appears well-developed and well-nourished. No distress.   Patient seen and examined  Discussed plan with RN   CLOVERT:   Right Ear: External ear normal.   Left Ear: External ear normal.   Nose: Nose normal.   Eyes: Conjunctivae are normal. Right eye exhibits no discharge. Left eye exhibits no discharge.   Neck: No JVD present.   Cardiovascular: Normal rate, regular rhythm, normal heart sounds and intact distal pulses.    No murmur heard.       Pulmonary/Chest: Effort normal and breath sounds normal. No stridor. No respiratory distress. She has no wheezes. She has no rales.   Abdominal: Soft. Bowel sounds are normal. She exhibits no distension. There is no tenderness.   Right breast with erythema extending over breast and into axilla. Tender.    Musculoskeletal: She exhibits no edema or tenderness.   Neurological: She is alert and oriented to person, place, and time. No cranial nerve deficit.   Skin: Skin is warm and dry. She is not diaphoretic. No erythema.       Psychiatric: She has a normal mood and affect. Her behavior is normal.   Nursing note and vitals reviewed.      Recent Labs      06/16/18   0522  06/17/18   0431  06/18/18   0222   WBC  5.9  5.1  6.0   RBC  3.73*  3.57*  3.58*   HEMOGLOBIN  10.4*  10.1*  10.2*   HEMATOCRIT  32.8*  31.9*  31.7*   MCV  87.9  89.4  88.5   MCH  27.9  28.3  28.5   MCHC  31.7*  31.7*  32.2*   RDW  46.1  46.2  45.5   PLATELETCT  349  328  358   MPV  9.1  8.7*  8.9*     Recent Labs      06/16/18   0523  06/17/18   0431  06/18/18   0222   SODIUM  138  140  137   POTASSIUM  3.9  4.1  3.9   CHLORIDE  106  108  104   CO2  24  23  23   GLUCOSE  99  91  112*   BUN  10  8  10   CREATININE  0.88  0.78  0.77    CALCIUM  9.2  8.9  9.3     Recent Labs      06/15/18   1254   APTT  30.2   INR  1.14*                  Assessment/Plan     Cellulitis- (present on admission)   Assessment & Plan    Patient has cellulitis in her right breast with underlying expander still present  Has history of pseudomonas on the left s/p removal of expander  She failed outpatient Bactrim  Surgery planning on removing expander today  Continue Broad-spectrum antibiotics  Infectious disease and surgery following        Breast cancer (HCC)- (present on admission)   Assessment & Plan    Patient had bilateral masectomy. She is BRCA positive  Radiation is planned on the left for this week.   Followed by dr hay  Contacted oncology today and discussed . No change in treatment plan. Will delay radiation         Morbid obesity (HCC)- (present on admission)   Assessment & Plan    Needs weight loss          Generalized anxiety disorder- (present on admission)   Assessment & Plan    As needed medications for anxiety          Quality-Core Measures   Reviewed items::  EKG reviewed, Labs reviewed, Medications reviewed and Radiology images reviewed  Casey catheter::  No Casey  DVT prophylaxis pharmacological::  Enoxaparin (Lovenox)  DVT prophylaxis - mechanical:  SCDs  Ulcer Prophylaxis::  Yes  Antibiotics:  Treating active infection/contamination beyond 24 hours perioperative coverage  Assessed for rehabilitation services:  Patient was assess for and/or received rehabilitation services during this hospitalization

## 2018-06-18 NOTE — PROGRESS NOTES
Infectious Disease Progress Note    Author: Arianna Casper M.D. Date & Time of service: 2018  10:35 AM    Chief Complaint:  FU Right Breast cellulitis    Interval History:  2018 MAXIMUM TEMPERATURE 99.6 WBC 5.1 platelets 328 creatinine 0.78   AF WBC 6 does not think erythema is improving, questions about removal of expander  Labs Reviewed, Medications Reviewed, Radiology Reviewed and Wound Reviewed.    Review of Systems:  Review of Systems   Constitutional: Negative for chills, fever and malaise/fatigue.   HENT: Negative for hearing loss.    Respiratory: Negative for cough.    Cardiovascular: Negative for chest pain.        R breast pain   Gastrointestinal: Negative for abdominal pain, diarrhea, nausea and vomiting.   Genitourinary: Negative for dysuria.   Musculoskeletal: Positive for myalgias.        Right breast still hurts   Neurological: Negative for sensory change and speech change.       Hemodynamics:  Temp (24hrs), Av.1 °C (98.8 °F), Min:36.4 °C (97.6 °F), Max:37.8 °C (100.1 °F)  Temperature: 36.4 °C (97.6 °F)  Pulse  Av.3  Min: 65  Max: 85   Blood Pressure: 110/64       Physical Exam:  Physical Exam   Constitutional: She is oriented to person, place, and time. She appears well-developed and well-nourished. No distress.   HENT:   Head: Normocephalic and atraumatic.   Mouth/Throat: No oropharyngeal exudate.   Eyes: EOM are normal. Pupils are equal, round, and reactive to light. No scleral icterus.   Neck: Neck supple.   Cardiovascular: Normal rate and regular rhythm.    No murmur heard.  Pulmonary/Chest: Effort normal. She has no wheezes. She has no rales.   Left breast has scarring but no erythema    R breast +erythema and implant in place. There is swelling which is extending to the right axilla in addition to induration   Abdominal: Soft. There is no tenderness.   Neurological: She is alert and oriented to person, place, and time. No cranial nerve deficit. Coordination normal.    Skin: There is erythema.   Vitals reviewed.      Meds:    Current Facility-Administered Medications:   •  famotidine  •  senna-docusate **AND** polyethylene glycol/lytes **AND** magnesium hydroxide **AND** bisacodyl  •  ondansetron  •  ondansetron  •  promethazine  •  promethazine  •  prochlorperazine  •  acetaminophen  •  Notify provider if pain remains uncontrolled **AND** Use the numeric rating scale (NRS-11) on regular floors and Critical-Care Pain Observation Tool (CPOT) on ICUs/Trauma to assess pain **AND** Pulse Ox (Oximetry) **AND** Pharmacy Consult Request **AND** If patient difficult to arouse and/or has respiratory depression, stop any opiates that are currently infusing and call a Rapid Response. **AND** oxyCODONE immediate-release **AND** oxyCODONE immediate-release **AND** morphine injection  •  [COMPLETED] piperacillin-tazobactam **AND** piperacillin-tazobactam  •  diphenhydrAMINE  •  ALPRAZolam  •  ascorbic acid  •  multivitamin  •  omeprazole  •  enoxaparin (LOVENOX) injection    Labs:  Recent Labs      06/16/18   0522  06/17/18   0431  06/18/18 0222   WBC  5.9  5.1  6.0   RBC  3.73*  3.57*  3.58*   HEMOGLOBIN  10.4*  10.1*  10.2*   HEMATOCRIT  32.8*  31.9*  31.7*   MCV  87.9  89.4  88.5   MCH  27.9  28.3  28.5   RDW  46.1  46.2  45.5   PLATELETCT  349  328  358   MPV  9.1  8.7*  8.9*   NEUTSPOLYS  61.90  51.10  57.90   LYMPHOCYTES  26.10  32.80  28.00   MONOCYTES  8.00  11.00  8.70   EOSINOPHILS  3.40  4.30  4.20   BASOPHILS  0.30  0.40  0.50     Recent Labs      06/16/18   0523  06/17/18   0431  06/18/18   0222   SODIUM  138  140  137   POTASSIUM  3.9  4.1  3.9   CHLORIDE  106  108  104   CO2  24  23  23   GLUCOSE  99  91  112*   BUN  10  8  10     Recent Labs      06/15/18   1254  06/16/18   0523  06/17/18   0431  06/18/18   0222   ALBUMIN  4.3   --   3.5   --    TBILIRUBIN  0.3   --   0.2   --    ALKPHOSPHAT  78   --   59   --    TOTPROTEIN  8.5*   --   7.1   --    ALTSGPT  15   --   10   --  "   ASTSGOT  17   --   11*   --    CREATININE  0.74  0.88  0.78  0.77       Imaging:  Us-breast Limited-right    Result Date: 6/15/2018  6/15/2018 1:31 PM HISTORY/REASON FOR EXAM: Status post mastectomy reconstruction and breast expander. Inflammation. Possible cellulitis. Possible abscess. TECHNIQUE/EXAM DESCRIPTION AND NUMBER OF VIEWS: Right breast ultrasound. COMPARISON:   None FINDINGS:      Postoperative changes involving the right breast. Artifact consistent with expander. Soft tissue edema is identified consistent with inflammation possibly cellulitis. No organized fluid collection consistent with abscess identified.     Postoperative changes right breast. Soft tissue edema consistent with inflammation. No drainable abscess identified. These results were given to the patient at the time of visit.       Micro:  Results     Procedure Component Value Units Date/Time    BLOOD CULTURE [[id number]]  (Abnormal) Collected:  06/15/18 1315    Order Status:  Completed Specimen:  Blood from Peripheral Updated:  06/17/18 1816     Significant Indicator POS (POS)     Source BLD     Site PERIPHERAL     Blood Culture Growth detected by Bactec instrument. 16:25  Gram positive cocci: Possible Staphylococcus sp.  Negative for Staphylococcus aureus and MRSA by PCR. Correlate  ongoing need for antibiotics with clinical condition.  Further report to follow.   (A)    Narrative:       CALL  Glez  141 tel. [phone],  CALLED  141 tel. [phone] 06/17/2018, 18:16, RB PERF. RESULTS CALLED TO:RN  [id number] and Lft Message Milagro Fengelian  2 of 2 blood culture x2  Sites order. Per Hospital Policy:  Only change Specimen Src: to \"Line\" if specified by physician  order.    BLOOD CULTURE [[id number]] Collected:  06/15/18 1254    Order Status:  Completed Specimen:  Blood from Peripheral Updated:  06/16/18 0739     Significant Indicator NEG     Source BLD     Site PERIPHERAL     Blood Culture No Growth    Note: Blood cultures are incubated for 5 days " "and  are monitored continuously.Positive blood cultures  are called to the RN and reported as soon as  they are identified.      Narrative:       1 of 2 for Blood Culture x 2 sites order. Per Hospital  Policy: Only change Specimen Src: to \"Line\" if specified by  physician order.          Assessment:  Active Hospital Problems    Diagnosis   • Cellulitis [L03.90]   • Breast cancer (HCC) [C50.919]   • Generalized anxiety disorder [F41.1]   • Morbid obesity (HCC) [E66.01]       Plan:  Right breast infection-ongoing work  Patient is status post mastectomy and expander in place  Failed outpatient bactrim  The ultrasound done on 6/15/2018 did not show any abscess  Likely the expander is infected  Recommend removal of expander as pt will likely have recurrence of source control not achieved - planned for today  Continue Zosyn and Zyvox     Positive blood culture, new  Likely contaminant  Bcx (1/2) 6/15 - possible staph  Repeat BCx today    Left breast implant infection  Status post removal of the implant on 5/16/2018  Cultures were Pseudomonas    Breast CA  Patient is for radiation    Discussed with internal medicine RN and Dr Garcia.  "

## 2018-06-18 NOTE — PROGRESS NOTES
Pt A&Ox4.  Right breast red, warm, tender. Pt reports redness and swelling appear to be slightly decreasing.  Tolerating regular diet, denies n/v. + bowel sounds, + flatus, LBM yesterday 6/17.  Saturating >90% on RA.  Pt ambulates independently, demonstrates steady gait.  Updated on plan of care. Safety education provided. Bed locked in low. Call light within reach. Rounding in place.     Asked by Dr. Perkins to contact ID regarding their recommendations. Dr. Perkins would like to remove spacer tonight. Spoke with Dr. Tremayne MD to round this AM.

## 2018-06-18 NOTE — CARE PLAN
Problem: Infection  Goal: Will remain free from infection  Outcome: PROGRESSING AS EXPECTED  ID on board. IV abx in use. Sx planned for today to remove spacer.    Problem: Pain Management  Goal: Pain level will decrease to patient's comfort goal  Outcome: PROGRESSING AS EXPECTED  Well controlled with pain medication PRN

## 2018-06-18 NOTE — CARE PLAN
Problem: Infection  Goal: Will remain free from infection  Outcome: PROGRESSING AS EXPECTED  New antibiotic regiment started today.    Problem: Bowel/Gastric:  Goal: Normal bowel function is maintained or improved  Outcome: PROGRESSING AS EXPECTED  LBM 6/17. Continues to take stool softeners. Adequate fluid intake.

## 2018-06-18 NOTE — PROGRESS NOTES
Received bedside report and assumed care at 1900.    Pt is A&O x4.  Pain 5/10, medicated per MAR.  Denies nausea  Tolerating regular diet  Redness to right breast  + Urine output  + BM   + Flatus  Up Self   Bed in lowest position and locked.  Pt resting comfortably now.  Review plan of care with patient  Call light within reach  Hourly rounds in place  All needs met at this time

## 2018-06-18 NOTE — PROGRESS NOTES
Recieved update from lab on pt. positive blood cultures. Spoke with Pharmacist Apolonia about positive blood culture, she recommended to discontinue ZYVOX, continue with Zosyn. Updated Jose Pickens received phone order with read back to discontinue Zyvox and that he would update Infectious Disease in the morning. No other orders received.

## 2018-06-19 PROBLEM — Z15.02 BRCA GENE MUTATION POSITIVE IN FEMALE: Status: ACTIVE | Noted: 2018-06-19

## 2018-06-19 PROBLEM — Z15.01 BRCA GENE MUTATION POSITIVE IN FEMALE: Status: ACTIVE | Noted: 2018-06-19

## 2018-06-19 PROBLEM — Z15.09 BRCA GENE MUTATION POSITIVE IN FEMALE: Status: ACTIVE | Noted: 2018-06-19

## 2018-06-19 PROBLEM — T85.79XA INFECTED BREAST TISSUE EXPANDER (HCC): Status: ACTIVE | Noted: 2018-06-15

## 2018-06-19 LAB
ANION GAP SERPL CALC-SCNC: 9 MMOL/L (ref 0–11.9)
BASOPHILS # BLD AUTO: 0.5 % (ref 0–1.8)
BASOPHILS # BLD: 0.03 K/UL (ref 0–0.12)
BUN SERPL-MCNC: 11 MG/DL (ref 8–22)
CALCIUM SERPL-MCNC: 9 MG/DL (ref 8.5–10.5)
CHLORIDE SERPL-SCNC: 105 MMOL/L (ref 96–112)
CO2 SERPL-SCNC: 24 MMOL/L (ref 20–33)
CREAT SERPL-MCNC: 0.8 MG/DL (ref 0.5–1.4)
EOSINOPHIL # BLD AUTO: 0.23 K/UL (ref 0–0.51)
EOSINOPHIL NFR BLD: 4 % (ref 0–6.9)
ERYTHROCYTE [DISTWIDTH] IN BLOOD BY AUTOMATED COUNT: 46.5 FL (ref 35.9–50)
FOLATE SERPL-MCNC: 20.5 NG/ML
GLUCOSE SERPL-MCNC: 90 MG/DL (ref 65–99)
GRAM STN SPEC: NORMAL
HCT VFR BLD AUTO: 31.4 % (ref 37–47)
HGB BLD-MCNC: 10 G/DL (ref 12–16)
IMM GRANULOCYTES # BLD AUTO: 0.02 K/UL (ref 0–0.11)
IMM GRANULOCYTES NFR BLD AUTO: 0.3 % (ref 0–0.9)
IRON SATN MFR SERPL: 7 % (ref 15–55)
IRON SERPL-MCNC: 23 UG/DL (ref 40–170)
LYMPHOCYTES # BLD AUTO: 2.17 K/UL (ref 1–4.8)
LYMPHOCYTES NFR BLD: 37.7 % (ref 22–41)
MCH RBC QN AUTO: 28.3 PG (ref 27–33)
MCHC RBC AUTO-ENTMCNC: 31.8 G/DL (ref 33.6–35)
MCV RBC AUTO: 89 FL (ref 81.4–97.8)
MONOCYTES # BLD AUTO: 0.46 K/UL (ref 0–0.85)
MONOCYTES NFR BLD AUTO: 8 % (ref 0–13.4)
NEUTROPHILS # BLD AUTO: 2.84 K/UL (ref 2–7.15)
NEUTROPHILS NFR BLD: 49.5 % (ref 44–72)
NRBC # BLD AUTO: 0 K/UL
NRBC BLD-RTO: 0 /100 WBC
PLATELET # BLD AUTO: 391 K/UL (ref 164–446)
PMV BLD AUTO: 8.7 FL (ref 9–12.9)
POTASSIUM SERPL-SCNC: 4 MMOL/L (ref 3.6–5.5)
RBC # BLD AUTO: 3.53 M/UL (ref 4.2–5.4)
SIGNIFICANT IND 70042: NORMAL
SITE SITE: NORMAL
SODIUM SERPL-SCNC: 138 MMOL/L (ref 135–145)
SOURCE SOURCE: NORMAL
TIBC SERPL-MCNC: 323 UG/DL (ref 250–450)
TSH SERPL DL<=0.005 MIU/L-ACNC: 2.49 UIU/ML (ref 0.38–5.33)
VIT B12 SERPL-MCNC: 694 PG/ML (ref 211–911)
WBC # BLD AUTO: 5.8 K/UL (ref 4.8–10.8)

## 2018-06-19 PROCEDURE — 82607 VITAMIN B-12: CPT

## 2018-06-19 PROCEDURE — A9270 NON-COVERED ITEM OR SERVICE: HCPCS | Performed by: SURGERY

## 2018-06-19 PROCEDURE — A9270 NON-COVERED ITEM OR SERVICE: HCPCS | Performed by: HOSPITALIST

## 2018-06-19 PROCEDURE — 770006 HCHG ROOM/CARE - MED/SURG/GYN SEMI*

## 2018-06-19 PROCEDURE — 700105 HCHG RX REV CODE 258

## 2018-06-19 PROCEDURE — 700102 HCHG RX REV CODE 250 W/ 637 OVERRIDE(OP): Performed by: SURGERY

## 2018-06-19 PROCEDURE — 700102 HCHG RX REV CODE 250 W/ 637 OVERRIDE(OP): Performed by: INTERNAL MEDICINE

## 2018-06-19 PROCEDURE — 700102 HCHG RX REV CODE 250 W/ 637 OVERRIDE(OP): Performed by: HOSPITALIST

## 2018-06-19 PROCEDURE — 700111 HCHG RX REV CODE 636 W/ 250 OVERRIDE (IP): Performed by: HOSPITALIST

## 2018-06-19 PROCEDURE — A9270 NON-COVERED ITEM OR SERVICE: HCPCS | Performed by: INTERNAL MEDICINE

## 2018-06-19 PROCEDURE — 36415 COLL VENOUS BLD VENIPUNCTURE: CPT

## 2018-06-19 PROCEDURE — 700105 HCHG RX REV CODE 258: Performed by: HOSPITALIST

## 2018-06-19 PROCEDURE — 82746 ASSAY OF FOLIC ACID SERUM: CPT

## 2018-06-19 PROCEDURE — 83550 IRON BINDING TEST: CPT

## 2018-06-19 PROCEDURE — 84443 ASSAY THYROID STIM HORMONE: CPT

## 2018-06-19 PROCEDURE — 99232 SBSQ HOSP IP/OBS MODERATE 35: CPT | Performed by: INTERNAL MEDICINE

## 2018-06-19 PROCEDURE — 99233 SBSQ HOSP IP/OBS HIGH 50: CPT | Performed by: INTERNAL MEDICINE

## 2018-06-19 PROCEDURE — 80048 BASIC METABOLIC PNL TOTAL CA: CPT

## 2018-06-19 PROCEDURE — 85025 COMPLETE CBC W/AUTO DIFF WBC: CPT

## 2018-06-19 PROCEDURE — 83540 ASSAY OF IRON: CPT

## 2018-06-19 RX ORDER — SUCRALFATE 1 G/1
1 TABLET ORAL EVERY 6 HOURS PRN
Status: DISCONTINUED | OUTPATIENT
Start: 2018-06-19 | End: 2018-06-20 | Stop reason: HOSPADM

## 2018-06-19 RX ORDER — ALUMINA, MAGNESIA, AND SIMETHICONE 2400; 2400; 240 MG/30ML; MG/30ML; MG/30ML
10 SUSPENSION ORAL 4 TIMES DAILY PRN
Status: DISCONTINUED | OUTPATIENT
Start: 2018-06-19 | End: 2018-06-20 | Stop reason: HOSPADM

## 2018-06-19 RX ORDER — FERROUS SULFATE 325(65) MG
325 TABLET ORAL
Status: DISCONTINUED | OUTPATIENT
Start: 2018-06-20 | End: 2018-06-20 | Stop reason: HOSPADM

## 2018-06-19 RX ORDER — OXYCODONE AND ACETAMINOPHEN 10; 325 MG/1; MG/1
1 TABLET ORAL EVERY 4 HOURS PRN
Status: DISCONTINUED | OUTPATIENT
Start: 2018-06-19 | End: 2018-06-20 | Stop reason: HOSPADM

## 2018-06-19 RX ORDER — LINEZOLID 600 MG/1
600 TABLET, FILM COATED ORAL EVERY 12 HOURS
Status: DISCONTINUED | OUTPATIENT
Start: 2018-06-19 | End: 2018-06-20 | Stop reason: HOSPADM

## 2018-06-19 RX ORDER — SODIUM CHLORIDE 9 MG/ML
INJECTION, SOLUTION INTRAVENOUS
Status: COMPLETED
Start: 2018-06-19 | End: 2018-06-19

## 2018-06-19 RX ADMIN — LINEZOLID 600 MG: 600 TABLET, FILM COATED ORAL at 21:13

## 2018-06-19 RX ADMIN — OXYCODONE HYDROCHLORIDE AND ACETAMINOPHEN 500 MG: 500 TABLET ORAL at 08:22

## 2018-06-19 RX ADMIN — OXYCODONE HYDROCHLORIDE AND ACETAMINOPHEN 1 TABLET: 10; 325 TABLET ORAL at 22:37

## 2018-06-19 RX ADMIN — THERA TABS 1 TABLET: TAB at 08:25

## 2018-06-19 RX ADMIN — FAMOTIDINE 20 MG: 20 TABLET, FILM COATED ORAL at 08:25

## 2018-06-19 RX ADMIN — OXYCODONE HYDROCHLORIDE 5 MG: 5 TABLET ORAL at 01:35

## 2018-06-19 RX ADMIN — OXYCODONE HYDROCHLORIDE AND ACETAMINOPHEN 1 TABLET: 10; 325 TABLET ORAL at 18:20

## 2018-06-19 RX ADMIN — LINEZOLID 600 MG: 600 TABLET, FILM COATED ORAL at 08:26

## 2018-06-19 RX ADMIN — DOCUSATE SODIUM AND SENNOSIDES 2 TABLET: 8.6; 5 TABLET, FILM COATED ORAL at 10:54

## 2018-06-19 RX ADMIN — PIPERACILLIN AND TAZOBACTAM 4.5 G: 4; .5 INJECTION, POWDER, LYOPHILIZED, FOR SOLUTION INTRAVENOUS; PARENTERAL at 05:51

## 2018-06-19 RX ADMIN — OXYCODONE HYDROCHLORIDE AND ACETAMINOPHEN 1 TABLET: 10; 325 TABLET ORAL at 08:22

## 2018-06-19 RX ADMIN — MORPHINE SULFATE 2 MG: 4 INJECTION INTRAVENOUS at 03:20

## 2018-06-19 RX ADMIN — OXYCODONE HYDROCHLORIDE 5 MG: 5 TABLET ORAL at 05:49

## 2018-06-19 RX ADMIN — SODIUM CHLORIDE 500 ML: 9 INJECTION, SOLUTION INTRAVENOUS at 23:38

## 2018-06-19 RX ADMIN — ENOXAPARIN SODIUM 40 MG: 100 INJECTION SUBCUTANEOUS at 08:28

## 2018-06-19 RX ADMIN — OXYCODONE HYDROCHLORIDE AND ACETAMINOPHEN 1 TABLET: 10; 325 TABLET ORAL at 13:50

## 2018-06-19 RX ADMIN — PIPERACILLIN AND TAZOBACTAM 4.5 G: 4; .5 INJECTION, POWDER, LYOPHILIZED, FOR SOLUTION INTRAVENOUS; PARENTERAL at 16:06

## 2018-06-19 RX ADMIN — PIPERACILLIN AND TAZOBACTAM 4.5 G: 4; .5 INJECTION, POWDER, LYOPHILIZED, FOR SOLUTION INTRAVENOUS; PARENTERAL at 23:38

## 2018-06-19 RX ADMIN — DOCUSATE SODIUM AND SENNOSIDES 2 TABLET: 8.6; 5 TABLET, FILM COATED ORAL at 21:13

## 2018-06-19 ASSESSMENT — PAIN SCALES - GENERAL
PAINLEVEL_OUTOF10: 6
PAINLEVEL_OUTOF10: 5
PAINLEVEL_OUTOF10: 8
PAINLEVEL_OUTOF10: 4
PAINLEVEL_OUTOF10: 6
PAINLEVEL_OUTOF10: ASSUMED PAIN PRESENT
PAINLEVEL_OUTOF10: 7

## 2018-06-19 ASSESSMENT — ENCOUNTER SYMPTOMS
CHILLS: 0
NAUSEA: 0
NERVOUS/ANXIOUS: 0
SENSORY CHANGE: 0
SHORTNESS OF BREATH: 0
COUGH: 0
SORE THROAT: 0
MYALGIAS: 1
SPEECH CHANGE: 0
BLURRED VISION: 0
VOMITING: 0
DIZZINESS: 0
ABDOMINAL PAIN: 0
FEVER: 0
DEPRESSION: 0
MYALGIAS: 0
DIARRHEA: 0

## 2018-06-19 NOTE — PROGRESS NOTES
Infectious Disease Progress Note    Author: Arianna Casper M.D. Date & Time of service: 2018  9:45 AM    Chief Complaint:  FU Right Breast cellulitis    Interval History:  2018 MAXIMUM TEMPERATURE 99.6 WBC 5.1 platelets 328 creatinine 0.78   AF WBC 6 does not think erythema is improving, questions about removal of expander   AF WBC 5.8 s/p expander removal yesterday, feels well with no new issues, tolerating abx without side effects  Labs Reviewed, Medications Reviewed, Radiology Reviewed and Wound Reviewed.    Review of Systems:  Review of Systems   Constitutional: Negative for chills, fever and malaise/fatigue.   HENT: Negative for hearing loss.    Respiratory: Negative for cough.    Cardiovascular: Negative for chest pain.   Gastrointestinal: Negative for abdominal pain, diarrhea, nausea and vomiting.   Genitourinary: Negative for dysuria.   Musculoskeletal: Positive for myalgias.        Right breast pain - controlled   Neurological: Negative for sensory change and speech change.       Hemodynamics:  Temp (24hrs), Av.2 °C (98.9 °F), Min:36.5 °C (97.7 °F), Max:37.6 °C (99.7 °F)  Temperature: 37.6 °C (99.7 °F)  Pulse  Av.7  Min: 65  Max: 93   Blood Pressure: 116/72, NIBP: 123/71       Physical Exam:  Physical Exam   Constitutional: She is oriented to person, place, and time. She appears well-developed and well-nourished. No distress.   HENT:   Head: Normocephalic and atraumatic.   Mouth/Throat: No oropharyngeal exudate.   Eyes: EOM are normal. Pupils are equal, round, and reactive to light. No scleral icterus.   Neck: Neck supple.   Cardiovascular: Normal rate and regular rhythm.    No murmur heard.  Pulmonary/Chest: Effort normal. She has no wheezes. She has no rales.   Left breast has scarring but no erythema    R breast in surgical dressing +DARA drain   Abdominal: Soft. There is no tenderness.   Neurological: She is alert and oriented to person, place, and time. No cranial nerve  deficit. Coordination normal.   Skin: There is erythema.   Vitals reviewed.      Meds:    Current Facility-Administered Medications:   •  linezolid  •  oxyCODONE-acetaminophen  •  senna-docusate **AND** polyethylene glycol/lytes **AND** magnesium hydroxide **AND** bisacodyl  •  ondansetron  •  ondansetron  •  promethazine  •  promethazine  •  prochlorperazine  •  acetaminophen  •  Notify provider if pain remains uncontrolled **AND** Use the numeric rating scale (NRS-11) on regular floors and Critical-Care Pain Observation Tool (CPOT) on ICUs/Trauma to assess pain **AND** Pulse Ox (Oximetry) **AND** Pharmacy Consult Request **AND** If patient difficult to arouse and/or has respiratory depression, stop any opiates that are currently infusing and call a Rapid Response. **AND** [DISCONTINUED] oxyCODONE immediate-release **AND** [DISCONTINUED] oxyCODONE immediate-release **AND** morphine injection  •  [COMPLETED] piperacillin-tazobactam **AND** piperacillin-tazobactam  •  diphenhydrAMINE  •  ALPRAZolam  •  ascorbic acid  •  multivitamin  •  omeprazole  •  enoxaparin (LOVENOX) injection    Labs:  Recent Labs      06/17/18 0431 06/18/18 0222 06/19/18   0534   WBC  5.1  6.0  5.8   RBC  3.57*  3.58*  3.53*   HEMOGLOBIN  10.1*  10.2*  10.0*   HEMATOCRIT  31.9*  31.7*  31.4*   MCV  89.4  88.5  89.0   MCH  28.3  28.5  28.3   RDW  46.2  45.5  46.5   PLATELETCT  328  358  391   MPV  8.7*  8.9*  8.7*   NEUTSPOLYS  51.10  57.90  49.50   LYMPHOCYTES  32.80  28.00  37.70   MONOCYTES  11.00  8.70  8.00   EOSINOPHILS  4.30  4.20  4.00   BASOPHILS  0.40  0.50  0.50     Recent Labs      06/17/18 0431 06/18/18 0222 06/19/18   0534   SODIUM  140  137  138   POTASSIUM  4.1  3.9  4.0   CHLORIDE  108  104  105   CO2  23  23  24   GLUCOSE  91  112*  90   BUN  8  10  11     Recent Labs      06/17/18   0431  06/18/18   0222  06/19/18   0534   ALBUMIN  3.5   --    --    TBILIRUBIN  0.2   --    --    ALKPHOSPHAT  59   --    --     TOTPROTEIN  7.1   --    --    ALTSGPT  10   --    --    ASTSGOT  11*   --    --    CREATININE  0.78  0.77  0.80       Imaging:  Us-breast Limited-right    Result Date: 6/15/2018  6/15/2018 1:31 PM HISTORY/REASON FOR EXAM: Status post mastectomy reconstruction and breast expander. Inflammation. Possible cellulitis. Possible abscess. TECHNIQUE/EXAM DESCRIPTION AND NUMBER OF VIEWS: Right breast ultrasound. COMPARISON:   None FINDINGS:      Postoperative changes involving the right breast. Artifact consistent with expander. Soft tissue edema is identified consistent with inflammation possibly cellulitis. No organized fluid collection consistent with abscess identified.     Postoperative changes right breast. Soft tissue edema consistent with inflammation. No drainable abscess identified. These results were given to the patient at the time of visit.       Micro:  Results     Procedure Component Value Units Date/Time    ANAEROBIC CULTURE [[id number]] Collected:  06/18/18 2036    Order Status:  Completed Specimen:  Other Updated:  06/18/18 2203    CULTURE TISSUE W/ GRM STAIN [[id number]] Collected:  06/18/18 2036    Order Status:  Completed Specimen:  Other Updated:  06/18/18 2203    CULTURE WOUND W/ GRAM STAIN [[id number]] Collected:  06/18/18 2036    Order Status:  Completed Specimen:  Other Updated:  06/18/18 2200    ANAEROBIC CULTURE [[id number]] Collected:  06/18/18 2036    Order Status:  Completed Specimen:  Other Updated:  06/18/18 2200    ANAEROBIC CULTURE [[id number]] Collected:  06/18/18 2031    Order Status:  Completed Specimen:  Other Updated:  06/18/18 2158    CULTURE WOUND W/ GRAM STAIN [[id number]] Collected:  06/18/18 2031    Order Status:  Completed Specimen:  Other Updated:  06/18/18 2158    BLOOD CULTURE [[id number]] Collected:  06/18/18 1139    Order Status:  Completed Specimen:  Other Updated:  06/18/18 1226    BLOOD CULTURE [[id number]] Collected:  06/18/18 1144    Order Status:  Completed Specimen:  Blood  "from Peripheral Updated:  06/18/18 1226    Narrative:       Per Hospital Policy: Only change Specimen Src: to \"Line\" if  specified by physician order.    BLOOD CULTURE [[id number]]  (Abnormal) Collected:  06/15/18 1315    Order Status:  Completed Specimen:  Blood from Peripheral Updated:  06/18/18 1212     Significant Indicator POS (POS)     Source BLD     Site PERIPHERAL     Blood Culture Growth detected by Bactec instrument. 16:25  Gram positive cocci: Possible Staphylococcus sp.  Negative for Staphylococcus aureus and MRSA by PCR. Correlate  ongoing need for antibiotics with clinical condition.   (A)      Coagulase Negative Staphylococcus-possible contaminant  Isolated from one bottle only, possible skin contaminant  please correlate with clinical condition.   (A)    Narrative:       CALL  Glez  141 tel. [phone],  CALLED  141 tel. [phone] 06/17/2018, 18:16, RB PERF. RESULTS CALLED TO:RN  [id number] and Lft Message Milagro Macario  2 of 2 blood culture x2  Sites order. Per Hospital Policy:  Only change Specimen Src: to \"Line\" if specified by physician  order.    BLOOD CULTURE [[id number]] Collected:  06/15/18 1254    Order Status:  Completed Specimen:  Blood from Peripheral Updated:  06/16/18 0739     Significant Indicator NEG     Source BLD     Site PERIPHERAL     Blood Culture No Growth    Note: Blood cultures are incubated for 5 days and  are monitored continuously.Positive blood cultures  are called to the RN and reported as soon as  they are identified.      Narrative:       1 of 2 for Blood Culture x 2 sites order. Per Hospital  Policy: Only change Specimen Src: to \"Line\" if specified by  physician order.          Assessment:  Active Hospital Problems    Diagnosis   • Cellulitis [L03.90]   • Breast cancer ([facility]) [C50.919]   • Generalized anxiety disorder [F41.1]   • Morbid obesity ([facility]) [E66.01]       Plan:  Right breast infection-ongoing work  Patient is status post mastectomy and expander in place  Failed " outpatient bactrim  The ultrasound done on 6/15/2018 did not show any abscess  Likely the expander is infected  S/p removal of expander on 6/18  Continue Zosyn and Zyvox   Plan on 2 week course total  Follow OR cultures  Anticipate transition to PO linezolid and cipro at discharge  Please ensure pt can obtain linezolid as an outpatient    Positive blood culture  Likely contaminant  Bcx (1/2) 6/15 - CoNS   Repeat BCx 6/18 - pending    Left breast implant infection  Status post removal of the implant on 5/16/2018  Cultures were Pseudomonas    Breast CA  Patient is for radiation    Discussed with internal medicine.

## 2018-06-19 NOTE — PROGRESS NOTES
Doing well but needs better pain control with po meds.  Ok for D/C once ID approves and better pain control.  She will need to follow up with me next week.

## 2018-06-19 NOTE — CONSULTS
DATE OF SERVICE:  06/15/2018    REASON FOR CONSULTATION:  Right breast erythema after expander placement at [facility].    CONSULTANT:  Niki Osuna MD    BRIEF HISTORY OF PRESENTING ILLNESS:  The patient is a 38-year-old female with   a history of left breast cancer, status post bilateral mastectomies with   immediate expander reconstruction at [facility].  She has also been found to   be BRCA positive.  In the postoperative period, she developed necrosis on the   left side and required immediate debridement and closure of the area; however,   she still developed exposure of the left breast expander.  The left expander   was removed.  She has since developed erythema and swelling on the right   breast despite oral antibiotics, so she was therefore admitted for IV   antibiotics.  She had undergone neoadjuvant treatment and is scheduled to   undergo radiation shortly.  She reports temps as high as 100.2.    PAST MEDICAL HISTORY:  1.  Left breast cancer, status post neoadjuvant therapy.  2.  Anxiety.    PAST SURGICAL HISTORY:  1.  Bilateral nipple sparing mastectomies with expander reconstruction.  2.  Explantation of left breast implant after debridement of mastectomy flap   necrosis.    SOCIAL HISTORY:  She has a smoking history; however, she quit in January (6   months ago).    ALLERGIES:  No known drug allergies.    MEDICATIONS (OUTPATIENT):  Multivitamin, vitamin D, omeprazole, Xanax, vitamin   C, Bactrim.    PHYSICAL EXAMINATION:  The left breast shows amastia, healed incisions   with some dimpling in the skin due to explantation of the expander.    Right breast shows induration and erythema surrounding the implant; however,   there is no fluctuance or drainage.  Her incision has inframammary approach;   however, the redness is surrounding the expander.  There is warmth as well as   edema throughout, but no fluctuance.    LABORATORY DATA:  1.  WBC 6.3.  INR 1.1.  Awaiting separate CRP.  2.  Ultrasound of  breasts shows no fluid.    ASSESSMENT:  Suspect implant infection, right breast, with associated   cellulitis.    PLAN:  I have recommended IV antibiotics with infectious disease consult.    I imagine she is going to need the expander removed and I have discussed the   possibility with her.  She is very adamant about trying to salvage the implant   and would like a few days to decide.  If there is no improvement with   antibiotics, this may push her towards explantation.    We will continue to follow and obtain a sed rate and CRP.       ____________________________________     DIOR GRANDE MD LMT / NTS    DD:  06/18/2018 19:59:55  DT:  06/18/2018 20:53:10    D#:  [id number]  Job#:  [id number]

## 2018-06-19 NOTE — PROGRESS NOTES
Progress Note               Author: Niki KAREY Osuna Date & Time created: 2018  8:02 PM     Interval History:  Still with erythema right breast.    Review of Systems:  ROS    Physical Exam:  Physical Exam  Decreased edema of the right breast.  She still has erythema surrounding the expander.     Labs:        Invalid input(s): [id number]      Recent Labs      18   SODIUM  138  140  137   POTASSIUM  3.9  4.1  3.9   CHLORIDE  106  108  104   CO2  24  23  23   BUN  10  8  10   CREATININE  0.88  0.78  0.77   CALCIUM  9.2  8.9  9.3     Recent Labs      18   04318   ALTSGPT   --   10   --    ASTSGOT   --   11*   --    ALKPHOSPHAT   --   59   --    TBILIRUBIN   --   0.2   --    GLUCOSE  99  91  112*     Recent Labs      18   RBC  3.73*  3.57*  3.58*   HEMOGLOBIN  10.4*  10.1*  10.2*   HEMATOCRIT  32.8*  31.9*  31.7*   PLATELETCT  349  328  358     Recent Labs      18   WBC  5.9  5.1  6.0   NEUTSPOLYS  61.90  51.10  57.90   LYMPHOCYTES  26.10  32.80  28.00   MONOCYTES  8.00  11.00  8.70   EOSINOPHILS  3.40  4.30  4.20   BASOPHILS  0.30  0.40  0.50   ASTSGOT   --   11*   --    ALTSGPT   --   10   --    ALKPHOSPHAT   --   59   --    TBILIRUBIN   --   0.2   --      Hemodynamics:  Temp (24hrs), Av.8 °C (98.2 °F), Min:36.4 °C (97.6 °F), Max:37.4 °C (99.3 °F)  Temperature: 36.5 °C (97.7 °F)  Pulse  Av.5  Min: 65  Max: 85   Blood Pressure: 120/81     Respiratory:    Respiration: 16, Pulse Oximetry: 97 %           Fluids:    Intake/Output Summary (Last 24 hours) at 18  Last data filed at 18 1800   Gross per 24 hour   Intake             1200 ml   Output              600 ml   Net              600 ml        GI/Nutrition:  Orders Placed This Encounter   Procedures   • DIET NPO     Standing Status:   Standing     Number of  Occurrences:   1     Order Specific Question:   Restrict to:     Answer:   Sips with Medications [3]     Medical Decision Making, by Problem:  Active Hospital Problems    Diagnosis   • Cellulitis [L03.90]   • Breast cancer (HCC) [C50.919]   • Generalized anxiety disorder [F41.1]   • Morbid obesity (HCC) [E66.01]       Plan:  We discussed removal of the right breast expander.  The other option would be to send her home with IV ABX (per ID).  My fear is that she will ultimately come to explantation during her radiation course therefore delaying her treatment.  She will consider this before making a decision.     Quality-Core Measures

## 2018-06-19 NOTE — CONSULTS
DATE OF SERVICE:  06/15/2018    REASON FOR CONSULTATION:  Right breast erythema after expander placement at [facility].    CONSULTANT:  Niki Osuna MD    BRIEF HISTORY OF PRESENTING ILLNESS:  The patient is a 38-year-old female with   a history of left breast cancer, status post bilateral mastectomies with   immediate expander reconstruction at [facility] [place].  She has also been found to   be BRCA positive.  In the postoperative period, she developed necrosis on the   left side and required immediate debridement and closure of the area; however,   she still developed exposure of the left breast expander.  The left expander   was removed.  She has since developed erythema and swelling on the right   breast despite oral antibiotics, so she was therefore admitted for IV   antibiotics.  She had undergone neoadjuvant treatment and is scheduled to   undergo radiation shortly.  She reports temps as high as 100.2.    PAST MEDICAL HISTORY:  1.  Left breast cancer, status post neoadjuvant therapy.  2.  Anxiety.    PAST SURGICAL HISTORY:  1.  Bilateral nipple sparing mastectomies with expander reconstruction.  2.  Explantation of left breast implant after debridement of mastectomy flap   necrosis.    SOCIAL HISTORY:  She has a smoking history; however, she quit in January (6   months ago).    ALLERGIES:  No known drug allergies.    MEDICATIONS (OUTPATIENT):  Multivitamin, vitamin D, omeprazole, Xanax, vitamin   C, Bactrim.    PHYSICAL EXAMINATION:  The left breast shows ____ mastia, healed incisions   with some dimpling in the skin due to explantation of the expander.    Right breast shows induration and erythema surrounding the implant; however,   there is no fluctuance or drainage.  Her incision has inframammary approach;   however, the redness is surrounding the expander.  There is warmth as well as   edema throughout, but no fluctuance.    LABORATORY DATA:  1.  WBC 6.3.  INR 1.1.  Awaiting separate CRP.  2.  Ultrasound  of breasts shows no fluid.    ASSESSMENT:  Suspect implant infection, right breast, with associated   cellulitis.    PLAN:  I have recommended IV antibiotics with infectious disease consult.    I imagine she is going to need the expander removed and I have discussed the   possibility with her.  She is very adamant about trying to salvage the implant   and would like a few days to decide.  If there is no improvement with   antibiotics, this may push her towards explantation.    We will continue to follow and obtain a sed rate and CRP.       ____________________________________     DIOR GRANDE MD LMT / SHELLY    DD:  06/18/2018 19:59:55  DT:  06/18/2018 20:53:10    D#:  [id number]  Job#:  [id number]

## 2018-06-19 NOTE — DISCHARGE PLANNING
Anticipated Discharge Disposition: Zyvox    Action: Received script for Zyvox.  LSW faxed the script to the [facility] on [phone], per patient's request.    Barriers to Discharge: None.    Plan: Home, pending medical clearance and clarification on insurance prior authorization for Zyvox.

## 2018-06-19 NOTE — OP REPORT
DATE OF SERVICE:  06/18/2018    PREOPERATIVE DIAGNOSES:  1.  Right breast implant infection.  2.  History of left breast cancer, status post bilateral mastectomy with   expander reconstruction.  3.  Genetic susceptibility to breast cancer (BRCA positive).    POSTOPERATIVE DIAGNOSES:  1.  Right breast implant infection.  2.  History of left breast cancer, status post bilateral mastectomy with   expander reconstruction.  3.  Genetic susceptibility to breast cancer (BRCA positive).    PROCEDURE:  Right breast implant removal.    SURGEON:  Niki Osuna MD    ASSISTANT:  None.    ANESTHESIA:  General.    ANESTHESIOLOGIST:  Davion Orozco MD    SPECIMEN:  None.    CULTURES:  Anaerobic, aerobic, Gram stain, sent of right breast fluid and   right breast tissue.    ESTIMATED BLOOD LOSS:  Minimal.    DRAINS:  15 round x1.    COMPLICATIONS:  None.    PROCEDURE IN DETAIL:  After satisfactory level of general anesthesia, the   patient's right breast was prepped and draped in a sterile manner.  Patient   had been marked preoperatively for removal of right breast expander.  All   incisions were marked out and discussed with the patient preoperatively.    An inframammary incision was made, which was taken down through subcutaneous   tissue.  The expander pocket was entered with cloudy fluid within the pocket.    This fluid was sent for culture.  The expander, which was not sutured in was   easily removed.  The expander was intact with a minimal amount of saline   inside.  There was fibrinous tissue attached to the implant, which was sent   separately for tissue culture.  The pocket was then irrigated with antibiotic   saline solution.  A drain was placed within the pocket being brought out   through a lateral scar in the inframammary crease.  The 15 round drain was   sutured in using 3-0 nylon suture.  The inframammary incision was then closed   using 3-0 Vicryl in the superficial fascial layer and 4-0 Monocryl was used to    close the intracuticular layer.  Steri-Strips were applied along with a dry   dressing.  A drain was placed to bulb suction with no air leaks.  Patient   tolerated the procedure well.  She will be transported to recovery where she   will be followed as an inpatient.       ____________________________________     DIOR GRANDE MD LMT / SHELLY    DD:  06/19/2018 07:55:13  DT:  06/19/2018 08:05:26    D#:  [id number]  Job#:  [id number]

## 2018-06-19 NOTE — OR SURGEON
Immediate Post OP Note    PreOp Diagnosis:   1. Right breast implant infection  2.  History of left breast cancer s/p bilateral mastectomy with expander reconstruction  3.  Genetic susceptibility to breast cancer (BRCA)    PostOp Diagnosis: same    Procedure(s):  BREAST IMPLANT REMOVAL    Surgeon(s):  Niki Osuna M.D.    Anesthesiologist/Type of Anesthesia:  No anesthesia staff entered./* No anesthesia type entered *    Surgical Staff:  Circulator: Radha Middleton R.N.  Scrub Person: Kassi Martinez    Specimens removed if any:  * No specimens in log *    Estimated Blood Loss: min    Findings:      Complications: none      [id number]  6/18/2018 8:08 PM Niki Osuna M.D.

## 2018-06-19 NOTE — PROGRESS NOTES
"AO  X 4, resting in bed.  Stated improved pain control with percocet.  Pain level dropped from 8/10 to 4/10 in R breast.  Patient stated \"I can live with a 4\".  Dressing in tact to R breast, ace wrap with fluff gauze, c/d/I.  DARA compressed, small serosanguinous drainage present. Plan of care discussed, questions answered, verbalized understanding.   "

## 2018-06-20 VITALS
BODY MASS INDEX: 35.53 KG/M2 | OXYGEN SATURATION: 93 % | WEIGHT: 208.11 LBS | HEIGHT: 64 IN | RESPIRATION RATE: 17 BRPM | TEMPERATURE: 97.5 F | DIASTOLIC BLOOD PRESSURE: 66 MMHG | SYSTOLIC BLOOD PRESSURE: 108 MMHG | HEART RATE: 68 BPM

## 2018-06-20 LAB
BACTERIA BLD CULT: NORMAL
SIGNIFICANT IND 70042: NORMAL
SITE SITE: NORMAL
SOURCE SOURCE: NORMAL

## 2018-06-20 PROCEDURE — A9270 NON-COVERED ITEM OR SERVICE: HCPCS | Performed by: INTERNAL MEDICINE

## 2018-06-20 PROCEDURE — A9270 NON-COVERED ITEM OR SERVICE: HCPCS | Performed by: HOSPITALIST

## 2018-06-20 PROCEDURE — 700105 HCHG RX REV CODE 258: Performed by: HOSPITALIST

## 2018-06-20 PROCEDURE — A9270 NON-COVERED ITEM OR SERVICE: HCPCS | Performed by: SURGERY

## 2018-06-20 PROCEDURE — 700102 HCHG RX REV CODE 250 W/ 637 OVERRIDE(OP): Performed by: SURGERY

## 2018-06-20 PROCEDURE — 99232 SBSQ HOSP IP/OBS MODERATE 35: CPT | Performed by: INTERNAL MEDICINE

## 2018-06-20 PROCEDURE — 99239 HOSP IP/OBS DSCHRG MGMT >30: CPT | Performed by: INTERNAL MEDICINE

## 2018-06-20 PROCEDURE — 700102 HCHG RX REV CODE 250 W/ 637 OVERRIDE(OP): Performed by: HOSPITALIST

## 2018-06-20 PROCEDURE — 700102 HCHG RX REV CODE 250 W/ 637 OVERRIDE(OP): Performed by: INTERNAL MEDICINE

## 2018-06-20 PROCEDURE — 700111 HCHG RX REV CODE 636 W/ 250 OVERRIDE (IP): Performed by: HOSPITALIST

## 2018-06-20 RX ORDER — OXYCODONE AND ACETAMINOPHEN 10; 325 MG/1; MG/1
1 TABLET ORAL EVERY 6 HOURS PRN
Qty: 30 TAB | Refills: 0 | Status: SHIPPED | OUTPATIENT
Start: 2018-06-20 | End: 2018-06-27

## 2018-06-20 RX ORDER — LINEZOLID 600 MG/1
600 TABLET, FILM COATED ORAL EVERY 12 HOURS
Qty: 23 TAB | Refills: 0 | Status: SHIPPED | OUTPATIENT
Start: 2018-06-20 | End: 2018-11-05

## 2018-06-20 RX ORDER — CIPROFLOXACIN 500 MG/1
500 TABLET, FILM COATED ORAL 2 TIMES DAILY
Qty: 23 TAB | Refills: 0 | Status: SHIPPED | OUTPATIENT
Start: 2018-06-20 | End: 2018-11-05

## 2018-06-20 RX ORDER — FERROUS SULFATE 325(65) MG
325 TABLET ORAL
Qty: 30 TAB | Refills: 12 | Status: SHIPPED | OUTPATIENT
Start: 2018-06-21 | End: 2020-02-24

## 2018-06-20 RX ADMIN — FERROUS SULFATE TAB 325 MG (65 MG ELEMENTAL FE) 325 MG: 325 (65 FE) TAB at 08:20

## 2018-06-20 RX ADMIN — LINEZOLID 600 MG: 600 TABLET, FILM COATED ORAL at 08:22

## 2018-06-20 RX ADMIN — OXYCODONE HYDROCHLORIDE AND ACETAMINOPHEN 1 TABLET: 10; 325 TABLET ORAL at 06:42

## 2018-06-20 RX ADMIN — ENOXAPARIN SODIUM 40 MG: 100 INJECTION SUBCUTANEOUS at 08:33

## 2018-06-20 RX ADMIN — THERA TABS 1 TABLET: TAB at 08:22

## 2018-06-20 RX ADMIN — DOCUSATE SODIUM AND SENNOSIDES 2 TABLET: 8.6; 5 TABLET, FILM COATED ORAL at 08:22

## 2018-06-20 RX ADMIN — OXYCODONE HYDROCHLORIDE AND ACETAMINOPHEN 1 TABLET: 10; 325 TABLET ORAL at 12:44

## 2018-06-20 RX ADMIN — PIPERACILLIN AND TAZOBACTAM 4.5 G: 4; .5 INJECTION, POWDER, LYOPHILIZED, FOR SOLUTION INTRAVENOUS; PARENTERAL at 06:39

## 2018-06-20 RX ADMIN — OXYCODONE HYDROCHLORIDE AND ACETAMINOPHEN 500 MG: 500 TABLET ORAL at 08:20

## 2018-06-20 ASSESSMENT — PAIN SCALES - GENERAL
PAINLEVEL_OUTOF10: 7
PAINLEVEL_OUTOF10: 7
PAINLEVEL_OUTOF10: 4

## 2018-06-20 ASSESSMENT — ENCOUNTER SYMPTOMS
SPEECH CHANGE: 0
VOMITING: 0
ABDOMINAL PAIN: 0
NAUSEA: 0
CHILLS: 0
SENSORY CHANGE: 0
COUGH: 0
MYALGIAS: 1
FEVER: 0
DIARRHEA: 0

## 2018-06-20 NOTE — PROGRESS NOTES
[facility] Hospitalist Progress Note    Date of Service: 2018    Chief Complaint  38 y.o. female with history of BRCA mutation and left breast cancer, status post neoadjuvant chemo, bilateral mastectomy April, removal of left breast tissue expander in May due to Pseudomonas admitted 6/15/2018 with redness and swelling in her right breast.  Tissue expander removed by Dr. Osuna. Patient being followed by infectious disease    Interval Problem Update  Reviewed care plan  Zyvox prescription faxed to her pharmacy  Cultures NGTD    Consultants/Specialty  Plastic surgery  Infectious disease    Disposition  Home        Review of Systems   Constitutional: Negative for chills, fever and malaise/fatigue.   HENT: Negative for hearing loss and sore throat.    Eyes: Negative for blurred vision.   Respiratory: Negative for cough and shortness of breath.    Cardiovascular: Positive for chest pain (just at breast surgery site).   Gastrointestinal: Negative for abdominal pain, nausea and vomiting.   Genitourinary: Negative for dysuria.   Musculoskeletal: Negative for myalgias.   Skin: Negative for itching and rash.   Neurological: Negative for dizziness.   Psychiatric/Behavioral: Negative for depression. The patient is not nervous/anxious.       Physical Exam  Laboratory/Imaging   Hemodynamics  Temp (24hrs), Av °C (98.6 °F), Min:36.5 °C (97.7 °F), Max:37.6 °C (99.7 °F)   Temperature: 36.7 °C (98.1 °F)  Pulse  Av.7  Min: 65  Max: 93    Blood Pressure: 106/70, NIBP: 123/71      Respiratory      Respiration: 17, Pulse Oximetry: 92 %             Fluids    Intake/Output Summary (Last 24 hours) at 18 1900  Last data filed at 18 1445   Gross per 24 hour   Intake              920 ml   Output               45 ml   Net              875 ml       Nutrition  Orders Placed This Encounter   Procedures   • DIET ORDER     Standing Status:   Standing     Number of Occurrences:   1     Order Specific Question:   Diet:      Answer:   Regular [1]     Physical Exam   Constitutional: She is oriented to person, place, and time. She appears well-developed and well-nourished. No distress.   HENT:   Head: Normocephalic and atraumatic.   Mouth/Throat: Oropharynx is clear and moist.   Eyes: EOM are normal. Pupils are equal, round, and reactive to light. Right eye exhibits no discharge. Left eye exhibits no discharge.   Neck: Neck supple.   Cardiovascular: Normal rate and regular rhythm.    Pulmonary/Chest: Effort normal and breath sounds normal.   Ace wrap around chest wall   Abdominal: Soft. Bowel sounds are normal. She exhibits no distension. There is no tenderness.   Musculoskeletal: She exhibits no edema or tenderness.   Neurological: She is alert and oriented to person, place, and time. No cranial nerve deficit.   Skin: Skin is warm and dry. She is not diaphoretic.   Unable to view wounds as dressing was placed by plastics   Psychiatric: She has a normal mood and affect.   Nursing note and vitals reviewed.      Recent Labs      06/17/18 0431 06/18/18 0222 06/19/18   0534   WBC  5.1  6.0  5.8   RBC  3.57*  3.58*  3.53*   HEMOGLOBIN  10.1*  10.2*  10.0*   HEMATOCRIT  31.9*  31.7*  31.4*   MCV  89.4  88.5  89.0   MCH  28.3  28.5  28.3   MCHC  31.7*  32.2*  31.8*   RDW  46.2  45.5  46.5   PLATELETCT  328  358  391   MPV  8.7*  8.9*  8.7*     Recent Labs      06/17/18   0431  06/18/18 0222  06/19/18   0534   SODIUM  140  137  138   POTASSIUM  4.1  3.9  4.0   CHLORIDE  108  104  105   CO2  23  23  24   GLUCOSE  91  112*  90   BUN  8  10  11   CREATININE  0.78  0.77  0.80   CALCIUM  8.9  9.3  9.0                      Assessment/Plan     Infected breast tissue expander (HCC)- (present on admission)   Assessment & Plan    Patient underwent bilateral mastectomy after being diagnosed with left sided breast cancer last fall  She had neoadjuvant chemo, had prophylactic right mastectomy and bilateral tissue expanders placed.  She had prior L  tissue expander removed due to Pseudomonas infection  Presented with infection in the right breast, right tissue expander has been removed  She is on empiric antibiotics and cultures are NGTD          Breast cancer (HCC)- (present on admission)   Assessment & Plan    Left status post neoadjuvant chemo, mastectomy        BRCA gene mutation positive in female- (present on admission)   Assessment & Plan    He shouldn't plans future GYN surgery once she has recovered from her breast procedures        Generalized anxiety disorder- (present on admission)   Assessment & Plan    As needed medications for anxiety          Quality-Core Measures   Reviewed items::  Labs reviewed and Medications reviewed  Casey catheter::  No Csaey  DVT prophylaxis pharmacological::  Enoxaparin (Lovenox)  Ulcer Prophylaxis: stopped PPI due to C. diff risk, treat symptoms only.  Antibiotics:  Treating active infection/contamination beyond 24 hours perioperative coverage  Assessed for rehabilitation services:  Patient returned to prior level of function, rehabilitation not indicated at this time

## 2018-06-20 NOTE — PROGRESS NOTES
AO x 4, new fluff gauze for R breast incision placed in ace wrap around chest.  ABD pad placed over old L breast incison. Both with scant serosanguinous drainage. No redness or odor. Pain level 4/10, comfortable per patient. Sitting up eating am meal.  Afebrile.  Antibiotics per mar. Plan of care discussed, questions answered, verbalized understanding.

## 2018-06-20 NOTE — PROGRESS NOTES
Discharging Patient home per physician order.  Discharged with self in uber.  Demonstrated understanding of discharge instructions, follow up appointments, home medications, prescriptions, home care for surgical wound, and nursing care instructions for incision and drainage aftercare, DARA home care and antibiotics.  Ambulating without assistance, voiding without difficulty, pain well controlled, tolerating oral medications, oxygen saturation greater than 90% , tolerating diet.   Discussed home meds and next time doses are to be taken. Educational handouts given and discussed.  Verbalized understanding of discharge instructions and educational handouts.  Demonstrated understanding of  home care.  Able to state several reason why to seek medical care, call MD, or get to the ED.  All questions answered.  Belongings with patient at time of discharge.

## 2018-06-20 NOTE — DISCHARGE PLANNING
Anticipated Discharge Disposition: Zyvox    Action: [place] with [facility] notified LSW, the Zyvox is approved and it does not require insurance prior authorization.    Barriers to Discharge: None.    Plan: Home, pending medical clearance.

## 2018-06-20 NOTE — PROGRESS NOTES
Infectious Disease Progress Note    Author: Arianna Casper M.D. Date & Time of service: 2018  10:57 AM    Chief Complaint:  FU Right Breast cellulitis    Interval History:  2018 MAXIMUM TEMPERATURE 99.6 WBC 5.1 platelets 328 creatinine 0.78   AF WBC 6 does not think erythema is improving, questions about removal of expander   AF WBC 5.8 s/p expander removal yesterday, feels well with no new issues, tolerating abx without side effects   AF no CBC, feeling well, R breast site examine with hospitalist  Labs Reviewed, Medications Reviewed, Radiology Reviewed and Wound Reviewed.    Review of Systems:  Review of Systems   Constitutional: Negative for chills, fever and malaise/fatigue.   HENT: Negative for hearing loss.    Respiratory: Negative for cough.    Cardiovascular: Negative for chest pain.   Gastrointestinal: Negative for abdominal pain, diarrhea, nausea and vomiting.   Genitourinary: Negative for dysuria.   Musculoskeletal: Positive for myalgias.        Right breast pain - controlled   Neurological: Negative for sensory change and speech change.       Hemodynamics:  Temp (24hrs), Av.6 °C (97.8 °F), Min:36.2 °C (97.1 °F), Max:36.8 °C (98.2 °F)  Temperature: 36.4 °C (97.5 °F)  Pulse  Av  Min: 63  Max: 93   Blood Pressure: 108/66       Physical Exam:  Physical Exam   Constitutional: She is oriented to person, place, and time. She appears well-developed and well-nourished. No distress.   HENT:   Head: Normocephalic and atraumatic.   Mouth/Throat: No oropharyngeal exudate.   Eyes: EOM are normal. Pupils are equal, round, and reactive to light. No scleral icterus.   Neck: Neck supple.   Cardiovascular: Normal rate and regular rhythm.    No murmur heard.  Pulmonary/Chest: Effort normal. She has no wheezes. She has no rales.   Left breast has scarring but no erythema. +small scabbed lesion    R breast with decreased erythema, there remains some mild edema  +DARA drain with serosang fluid    Abdominal: Soft. There is no tenderness.   Neurological: She is alert and oriented to person, place, and time. No cranial nerve deficit. Coordination normal.   Skin: There is erythema.   Vitals reviewed.      Meds:    Current Facility-Administered Medications:   •  linezolid  •  oxyCODONE-acetaminophen  •  ferrous sulfate  •  sucralfate  •  mag hydrox-al hydrox-simeth  •  senna-docusate **AND** polyethylene glycol/lytes **AND** magnesium hydroxide **AND** bisacodyl  •  ondansetron  •  ondansetron  •  promethazine  •  promethazine  •  prochlorperazine  •  acetaminophen  •  Notify provider if pain remains uncontrolled **AND** Use the numeric rating scale (NRS-11) on regular floors and Critical-Care Pain Observation Tool (CPOT) on ICUs/Trauma to assess pain **AND** Pulse Ox (Oximetry) **AND** Pharmacy Consult Request **AND** If patient difficult to arouse and/or has respiratory depression, stop any opiates that are currently infusing and call a Rapid Response. **AND** [DISCONTINUED] oxyCODONE immediate-release **AND** [DISCONTINUED] oxyCODONE immediate-release **AND** morphine injection  •  [COMPLETED] piperacillin-tazobactam **AND** piperacillin-tazobactam  •  diphenhydrAMINE  •  ALPRAZolam  •  ascorbic acid  •  multivitamin  •  enoxaparin (LOVENOX) injection    Labs:  Recent Labs      06/18/18 0222 06/19/18   0534   WBC  6.0  5.8   RBC  3.58*  3.53*   HEMOGLOBIN  10.2*  10.0*   HEMATOCRIT  31.7*  31.4*   MCV  88.5  89.0   MCH  28.5  28.3   RDW  45.5  46.5   PLATELETCT  358  391   MPV  8.9*  8.7*   NEUTSPOLYS  57.90  49.50   LYMPHOCYTES  28.00  37.70   MONOCYTES  8.70  8.00   EOSINOPHILS  4.20  4.00   BASOPHILS  0.50  0.50     Recent Labs      06/18/18 0222 06/19/18   0534   SODIUM  137  138   POTASSIUM  3.9  4.0   CHLORIDE  104  105   CO2  23  24   GLUCOSE  112*  90   BUN  10  11     Recent Labs      06/18/18 0222 06/19/18   0534   CREATININE  0.77  0.80       Imaging:  Us-breast Limited-right    Result  Date: 6/15/2018  6/15/2018 1:31 PM HISTORY/REASON FOR EXAM: Status post mastectomy reconstruction and breast expander. Inflammation. Possible cellulitis. Possible abscess. TECHNIQUE/EXAM DESCRIPTION AND NUMBER OF VIEWS: Right breast ultrasound. COMPARISON:   None FINDINGS:      Postoperative changes involving the right breast. Artifact consistent with expander. Soft tissue edema is identified consistent with inflammation possibly cellulitis. No organized fluid collection consistent with abscess identified.     Postoperative changes right breast. Soft tissue edema consistent with inflammation. No drainable abscess identified. These results were given to the patient at the time of visit.       Micro:  Results     Procedure Component Value Units Date/Time    ANAEROBIC CULTURE [[id number]] Collected:  06/18/18 2031    Order Status:  Completed Specimen:  Wound Updated:  06/19/18 1340     Significant Indicator NEG     Source WND     Site Right Breast     Anaerobic Culture, Culture Res Culture in progress.    CULTURE WOUND W/ GRAM STAIN [[id number]] Collected:  06/18/18 2036    Order Status:  Completed Specimen:  Wound Updated:  06/19/18 1340     Significant Indicator NEG     Source WND     Site Right Breast     Culture Result Wound No growth at 24 hours.     Gram Stain Result Few WBCs.  No organisms seen.      ANAEROBIC CULTURE [[id number]] Collected:  06/18/18 2036    Order Status:  Completed Specimen:  Wound Updated:  06/19/18 1340     Significant Indicator NEG     Source WND     Site Right Breast     Anaerobic Culture, Culture Res Culture in progress.    ANAEROBIC CULTURE [[id number]] Collected:  06/18/18 2036    Order Status:  Completed Specimen:  Tissue Updated:  06/19/18 1340     Significant Indicator NEG     Source TISS     Site Right Breast Tissue     Anaerobic Culture, Culture Res Culture in progress.    CULTURE TISSUE W/ GRM STAIN [[id number]] Collected:  06/18/18 2036    Order Status:  Completed Specimen:  Tissue  "Updated:  06/19/18 1340     Significant Indicator NEG     Source TISS     Site Right Breast Tissue     Tissue Culture No growth at 24 hours.     Gram Stain Result No organisms seen.    CULTURE WOUND W/ GRAM STAIN [[id number]] Collected:  06/18/18 2031    Order Status:  Completed Specimen:  Wound Updated:  06/19/18 1340     Significant Indicator NEG     Source WND     Site Right Breast     Culture Result Wound No growth at 24 hours.     Gram Stain Result Few WBCs.  No organisms seen.      GRAM STAIN [[id number]] Collected:  06/18/18 2036    Order Status:  Completed Specimen:  Tissue Updated:  06/19/18 1120     Significant Indicator .     Source TISS     Site Right Breast Tissue     Gram Stain Result No organisms seen.    GRAM STAIN [[id number]] Collected:  06/18/18 2036    Order Status:  Completed Specimen:  Wound Updated:  06/19/18 1119     Significant Indicator .     Source WND     Site Right Breast     Gram Stain Result Few WBCs.  No organisms seen.      GRAM STAIN [[id number]] Collected:  06/18/18 2031    Order Status:  Completed Specimen:  Wound Updated:  06/19/18 1119     Significant Indicator .     Source WND     Site Right Breast     Gram Stain Result Few WBCs.  No organisms seen.      BLOOD CULTURE [[id number]] Collected:  06/18/18 1144    Order Status:  Completed Specimen:  Blood from Peripheral Updated:  06/19/18 1017     Significant Indicator NEG     Source BLD     Site PERIPHERAL     Blood Culture No Growth    Note: Blood cultures are incubated for 5 days and  are monitored continuously.Positive blood cultures  are called to the RN and reported as soon as  they are identified.      Narrative:       Per Hospital Policy: Only change Specimen Src: to \"Line\" if  specified by physician order.    BLOOD CULTURE [[id number]] Collected:  06/18/18 1139    Order Status:  Completed Specimen:  Blood Updated:  06/19/18 1017     Significant Indicator NEG     Source BLD     Site Peripheral     Blood Culture No Growth    Note: " "Blood cultures are incubated for 5 days and  are monitored continuously.Positive blood cultures  are called to the RN and reported as soon as  they are identified.      BLOOD CULTURE [[id number]]  (Abnormal) Collected:  06/15/18 1315    Order Status:  Completed Specimen:  Blood from Peripheral Updated:  06/18/18 1212     Significant Indicator POS (POS)     Source BLD     Site PERIPHERAL     Blood Culture Growth detected by Bactec instrument. 16:25  Gram positive cocci: Possible Staphylococcus sp.  Negative for Staphylococcus aureus and MRSA by PCR. Correlate  ongoing need for antibiotics with clinical condition.   (A)      Coagulase Negative Staphylococcus-possible contaminant  Isolated from one bottle only, possible skin contaminant  please correlate with clinical condition.   (A)    Narrative:       CALL  [facility]  141 tel. [phone],  CALLED  141 tel. [phone] 06/17/2018, 18:16, RB PERF. RESULTS CALLED TO:RN  [id number] and Lft Message Milagro Fengelian  2 of 2 blood culture x2  Sites order. Per Hospital Policy:  Only change Specimen Src: to \"Line\" if specified by physician  order.    BLOOD CULTURE [[id number]] Collected:  06/15/18 1254    Order Status:  Completed Specimen:  Blood from Peripheral Updated:  06/16/18 0739     Significant Indicator NEG     Source BLD     Site PERIPHERAL     Blood Culture No Growth    Note: Blood cultures are incubated for 5 days and  are monitored continuously.Positive blood cultures  are called to the RN and reported as soon as  they are identified.      Narrative:       1 of 2 for Blood Culture x 2 sites order. Per Hospital  Policy: Only change Specimen Src: to \"Line\" if specified by  physician order.          Assessment:  Active Hospital Problems    Diagnosis   • Cellulitis [L03.90]   • Breast cancer ([facility]) [C50.919]   • Generalized anxiety disorder [F41.1]   • Morbid obesity ([facility]) [E66.01]       Plan:  Right breast cellulitis  Patient is status post mastectomy and expander in place  Failed " outpatient bactrim  The ultrasound done on 6/15/2018 did not show any abscess  S/p removal of expander on 6/18  OR cultures - NGTD (on abx prior)  Plan on 2 week course total  Continue Zosyn and Zyvox   Transition to PO linezolid and cipro at discharge  Stop date 07/01/18  Please ensure pt can obtain linezolid as an outpatient  Pt to go home with drain    Positive blood culture  Likely contaminant  Bcx (1/2) 6/15 - CoNS   Repeat BCx 6/18 - NGTD    Left breast implant infection  Status post removal of the implant on 5/16/2018  Cultures were Pseudomonas    Breast CA  Patient is for radiation    OK to DC patient from ID standpoint    FU with surgeon    FU ID clinic    Discussed with internal medicine/Dr Joe. ID signing off.

## 2018-06-20 NOTE — DISCHARGE INSTRUCTIONS
Discharge Instructions    Discharged to home by taxi (uber) with self. Discharged via wheelchair, hospital escort: Yes.  Special equipment needed: Not Applicable    Be sure to schedule a follow-up appointment with your primary care doctor or any specialists as instructed.     Discharge Plan:   Diet Plan: Discussed  Activity Level: Discussed  Smoking Cessation Offered: Patient Refused  Confirmed Follow up Appointment: Patient to Call and Schedule Appointment  Confirmed Symptoms Management: Discussed  Medication Reconciliation Updated: Yes  Influenza Vaccine Indication: Patient Refuses    I understand that a diet low in cholesterol, fat, and sodium is recommended for good health. Unless I have been given specific instructions below for another diet, I accept this instruction as my diet prescription.   Other diet: as tolerated    Special Instructions: None    · Is patient discharged on Warfarin / Coumadin?   No     Depression / Suicide Risk    As you are discharged from this [facility] facility, it is important to learn how to keep safe from harming yourself.    Recognize the warning signs:  · Abrupt changes in personality, positive or negative- including increase in energy   · Giving away possessions  · Change in eating patterns- significant weight changes-  positive or negative  · Change in sleeping patterns- unable to sleep or sleeping all the time   · Unwillingness or inability to communicate  · Depression  · Unusual sadness, discouragement and loneliness  · Talk of wanting to die  · Neglect of personal appearance   · Rebelliousness- reckless behavior  · Withdrawal from people/activities they love  · Confusion- inability to concentrate     If you or a loved one observes any of these behaviors or has concerns about self-harm, here's what you can do:  · Talk about it- your feelings and reasons for harming yourself  · Remove any means that you might use to hurt yourself (examples: pills, rope, extension cords,  firearm)  · Get professional help from the community (Mental Health, Substance Abuse, psychological counseling)  · Do not be alone:Call your Safe Contact- someone whom you trust who will be there for you.  · Call your local CRISIS HOTLINE 648-5512 or 959-956-3417  · Call your local Children's Mobile Crisis Response Team [place] (542) 033-6996 or www.[employer]  · Call the toll free National Suicide Prevention Hotlines   · National Suicide Prevention Lifeline 893-402-RZWN (3000)  · [employer] Hope [employer] Network 800-SUICIDE (987-6286)          Sponge bath. May wear ACE wrap or a surgical bra.  Call for a follow up appointment in one week. Keep incision clean and dry.       Bulb Drain Home Care  A bulb drain consists of a thin rubber tube and a soft, round bulb that creates a gentle suction. The rubber tube is placed in the area where you had surgery. A bulb is attached to the end of the tube that is outside the body. The bulb drain removes excess fluid that normally builds up in a surgical wound after surgery. The color and amount of fluid will vary. Immediately after surgery, the fluid is bright red and is a little thicker than water. It may gradually change to a yellow or pink color and become more thin and water-like. When the amount decreases to about 1 or 2 tbsp in 24 hours, your health care provider will usually remove it.  DAILY CARE  · Keep the bulb flat (compressed) at all times, except while emptying it. The flatness creates suction. You can flatten the bulb by squeezing it firmly in the middle and then closing the cap.  · Keep sites where the tube enters the skin dry and covered with a bandage (dressing).  · Secure the tube 1-2 in (2.5-5.1 cm) below the insertion sites to keep it from pulling on your stitches. The tube is stitched in place and will not slip out.  · Secure the bulb as directed by your health care provider.  · For the first 3 days after surgery, there usually is more fluid in the  bulb. Empty the bulb whenever it becomes half full because the bulb does not create enough suction if it is too full. The bulb could also overflow. Write down how much fluid you remove each time you empty your drain. Add up the amount removed in 24 hours.  · Empty the bulb at the same time every day once the amount of fluid decreases and you only need to empty it once a day. Write down the amounts and the 24-hour totals to give to your health care provider. This helps your health care provider know when the tubes can be removed.  EMPTYING THE BULB DRAIN  Before emptying the bulb, get a measuring cup, a piece of paper and a pen, and wash your hands.  · Gently run your fingers down the tube (stripping) to empty any drainage from the tubing into the bulb. This may need to be done several times a day to clear the tubing of clots and tissue.  · Open the bulb cap to release suction, which causes it to inflate. Do not touch the inside of the cap.  · Gently run your fingers down the tube (stripping) to empty any drainage from the tubing into the bulb.  · Hold the cap out of the way, and pour fluid into the measuring cup.    · Squeeze the bulb to provide suction.   · Replace the cap.    · Check the tape that holds the tube to your skin. If it is becoming loose, you can remove the loose piece of tape and apply a new one. Then, pin the bulb to your shirt.    · Write down the amount of fluid you emptied out. Write down the date and each time you emptied your bulb drain. (If there are 2 bulbs, note the amount of drainage from each bulb and keep the totals separate. Your health care provider will want to know the total amounts for each drain and which tube is draining more.)    · Flush the fluid down the toilet and wash your hands.    · Call your health care provider once you have less than 2 tbsp of fluid collecting in the bulb drain every 24 hours.  If there is drainage around the tube site, change dressings and keep the area  dry. Cleanse around tube with sterile saline and place dry gauze around site. This gauze should be changed when it is soiled. If it stays clean and unsoiled, it should still be changed daily.   SEEK MEDICAL CARE IF:  · Your drainage has a bad smell or is cloudy.    · You have a fever.    · Your drainage is increasing instead of decreasing.    · Your tube fell out.    · You have redness or swelling around the tube site.    · You have drainage from a surgical wound.    · Your bulb drain will not stay flat after you empty it.    MAKE SURE YOU:   · Understand these instructions.  · Will watch your condition.  · Will get help right away if you are not doing well or get worse.     This information is not intended to replace advice given to you by your health care provider. Make sure you discuss any questions you have with your health care provider.     Document Released: 12/15/2001 Document Revised: 01/08/2016 Document Reviewed: 05/22/2013  [employer] Interactive Patient Education ©2016 [employer]    Incision and Drainage, Care After  Refer to this sheet in the next few weeks. These instructions provide you with information on caring for yourself after your procedure. Your caregiver may also give you more specific instructions. Your treatment has been planned according to current medical practices, but problems sometimes occur. Call your caregiver if you have any problems or questions after your procedure.  HOME CARE INSTRUCTIONS   · If antibiotic medicine is given, take it as directed. Finish it even if you start to feel better.  · Only take over-the-counter or prescription medicines for pain, discomfort, or fever as directed by your caregiver.  · Keep all follow-up appointments as directed by your caregiver.  · Change any bandages (dressings) as directed by your caregiver. Replace old dressings with clean dressings.  · Wash your hands before and after caring for your wound.  You will receive specific instructions for  cleansing and caring for your wound.   SEEK MEDICAL CARE IF:   · You have increased pain, swelling, or redness around the wound.  · You have increased drainage, smell, or bleeding from the wound.  · You have muscle aches, chills, or you feel generally sick.  · You have a fever.  MAKE SURE YOU:   · Understand these instructions.  · Will watch your condition.  · Will get help right away if you are not doing well or get worse.     This information is not intended to replace advice given to you by your health care provider. Make sure you discuss any questions you have with your health care provider.     Document Released: 03/11/2013 Document Revised: 01/08/2016 Document Reviewed: 03/11/2013  [employer] Interactive Patient Education ©2016 [employer]      Antibiotic Medicine  Antibiotic medicines are used to treat infections caused by bacteria. They work by injuring or killing the bacteria that is making you sick.  HOW IS AN ANTIBIOTIC CHOSEN?  An antibiotic is chosen based on many factors. To help your health care provider choose one for you, tell your health care provider if:  · You have any allergies.  · You are pregnant or plan to get pregnant.  · You are breastfeeding.  · You are taking any medicines. These include over-the-counter medicines, prescription medicines, and herbal remedies.  · You have a medical condition or problem you have not already discussed.  Your health care provider will also consider:  · How often the medicine has to be taken.  · Common side effects of the medicine.  · The cost of the medicine.  · The taste of the medicine.  If you have questions about why an antibiotic was chosen, make sure to ask.  FOR HOW LONG SHOULD I TAKE MY ANTIBIOTIC?  Continue to take your antibiotic for as long as told by your health care provider. Do not stop taking it when you feel better. If you stop taking it too soon:  · You may start to feel sick again.  · Your infection may become harder to  treat.  · Complications may develop.  WHAT IF I MISS A DOSE?  Try not to miss any doses of medicine. If you miss a dose, take it as soon as possible. However, if it is almost time for the next dose:  · If you are taking 2 doses per day, take the missed dose and the next dose 5 to 6 hours apart.  · If you are taking 3 or more doses per day, take the missed dose and the next dose 2 to 4 hours apart, then go back to the normal schedule.  If you cannot make up a missed dose, take the next scheduled dose on time. Then take the missed dose after you have taken all the doses as recommended by your health care provider, as if you had one more dose left.  DO ANTIBIOTICS AFFECT BIRTH CONTROL?  Birth control pills may not work while you are on antibiotics. If you are taking birth control pills, continue taking them as usual and use a second form of birth control, such as a condom, to avoid unwanted pregnancy. Continue using the second form of birth control until you are finished with your current 1 month cycle of birth control pills.  OTHER INFORMATION  · If there is any medicine left over, throw it away.  · Never take someone else's antibiotics.  · Never take leftover antibiotics.  SEEK MEDICAL CARE IF:  · You get worse.  · You do not feel better within a few days of starting the antibiotic medicine.  · You vomit.  · White patches appear in your mouth.  · You have new joint pain that begins after starting the antibiotic.  · You have new muscle aches that begin after starting the antibiotic.  · You had a fever before starting the antibiotic and it returns.  · You have any symptoms of an allergic reaction, such as an itchy rash. If this happens, stop taking the antibiotic.  SEEK IMMEDIATE MEDICAL CARE IF:  · Your urine turns dark or becomes blood-colored.  · Your skin turns yellow.  · You bruise or bleed easily.  · You have severe diarrhea and abdominal cramps.  · You have a severe headache.  · You have signs of a severe  allergic reaction, such as:  ¨ Trouble breathing.  ¨ Wheezing.  ¨ Swelling of the lips, tongue, or face.  ¨ Fainting.  ¨ Blisters on the skin or in the mouth.  If you have signs of a severe allergic reaction, stop taking the antibiotic right away.  This information is not intended to replace advice given to you by your health care provider. Make sure you discuss any questions you have with your health care provider.  Document Released: 08/30/2005 Document Revised: 09/07/2016 Document Reviewed: 05/04/2016  [employer]vier Interactive Patient Education © 2017 [employer] Inc.

## 2018-06-20 NOTE — PROGRESS NOTES
Assessment complete.  AA&Ox4. VSS on RA. Denies SOB.  Pain tolerable at this time. Agreeable to take PO pain med when next available.  Ace wrap to chest. CDI.  R breast DARA drain compressed to self suction. Draining SS output.  Tolerating regular diet. Denies N/V.  + void. + BM.   Pt is upself with steady gait.   All needs met at this time. Call light within reach. Pt calls appropriately.

## 2018-06-21 NOTE — DISCHARGE SUMMARY
Discharge Summary    CHIEF COMPLAINT ON ADMISSION  Chief Complaint   Patient presents with   • Breast Pain     R side. Bilateral breast mastectomy April 12th, 2018   • Breast Swelling       Reason for Admission  Other     Admission Date  6/15/2018    CODE STATUS  Prior    HPI & HOSPITAL COURSE  38 y.o. female with history of BRCA mutation and left breast cancer, status post neoadjuvant chemo, bilateral mastectomy April, removal of left breast tissue expander in May due to Pseudomonas admitted 6/15/2018 with redness and swelling in her right breast.  Tissue expander removed by Dr. Osuna. Patient was seen by infectious disease. Cultures however showed no significant growth. Culture from 6/18 however is pending/NGTD at the time of discharge. However all other cultures have finalized as negative.    Patient had DARA drains residual in place at the time of discharge and will follow-up with Dr. Osuna for removal. She had a tattooing appointment for planned radiation therapy the date of discharge. Dr. Casper and I explained to the patient that she may not begin RT until her  Infection has resolved otherwise she may develop nonhealing areas.        Therefore, she is discharged in good and stable condition to home with close outpatient follow-up.    The patient met 2-midnight criteria for an inpatient stay at the time of discharge.    Discharge Date  6/20/2018    FOLLOW UP ITEMS POST DISCHARGE  Plastic surgery- Dr. Osuna  Radiation oncology    DISCHARGE DIAGNOSES  Active Problems:    Infected breast tissue expander (HCC) POA: Yes    Breast cancer (HCC) POA: Yes    Generalized anxiety disorder POA: Yes    BRCA gene mutation positive in female POA: Yes  Resolved Problems:    * No resolved hospital problems. *      FOLLOW UP  Future Appointments  Date Time Provider Department Center   6/26/2018 9:30 AM Alvaro Vasquez M.D. [facility]     Niki Osuna M.D.  [address] [place] [address]  [id number] [phone]    In 1 week      Arianna Casper M.D.  [address] [place] [address]  [address]  [place] [address] [phone]    In 1 week  call for follow up information    Pcp Pt States None            MEDICATIONS ON DISCHARGE     Medication List      START taking these medications      Instructions   ciprofloxacin 500 MG Tabs  Commonly known as:  CIPRO   Take 1 Tab by mouth 2 times a day.  Dose:  500 mg     ferrous sulfate 325 (65 Fe) MG tablet   Take 1 Tab by mouth every morning with breakfast.  Dose:  325 mg     linezolid 600 MG Tabs  Commonly known as:  ZYVOX   Take 1 Tab by mouth every 12 hours.  Dose:  600 mg     oxyCODONE-acetaminophen  MG Tabs  Commonly known as:  PERCOCET-10   Take 1 Tab by mouth every 6 hours as needed for Severe Pain for up to 7 days.  Dose:  1 Tab        CONTINUE taking these medications      Instructions   ALPRAZolam 0.25 MG Tabs  Commonly known as:  XANAX   Take 0.25 mg by mouth at bedtime as needed for Sleep.  Dose:  0.25 mg     ascorbic acid 500 MG Tabs  Commonly known as:  ascorbic acid   Take 500 mg by mouth every day.  Dose:  500 mg     MELATONIN PO   Take 1 Tab by mouth every day.  Dose:  1 Tab     multivitamin Tabs   Take 1 Tab by mouth every day.  Dose:  1 Tab     TURMERIC CURCUMIN PO   Take 1 Tab by mouth every day.  Dose:  1 Tab     vitamin D (Ergocalciferol) 77004 units Caps capsule  Commonly known as:  DRISDOL   Take 50,000 Units by mouth every 7 days.  Dose:  68167 Units        STOP taking these medications    ibuprofen 200 MG Tabs  Commonly known as:  MOTRIN     omeprazole 20 MG delayed-release capsule  Commonly known as:  PRILOSEC     sulfamethoxazole-trimethoprim 800-160 MG tablet  Commonly known as:  BACTRIM DS            Allergies  No Known Allergies    DIET  regular    ACTIVITY  Areas clean and dry, DARA drain care as instructed.  No tub soaks    CONSULTATIONS  Plastic surgery- Dr Enrrique Dee infectious disease    PROCEDURES  DATE OF SERVICE:   06/18/2018     PREOPERATIVE DIAGNOSES:  1.  Right breast implant infection.  2.  History of left breast cancer, status post bilateral mastectomy with   expander reconstruction.  3.  Genetic susceptibility to breast cancer (BRCA positive).     POSTOPERATIVE DIAGNOSES:  1.  Right breast implant infection.  2.  History of left breast cancer, status post bilateral mastectomy with   expander reconstruction.  3.  Genetic susceptibility to breast cancer (BRCA positive).     PROCEDURE:  Right breast implant removal.     SURGEON:  Niki Osuna MD     ASSISTANT:  None.     ANESTHESIA:  General.     ANESTHESIOLOGIST:  Davion Orozco MD     SPECIMEN:  None.     CULTURES:  Anaerobic, aerobic, Gram stain, sent of right breast fluid and   right breast tissue.     ESTIMATED BLOOD LOSS:  Minimal.     DRAINS:  15 round x1.     COMPLICATIONS:  None.     PROCEDURE IN DETAIL:  After satisfactory level of general anesthesia, the   patient's right breast was prepped and draped in a sterile manner.  Patient   had been marked preoperatively for removal of right breast expander.  All   incisions were marked out and discussed with the patient preoperatively.     An inframammary incision was made, which was taken down through subcutaneous   tissue.  The expander pocket was entered with cloudy fluid within the pocket.    This fluid was sent for culture.  The expander, which was not sutured in was   easily removed.  The expander was intact with a minimal amount of saline   inside.  There was fibrinous tissue attached to the implant, which was sent   separately for tissue culture.  The pocket was then irrigated with antibiotic   saline solution.  A drain was placed within the pocket being brought out   through a lateral scar in the inframammary crease.  The 15 round drain was   sutured in using 3-0 nylon suture.  The inframammary incision was then closed   using 3-0 Vicryl in the superficial fascial layer and 4-0 Monocryl was used to    close the intracuticular layer.  Steri-Strips were applied along with a dry   dressing.  A drain was placed to bulb suction with no air leaks.  Patient   tolerated the procedure well.  She will be transported to recovery where she   will be followed as an inpatient.        ____________________________________     DIOR GRANDE MD       LABORATORY  Lab Results   Component Value Date    SODIUM 138 06/19/2018    POTASSIUM 4.0 06/19/2018    CHLORIDE 105 06/19/2018    CO2 24 06/19/2018    GLUCOSE 90 06/19/2018    BUN 11 06/19/2018    CREATININE 0.80 06/19/2018        Lab Results   Component Value Date    WBC 5.8 06/19/2018    HEMOGLOBIN 10.0 (L) 06/19/2018    HEMATOCRIT 31.4 (L) 06/19/2018    PLATELETCT 391 06/19/2018        Total time of the discharge process exceeds 36 minutes.

## 2018-06-22 LAB
BACTERIA TISS AEROBE CULT: ABNORMAL
BACTERIA TISS AEROBE CULT: ABNORMAL
BACTERIA WND AEROBE CULT: ABNORMAL
GRAM STN SPEC: ABNORMAL
SIGNIFICANT IND 70042: ABNORMAL
SITE SITE: ABNORMAL
SOURCE SOURCE: ABNORMAL

## 2018-06-23 LAB
BACTERIA BLD CULT: NORMAL
BACTERIA BLD CULT: NORMAL
SIGNIFICANT IND 70042: NORMAL
SIGNIFICANT IND 70042: NORMAL
SITE SITE: NORMAL
SITE SITE: NORMAL
SOURCE SOURCE: NORMAL
SOURCE SOURCE: NORMAL

## 2018-06-26 ENCOUNTER — OFFICE VISIT (OUTPATIENT)
Dept: BEHAVIORAL HEALTH | Facility: PHYSICIAN GROUP | Age: 38
End: 2018-06-26
Payer: COMMERCIAL

## 2018-06-26 DIAGNOSIS — F41.8 SITUATIONAL ANXIETY: ICD-10-CM

## 2018-06-26 DIAGNOSIS — F41.1 GENERALIZED ANXIETY DISORDER: ICD-10-CM

## 2018-06-26 PROCEDURE — 99214 OFFICE O/P EST MOD 30 MIN: CPT | Performed by: STUDENT IN AN ORGANIZED HEALTH CARE EDUCATION/TRAINING PROGRAM
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
## 2018-06-27 ENCOUNTER — OFFICE VISIT (OUTPATIENT)
Dept: INFECTIOUS DISEASES | Facility: MEDICAL CENTER | Age: 38
End: 2018-06-27
Payer: COMMERCIAL

## 2018-06-27 VITALS
WEIGHT: 215 LBS | TEMPERATURE: 97.9 F | OXYGEN SATURATION: 94 % | SYSTOLIC BLOOD PRESSURE: 112 MMHG | DIASTOLIC BLOOD PRESSURE: 60 MMHG | HEART RATE: 102 BPM | HEIGHT: 64 IN | BODY MASS INDEX: 36.7 KG/M2

## 2018-06-27 DIAGNOSIS — C50.912 MALIGNANT NEOPLASM OF LEFT FEMALE BREAST, UNSPECIFIED ESTROGEN RECEPTOR STATUS, UNSPECIFIED SITE OF BREAST (HCC): ICD-10-CM

## 2018-06-27 DIAGNOSIS — T85.79XD INFECTION OF BREAST TISSUE EXPANDER, SUBSEQUENT ENCOUNTER: ICD-10-CM

## 2018-06-27 DIAGNOSIS — Z15.02 BRCA GENE MUTATION POSITIVE IN FEMALE: ICD-10-CM

## 2018-06-27 DIAGNOSIS — Z15.09 BRCA GENE MUTATION POSITIVE IN FEMALE: ICD-10-CM

## 2018-06-27 DIAGNOSIS — Z15.01 BRCA GENE MUTATION POSITIVE IN FEMALE: ICD-10-CM

## 2018-06-27 PROCEDURE — 99214 OFFICE O/P EST MOD 30 MIN: CPT | Performed by: NURSE PRACTITIONER

## 2018-06-27 NOTE — PROGRESS NOTES
Subjective:     Chief Complaint   Patient presents with   • Hospital Follow-up     Right Breast cellulitis     Infectious Disease clinic follow up  Pebbles Cosme 38 y.o.female in clinic today for evaluation and management of right Breast infection. Primary care provider: Pcp Pt States None. This is my first time meeting Ms. Cosme.     Interval History: pt hospitalized 6/15/18-18 due to right breast infection.  History of breast CA and bilateral mastectomy. Family hx BRCA 1- mother  of ovarian CA. She underwent expander reconstruction. On 2018 she had left breast wound and threatened implant exposure hence she  underwent excision of the wounds and local flap closures. The implant was removed . Her wound cultures were pseudomonas aeruginosa at that time. She presented to the emergency room with worsening of right breast pain for 5 days with associated redness swelling and warmth and fevers.  Failed outpatient Bactrim. 18 she underwent right breat expander removal. OR cultures - NGTD (on abx prior). Discharged home with EDWAR drain in place on PO Zyvox and Cipro. End date 18.     Hospital records reviewed    Today 18: Patient reports feeling well. She has been closely followed by Dr. Osuna. Pt stating that the surgical wound/EDWAR site  is healing well.  Denies open wound, drainage, pungent odor, redness, pain.  She states that she has been taking the antibiotics as prescribed without adverse effect.  Denies feeling generally ill, fevers/chills, general malaise, headache, n/v/d. She is currently undergoing radiation. She has questions about the possibility of reconstructive surgery in the future.       Past Medical History:   Diagnosis Date   • Anxiety    • Cancer (HCC) 2017    breast cancer   • Pain     edwar drains in place bilateral chest   • Psychiatric problem     anxiety       Allergies: Patient has no known allergies.    Current medications and problem list reviewed with patient  "and updated in EPIC.    ROS  As documented above in my HPI       Objective:     Blood pressure 112/60, pulse (!) 102, temperature 36.6 °C (97.9 °F), height 1.626 m (5' 4\"), weight 97.5 kg (215 lb), SpO2 94 %.    Rechecked HR: 89    Vital signs reviewed  Constitutional: patient is oriented to person, place, and time. Appears well-developed and well-nourished. No distress  Eyes: Conjunctivae normal and EOM are normal. Pupils are equal, round, and reactive to light.   Mouth/Throat: Lips without lesions, good dentition, oropharynx is clear and moist.  Neck: Trachea midline. Normal range of motion. Neck supple. No masses  Cardiovascular: Normal rate, regular rhythm, normal heart sounds and intact distal pulses. No murmur, gallop, or friction rub. No edema.  Pulmonary/Chest: No respiratory distress. Unlabored respiratory effort, lungs clear to auscultation. No wheezes or rales.   Abdominal: Soft, non tender. BS + x 4. No masses or hepatosplenomegaly.   Musculoskeletal: Normal range of motion. No tenderness, swelling, erythema, deformity noted.  Neurological: He is alert and oriented to person, place, and time. No cranial nerve deficit. Coordination normal.   Skin: Skin is warm and dry. Good turgor. No rashes visable.  Scarring to both breasts. s/p bilateral mastectomy. No open wounds. No drainage or erythema.   Psychiatric: Normal mood and affect. Behavior is normal.     Assessment and Plan:   The following treatment plan was discussed with patient at length  1. Infection of breast tissue expander, subsequent encounter     2. Malignant neoplasm of left female breast, unspecified estrogen receptor status, unspecified site of breast (HCC)     3. BRCA gene mutation positive in female       I have encouraged her to FU with Dr Osuna regarding her questions surrounding future reconstructive surgery.   Complete antibiotics 7/1/18 as scheduled    Follow up:PRN. FU with PCP for ongoing chronic medical conditions.       "

## 2018-09-17 ENCOUNTER — HOSPITAL ENCOUNTER (OUTPATIENT)
Dept: LAB | Facility: MEDICAL CENTER | Age: 38
End: 2018-09-17
Attending: INTERNAL MEDICINE
Payer: COMMERCIAL

## 2018-09-17 LAB
ALBUMIN SERPL BCP-MCNC: 4.4 G/DL (ref 3.2–4.9)
ALBUMIN/GLOB SERPL: 1.6 G/DL
ALP SERPL-CCNC: 115 U/L (ref 30–99)
ALT SERPL-CCNC: 39 U/L (ref 2–50)
ANION GAP SERPL CALC-SCNC: 11 MMOL/L (ref 0–11.9)
AST SERPL-CCNC: 28 U/L (ref 12–45)
BILIRUB SERPL-MCNC: 0.4 MG/DL (ref 0.1–1.5)
BUN SERPL-MCNC: 10 MG/DL (ref 8–22)
CALCIUM SERPL-MCNC: 9.5 MG/DL (ref 8.5–10.5)
CHLORIDE SERPL-SCNC: 101 MMOL/L (ref 96–112)
CO2 SERPL-SCNC: 24 MMOL/L (ref 20–33)
CREAT SERPL-MCNC: 0.79 MG/DL (ref 0.5–1.4)
GLOBULIN SER CALC-MCNC: 2.7 G/DL (ref 1.9–3.5)
GLUCOSE SERPL-MCNC: 86 MG/DL (ref 65–99)
MAGNESIUM SERPL-MCNC: 2.2 MG/DL (ref 1.5–2.5)
POTASSIUM SERPL-SCNC: 4.3 MMOL/L (ref 3.6–5.5)
PROT SERPL-MCNC: 7.1 G/DL (ref 6–8.2)
SODIUM SERPL-SCNC: 136 MMOL/L (ref 135–145)

## 2018-09-17 PROCEDURE — 80053 COMPREHEN METABOLIC PANEL: CPT

## 2018-09-17 PROCEDURE — 83735 ASSAY OF MAGNESIUM: CPT

## 2018-10-09 ENCOUNTER — HOSPITAL ENCOUNTER (OUTPATIENT)
Dept: LAB | Facility: MEDICAL CENTER | Age: 38
End: 2018-10-09
Attending: NURSE PRACTITIONER
Payer: COMMERCIAL

## 2018-10-09 LAB
ALBUMIN SERPL BCP-MCNC: 4.2 G/DL (ref 3.2–4.9)
ALBUMIN/GLOB SERPL: 1.6 G/DL
ALP SERPL-CCNC: 123 U/L (ref 30–99)
ALT SERPL-CCNC: 83 U/L (ref 2–50)
AMBIGUOUS DTTM AMBI4: NORMAL
ANION GAP SERPL CALC-SCNC: 11 MMOL/L (ref 0–11.9)
AST SERPL-CCNC: 48 U/L (ref 12–45)
BILIRUB SERPL-MCNC: 0.4 MG/DL (ref 0.1–1.5)
BUN SERPL-MCNC: 16 MG/DL (ref 8–22)
CALCIUM SERPL-MCNC: 9.3 MG/DL (ref 8.5–10.5)
CHLORIDE SERPL-SCNC: 102 MMOL/L (ref 96–112)
CO2 SERPL-SCNC: 25 MMOL/L (ref 20–33)
CREAT SERPL-MCNC: 0.67 MG/DL (ref 0.5–1.4)
GLOBULIN SER CALC-MCNC: 2.6 G/DL (ref 1.9–3.5)
GLUCOSE SERPL-MCNC: 96 MG/DL (ref 65–99)
POTASSIUM SERPL-SCNC: 4.1 MMOL/L (ref 3.6–5.5)
PROT SERPL-MCNC: 6.8 G/DL (ref 6–8.2)
SODIUM SERPL-SCNC: 138 MMOL/L (ref 135–145)
TSH SERPL DL<=0.005 MIU/L-ACNC: 2.89 UIU/ML (ref 0.38–5.33)

## 2018-10-09 PROCEDURE — 84443 ASSAY THYROID STIM HORMONE: CPT

## 2018-10-09 PROCEDURE — 80053 COMPREHEN METABOLIC PANEL: CPT

## 2018-11-01 ENCOUNTER — HOSPITAL ENCOUNTER (OUTPATIENT)
Dept: LAB | Facility: MEDICAL CENTER | Age: 38
End: 2018-11-01
Attending: INTERNAL MEDICINE
Payer: COMMERCIAL

## 2018-11-01 PROCEDURE — 80053 COMPREHEN METABOLIC PANEL: CPT

## 2018-11-01 PROCEDURE — 83735 ASSAY OF MAGNESIUM: CPT

## 2018-11-02 LAB
ALBUMIN SERPL BCP-MCNC: 4.3 G/DL (ref 3.2–4.9)
ALBUMIN/GLOB SERPL: 1.7 G/DL
ALP SERPL-CCNC: 164 U/L (ref 30–99)
ALT SERPL-CCNC: 72 U/L (ref 2–50)
ANION GAP SERPL CALC-SCNC: 7 MMOL/L (ref 0–11.9)
AST SERPL-CCNC: 33 U/L (ref 12–45)
BILIRUB SERPL-MCNC: 0.4 MG/DL (ref 0.1–1.5)
BUN SERPL-MCNC: 14 MG/DL (ref 8–22)
CALCIUM SERPL-MCNC: 9.5 MG/DL (ref 8.5–10.5)
CHLORIDE SERPL-SCNC: 104 MMOL/L (ref 96–112)
CO2 SERPL-SCNC: 25 MMOL/L (ref 20–33)
CREAT SERPL-MCNC: 0.62 MG/DL (ref 0.5–1.4)
GLOBULIN SER CALC-MCNC: 2.5 G/DL (ref 1.9–3.5)
GLUCOSE SERPL-MCNC: 100 MG/DL (ref 65–99)
MAGNESIUM SERPL-MCNC: 2 MG/DL (ref 1.5–2.5)
POTASSIUM SERPL-SCNC: 4.5 MMOL/L (ref 3.6–5.5)
PROT SERPL-MCNC: 6.8 G/DL (ref 6–8.2)
SODIUM SERPL-SCNC: 136 MMOL/L (ref 135–145)

## 2018-11-05 ENCOUNTER — DOCUMENTATION (OUTPATIENT)
Dept: BEHAVIORAL HEALTH | Facility: CLINIC | Age: 38
End: 2018-11-05

## 2018-11-05 ENCOUNTER — OFFICE VISIT (OUTPATIENT)
Dept: BEHAVIORAL HEALTH | Facility: CLINIC | Age: 38
End: 2018-11-05
Payer: COMMERCIAL

## 2018-11-05 VITALS
HEART RATE: 89 BPM | WEIGHT: 244 LBS | HEIGHT: 64 IN | BODY MASS INDEX: 41.66 KG/M2 | SYSTOLIC BLOOD PRESSURE: 149 MMHG | DIASTOLIC BLOOD PRESSURE: 93 MMHG

## 2018-11-05 DIAGNOSIS — F41.1 GENERALIZED ANXIETY DISORDER WITH PANIC ATTACKS: ICD-10-CM

## 2018-11-05 DIAGNOSIS — F41.0 GENERALIZED ANXIETY DISORDER WITH PANIC ATTACKS: ICD-10-CM

## 2018-11-05 PROBLEM — F41.8 SITUATIONAL ANXIETY: Status: RESOLVED | Noted: 2018-03-23 | Resolved: 2018-11-05

## 2018-11-05 PROCEDURE — 99213 OFFICE O/P EST LOW 20 MIN: CPT | Mod: GC | Performed by: PSYCHIATRY & NEUROLOGY

## 2018-11-05 RX ORDER — CAPECITABINE 500 MG/1
1250 TABLET, FILM COATED ORAL 2 TIMES DAILY
COMMUNITY
End: 2019-02-13

## 2018-11-05 RX ORDER — METHYLPHENIDATE HYDROCHLORIDE 5 MG/1
1 TABLET ORAL 2 TIMES DAILY
Refills: 0 | COMMUNITY
Start: 2018-10-09 | End: 2020-02-24

## 2018-11-05 RX ORDER — ALPRAZOLAM 0.25 MG/1
0.25 TABLET ORAL
Qty: 30 TAB | Refills: 0 | Status: SHIPPED
Start: 2018-11-05 | End: 2018-12-05

## 2018-11-05 ASSESSMENT — ENCOUNTER SYMPTOMS: PALPITATIONS: 0
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
## 2018-11-20 ENCOUNTER — HOSPITAL ENCOUNTER (OUTPATIENT)
Dept: LAB | Facility: MEDICAL CENTER | Age: 38
End: 2018-11-20
Attending: INTERNAL MEDICINE
Payer: COMMERCIAL

## 2018-11-20 LAB
ALBUMIN SERPL BCP-MCNC: 4.1 G/DL (ref 3.2–4.9)
ALBUMIN/GLOB SERPL: 1.4 G/DL
ALP SERPL-CCNC: 161 U/L (ref 30–99)
ALT SERPL-CCNC: 78 U/L (ref 2–50)
ANION GAP SERPL CALC-SCNC: 8 MMOL/L (ref 0–11.9)
AST SERPL-CCNC: 46 U/L (ref 12–45)
BILIRUB SERPL-MCNC: 0.4 MG/DL (ref 0.1–1.5)
BUN SERPL-MCNC: 14 MG/DL (ref 8–22)
CALCIUM SERPL-MCNC: 9 MG/DL (ref 8.5–10.5)
CHLORIDE SERPL-SCNC: 103 MMOL/L (ref 96–112)
CO2 SERPL-SCNC: 24 MMOL/L (ref 20–33)
CREAT SERPL-MCNC: 0.65 MG/DL (ref 0.5–1.4)
ESTRADIOL SERPL-MCNC: 113 PG/ML
FSH SERPL-ACNC: 18.7 MIU/ML
GLOBULIN SER CALC-MCNC: 2.9 G/DL (ref 1.9–3.5)
GLUCOSE SERPL-MCNC: 84 MG/DL (ref 65–99)
POTASSIUM SERPL-SCNC: 4.4 MMOL/L (ref 3.6–5.5)
PROT SERPL-MCNC: 7 G/DL (ref 6–8.2)
SODIUM SERPL-SCNC: 135 MMOL/L (ref 135–145)

## 2018-11-20 PROCEDURE — 82670 ASSAY OF TOTAL ESTRADIOL: CPT

## 2018-11-20 PROCEDURE — 80053 COMPREHEN METABOLIC PANEL: CPT

## 2018-11-20 PROCEDURE — 83001 ASSAY OF GONADOTROPIN (FSH): CPT

## 2018-12-13 ENCOUNTER — HOSPITAL ENCOUNTER (OUTPATIENT)
Dept: LAB | Facility: MEDICAL CENTER | Age: 38
End: 2018-12-13
Attending: INTERNAL MEDICINE
Payer: COMMERCIAL

## 2018-12-13 PROCEDURE — 84703 CHORIONIC GONADOTROPIN ASSAY: CPT

## 2018-12-13 PROCEDURE — 84481 FREE ASSAY (FT-3): CPT

## 2018-12-13 PROCEDURE — 80053 COMPREHEN METABOLIC PANEL: CPT

## 2018-12-13 PROCEDURE — 84439 ASSAY OF FREE THYROXINE: CPT

## 2018-12-13 PROCEDURE — 84443 ASSAY THYROID STIM HORMONE: CPT

## 2018-12-13 PROCEDURE — 83735 ASSAY OF MAGNESIUM: CPT

## 2018-12-14 LAB
ALBUMIN SERPL BCP-MCNC: 4 G/DL (ref 3.2–4.9)
ALBUMIN/GLOB SERPL: 1.7 G/DL
ALP SERPL-CCNC: 168 U/L (ref 30–99)
ALT SERPL-CCNC: 58 U/L (ref 2–50)
ANION GAP SERPL CALC-SCNC: 8 MMOL/L (ref 0–11.9)
AST SERPL-CCNC: 30 U/L (ref 12–45)
BILIRUB SERPL-MCNC: 0.4 MG/DL (ref 0.1–1.5)
BUN SERPL-MCNC: 12 MG/DL (ref 8–22)
CALCIUM SERPL-MCNC: 8.4 MG/DL (ref 8.5–10.5)
CHLORIDE SERPL-SCNC: 104 MMOL/L (ref 96–112)
CO2 SERPL-SCNC: 23 MMOL/L (ref 20–33)
CREAT SERPL-MCNC: 0.63 MG/DL (ref 0.5–1.4)
GLOBULIN SER CALC-MCNC: 2.4 G/DL (ref 1.9–3.5)
GLUCOSE SERPL-MCNC: 98 MG/DL (ref 65–99)
HCG SERPL QL: NEGATIVE
MAGNESIUM SERPL-MCNC: 2 MG/DL (ref 1.5–2.5)
POTASSIUM SERPL-SCNC: 4.1 MMOL/L (ref 3.6–5.5)
PROT SERPL-MCNC: 6.4 G/DL (ref 6–8.2)
SODIUM SERPL-SCNC: 135 MMOL/L (ref 135–145)
T3FREE SERPL-MCNC: 3.43 PG/ML (ref 2.4–4.2)
T4 FREE SERPL-MCNC: 0.91 NG/DL (ref 0.53–1.43)
TSH SERPL DL<=0.005 MIU/L-ACNC: 2.36 UIU/ML (ref 0.38–5.33)

## 2019-01-24 ENCOUNTER — OFFICE VISIT (OUTPATIENT)
Dept: ENDOCRINOLOGY | Facility: MEDICAL CENTER | Age: 39
End: 2019-01-24
Payer: COMMERCIAL

## 2019-01-24 VITALS
HEART RATE: 102 BPM | OXYGEN SATURATION: 97 % | BODY MASS INDEX: 41.25 KG/M2 | SYSTOLIC BLOOD PRESSURE: 136 MMHG | DIASTOLIC BLOOD PRESSURE: 90 MMHG | HEIGHT: 64 IN | WEIGHT: 241.6 LBS

## 2019-01-24 DIAGNOSIS — E55.9 VITAMIN D DEFICIENCY: ICD-10-CM

## 2019-01-24 DIAGNOSIS — E04.2 MULTIPLE THYROID NODULES: ICD-10-CM

## 2019-01-24 DIAGNOSIS — E03.8 SUBCLINICAL HYPOTHYROIDISM: ICD-10-CM

## 2019-01-24 DIAGNOSIS — E66.01 CLASS 3 SEVERE OBESITY WITHOUT SERIOUS COMORBIDITY IN ADULT, UNSPECIFIED BMI, UNSPECIFIED OBESITY TYPE (HCC): ICD-10-CM

## 2019-01-24 DIAGNOSIS — R53.83 FATIGUE, UNSPECIFIED TYPE: ICD-10-CM

## 2019-01-24 PROBLEM — E66.813 CLASS 3 SEVERE OBESITY WITHOUT SERIOUS COMORBIDITY IN ADULT (HCC): Status: ACTIVE | Noted: 2019-01-24

## 2019-01-24 PROCEDURE — 99204 OFFICE O/P NEW MOD 45 MIN: CPT | Performed by: PHYSICIAN ASSISTANT

## 2019-01-24 RX ORDER — AMOXICILLIN AND CLAVULANATE POTASSIUM 875; 125 MG/1; MG/1
TABLET, FILM COATED ORAL
Refills: 0 | COMMUNITY
Start: 2018-12-27 | End: 2019-02-13

## 2019-01-24 RX ORDER — TRIAMCINOLONE ACETONIDE 5 MG/G
CREAM TOPICAL
Refills: 0 | COMMUNITY
Start: 2018-12-12 | End: 2019-02-13

## 2019-01-24 RX ORDER — ALPRAZOLAM 0.25 MG/1
0.25 TABLET ORAL
COMMUNITY
End: 2019-02-13

## 2019-01-24 RX ORDER — TRAMADOL HYDROCHLORIDE 50 MG/1
50 TABLET ORAL
COMMUNITY
Start: 2018-03-29 | End: 2019-02-13

## 2019-01-24 RX ORDER — GARLIC 200 MG
300 TABLET ORAL
COMMUNITY

## 2019-01-24 RX ORDER — TRAMADOL HYDROCHLORIDE 50 MG/1
TABLET ORAL
Refills: 0 | COMMUNITY
Start: 2018-12-21 | End: 2019-02-13

## 2019-01-24 RX ORDER — OMEPRAZOLE 20 MG/1
20 CAPSULE, DELAYED RELEASE ORAL
COMMUNITY
End: 2019-01-24

## 2019-01-24 RX ORDER — CEPHALEXIN 500 MG/1
CAPSULE ORAL
Refills: 0 | COMMUNITY
Start: 2018-12-12 | End: 2019-02-13

## 2019-01-24 RX ORDER — POLYETHYLENE GLYCOL 1000
1 POWDER (GRAM) MISCELLANEOUS
COMMUNITY
End: 2019-01-24

## 2019-01-24 RX ORDER — OMEPRAZOLE 40 MG/1
CAPSULE, DELAYED RELEASE ORAL
Refills: 6 | COMMUNITY
Start: 2019-01-18 | End: 2020-09-29

## 2019-01-24 RX ORDER — MELATONIN 10 MG
20 CAPSULE ORAL
COMMUNITY

## 2019-01-24 RX ORDER — HYDROCODONE BITARTRATE AND ACETAMINOPHEN 5; 325 MG/1; MG/1
TABLET ORAL
Refills: 0 | COMMUNITY
Start: 2018-12-27 | End: 2019-02-13

## 2019-01-25 NOTE — PROGRESS NOTES
New Patient Consult Note  Referred by: Pcp Pt States None      HPI:  Pebbles Cosme is a  38 y.o. old patient who comes in today for new patient evaluation evaluation and management of the followin. Multiple thyroid nodules  On ultrasound    Patient denies symptoms of sensation of a throat mass, voice changes, hoarseness, neck pain, or difficulty swallowing, dysphonia, cough, enlarged cervical lymph nodes, sweating, tremors, heat intolerance.      Patient was recently treated with neck radiation for her breast.    She denies any family history of thyroid cancer.     Thyroid ultrasound 2018 (compared to 2017)  Right thyroid lobe contains a well-circumscribed hypoechoic cystic nodule which measures 7 mm x 6 mm x 5 mm.  Left lobe contains multiple hypoechoic nodules the largest measuring 1.0 cm x 0.6 cm and 0.5 cm.  The left thyroid lobe also contains a posterior hypoechoic cystic nodule which measures 1.2 x 0.8 x 0.8 lateral dimension which appears stable.  When compared to prior examination of the left lobe of the thyroid gland exhibits multiple new hypoechoic nodules the largest measuring 1 cm in greatest dimension.  She does have bilateral cystic nodules    2. Subclinical hypothyroidism  Abnormal thyroid labs with potential symptoms consistent with subclinical hypothyroidism.    2018 TSH 2.360 free T4 0.91 free T3 3.43  10/2018 TSH 2.890  2017 TSH 3.9 free T4 0.74    3. Class 3 severe obesity without serious comorbidity in adult, unspecified BMI, unspecified obesity type (HCC)  Patient previously was very active.  She does go to [employer].  She is involved in a program with regards to weight management.  She has seen a  in the past.    2018 glucose 100      4. Fatigue, unspecified type-vitamin D deficiency  Patient did just recently undergo breast cancer treatment for the last year and a half.  She has been feeling better however majority of the days she feels  as if she has difficulty starting and getting out of bed.  She has to force herself to go to the gym.    She has been treated with Ritalin secondary to fatigue by her oncologist.  She is currently not taking Ritalin.    2/26/2018 vitamin D 28  6/19/2018 vitamin B12 694    ROS:  Constitutional: Positive weight gain despite being very active as a previous .  According to the patient she does not eat much.  Denies fever positive fatigue all the time  HEENT: No difficulty with swallowing, change in voice, or swelling in throat area   Cardiac: No chest pain, palpitations, or racing heart  Resp: No shortness of breath  GI: No abdominal pain, nausea, vomiting, or diarrhea   Neuro: Positive neuropathy   Endo: Positive cold intolerance, no polyuria or polydipsia  All other systems were reviewed and were negative.    Past Medical History:  Patient Active Problem List    Diagnosis Date Noted   • Infected breast tissue expander (HCC) 06/15/2018     Priority: High   • Breast cancer ([facility]) 06/15/2018     Priority: Medium   • Generalized anxiety disorder with panic attacks 08/07/2017     Priority: Low   • Multiple thyroid nodules 01/24/2019   • Subclinical hypothyroidism 01/24/2019   • Class 3 severe obesity without serious comorbidity in adult (HCC) 01/24/2019   • Fatigue 01/24/2019   • BRCA gene mutation positive in female 06/19/2018   • H/O LEEP 06/01/2015   • Family hx of ovarian malignancy 06/01/2015   • Decreased energy 06/01/2015   • Insomnia 06/01/2015   • Anxiety 06/01/2015       Past Surgical History:  Past Surgical History:   Procedure Laterality Date   • BREAST IMPLANT REMOVAL Right 6/18/2018    Procedure: BREAST EXPANDER  REMOVAL;  Surgeon: Niki Osuna M.D.;  Location: SURGERY [facility];  Service: Plastics   • FLAP GRAFT Right 5/16/2018    Procedure: FLAP GRAFT-   ADJACENT TISSUE TRANSFER OR REARRANGEMENT, TRUNK;  Surgeon: Niki Osuna M.D.;  Location: SURGERY SAME DAY [facility];  Service:  Plastics   • OTHER  04/2018    bilateral mastectomy   • OTHER  03/2018    lymph node dissection   • OTHER  10/11/2017    port placement   • OPEN REDUCTION      R foot   • OTHER      wisdom teeth removed       Allergies:  Patient has no known allergies.    Social History:  Social History     Social History   • Marital status: Single     Spouse name: N/A   • Number of children: N/A   • Years of education: N/A     Occupational History   • Not on file.     Social History Main Topics   • Smoking status: Former Smoker     Packs/day: 0.25     Types: Cigarettes   • Smokeless tobacco: Never Used   • Alcohol use No      Comment: can't drink on medications   • Drug use: No   • Sexual activity: Not Currently     Partners: Male     Other Topics Concern   • Not on file     Social History Narrative   • No narrative on file       Family History:  Family History   Problem Relation Age of Onset   • Cancer Mother 45        ovarian ca   • Hypertension Father    • Heart Disease Father    • Hyperlipidemia Father        Medications:    Current Outpatient Prescriptions:   •  Ascorbic Acid (VITAMIN C) Powder, Take 300 g by mouth., Disp: , Rfl:   •  Melatonin 10 MG Cap, Take 20 mg by mouth., Disp: , Rfl:   •  omeprazole (PRILOSEC) 40 MG delayed-release capsule, TK 1 C PO QD, Disp: , Rfl: 6  •  methylphenidate (RITALIN) 5 MG Tab, Take 1 Tab by mouth 2 Times a Day., Disp: , Rfl: 0  •  ferrous sulfate 325 (65 Fe) MG tablet, Take 1 Tab by mouth every morning with breakfast., Disp: 30 Tab, Rfl: 12  •  TURMERIC CURCUMIN PO, Take 1 Tab by mouth every day., Disp: , Rfl:   •  multivitamin (THERAGRAN) Tab, Take 1 Tab by mouth every day., Disp: , Rfl:   •  vitamin D, Ergocalciferol, (DRISDOL) 52148 units Cap capsule, Take 50,000 Units by mouth every 7 days., Disp: , Rfl:   •  omeprazole (PRILOSEC) 20 MG delayed-release capsule, Take 20 mg by mouth., Disp: , Rfl:   •  Polyethylene Glycol 1000 Liquid, Take 1 Packet by mouth., Disp: , Rfl:   •  tramadol  "(ULTRAM) 50 MG Tab, Take 50 mg by mouth., Disp: , Rfl:   •  Zinc Sulfate (ZINC 15 PO), Take 1 Capsule by mouth., Disp: , Rfl:   •  ALPRAZolam (XANAX) 0.25 MG Tab, Take 0.25 mg by mouth., Disp: , Rfl:   •  amoxicillin-clavulanate (AUGMENTIN) 875-125 MG Tab, TK 1 T PO BID PC, Disp: , Rfl: 0  •  cephALEXin (KEFLEX) 500 MG Cap, TK 1 C PO Q 12 H FOR 7 DAYS, Disp: , Rfl: 0  •  HYDROcodone-acetaminophen (NORCO) 5-325 MG Tab per tablet, TK 1 T PO BID PRF TOE PAIN FOR 4 DAYS, Disp: , Rfl: 0  •  tramadol (ULTRAM) 50 MG Tab, TK 1 T PO Q 8 H PRN P FOR 7 DAYS, Disp: , Rfl: 0  •  triamcinolone acetonide (KENALOG) 0.5 % Cream, DEANNE THIN LAYER EXT AA BID, Disp: , Rfl: 0  •  ALPRAZolam (XANAX) 0.25 MG Tab, Take 1 Tab by mouth 1 time daily as needed (severe anxiety) for up to 30 days., Disp: 30 Tab, Rfl: 0  •  capecitabine (XELODA) 500 MG tablet, Take 1,250 mg/m2 by mouth 2 Times a Day., Disp: , Rfl:   •  MELATONIN PO, Take 1 Tab by mouth every day., Disp: , Rfl:   •  ascorbic acid (ASCORBIC ACID) 500 MG Tab, Take 500 mg by mouth every day., Disp: , Rfl:     Labs: Reviewed (as above)     Physical Examination:  Vital signs: /90 (BP Location: Right arm, Patient Position: Sitting, BP Cuff Size: Adult)   Pulse (!) 102   Ht 1.626 m (5' 4.02\")   Wt 109.6 kg (241 lb 9.6 oz)   SpO2 97%   BMI 41.45 kg/m²  Body mass index is 41.45 kg/m².  General: No apparent distress, cooperative, no facial hair    Eyes: No scleral icterus or discharge, no nystagmus  ENMT: Normal on external inspection of nose, lips, normal thyroid exam  Neck: No abnormal masses on inspection or palpations. no bruits noted   Resp: Normal effort, clear to auscultation bilaterally without wheezes, rales or rhonchi  CVS: Regular rate and rhythm, S1 S2 normal, no murmur, rubs or gallops    Extremities: No edema.  Abdomen: abdominal obesity present,   Neuro: Alert and oriented  Skin: No rash  Psych: Normal mood and affect, intact memory and able to make informed " "decisions    Assessment and Plan:    1. Multiple thyroid nodules  At the present time I have reviewed her most recent ultrasound report.  We have discussed the etiologies and pathophysiology with regards to thyroid nodules.  There does seem to be some change in her ultrasound with regards to the amount however the size has continued to be stable at least according to ultrasound report.  After the long discussion today we will repeat ultrasound in approximately 6 months time unless symptoms intervene.  We did discuss potential evaluation with thyroid uptake and scan versus thyroid biopsy for the future.      2. Subclinical hypothyroidism  Her TSH has always been borderline.  It has been high prior and she is currently with symptoms.  We did discuss that subclinical hypothyroidism could be treated with replacement T4.  Treatment options were discussed with the patient today.  We will repeat labs prior to her next evaluation.    - COMP METABOLIC PANEL  - TSH; Future  - FREE THYROXINE; Future  - T3 FREE; Future  - THYROID PEROXIDASE  (TPO) AB; Future    3. Class 3 severe obesity without serious comorbidity in adult, unspecified BMI, unspecified obesity type (HCC)  We had a long discussion today with regards to treatment options with regards to obesity and the multiple etiologies.  She is interested in potential weight loss therapy secondary to her \"need\" prior to reconstructive surgery.  We have discussed the use of phentermine and or Saxenda.  I would like her to contact the  with regards to her breast cancer treatment to see if this would exclude her from the use of any potential trial medications.  I have also given her information with regards to Saxenda. She will contact the office if approved.    - REFERRAL TO [facility] IMPROVEMENT PROGRAMS (HIP) Services Requested: Physician Medical Weight Management Program; Reason for Referral? Waist Circumference>40\"; Reason for Visit: " Overweight/Obesity  - ACTH; Future  - CORTISOL; Future  - URINE CORTISOL 24 HR, ICMA; Future  - URINE CREATININE 24 HR; Future  - HEMOGLOBIN A1C; Future  - Lipid Profile    4. Fatigue, unspecified type  Patient had prior vitamin D level of 28 back in February 2018.  She is currently not on replacement.  Will recheck vitamin D level to see if she would require replacement.  - VITAMIN D,25 HYDROXY; Future  - VITAMIN B12; Future      Return in about 4 weeks (around 2/21/2019).     Thank you for allowing me to participate in the care of this patient.  If you have any questions or concerns please do not hesitate to contact me.    Patient was seen for 45 minutes face to face of which > 50% of appointment time was spent on counseling and coordination of care regarding the above.      Chuyita Roach P.A.-C.  01/24/19    CC:   Pcp Pt States None    This note was created using voice recognition software (Dragon). The accuracy of the dictation is limited by the abilities of the software. I have reviewed the note prior to signing, however some errors in grammar and context are still possible. If you have any questions related to this note please do not hesitate to contact our office.

## 2019-02-04 ENCOUNTER — HOSPITAL ENCOUNTER (OUTPATIENT)
Dept: LAB | Facility: MEDICAL CENTER | Age: 39
End: 2019-02-04
Attending: INTERNAL MEDICINE
Payer: COMMERCIAL

## 2019-02-04 LAB
ALBUMIN SERPL BCP-MCNC: 4.2 G/DL (ref 3.2–4.9)
ALBUMIN/GLOB SERPL: 1.4 G/DL
ALP SERPL-CCNC: 111 U/L (ref 30–99)
ALT SERPL-CCNC: 20 U/L (ref 2–50)
ANION GAP SERPL CALC-SCNC: 11 MMOL/L (ref 0–11.9)
AST SERPL-CCNC: 22 U/L (ref 12–45)
BILIRUB SERPL-MCNC: 0.3 MG/DL (ref 0.1–1.5)
BUN SERPL-MCNC: 20 MG/DL (ref 8–22)
CALCIUM SERPL-MCNC: 9.2 MG/DL (ref 8.5–10.5)
CHLORIDE SERPL-SCNC: 101 MMOL/L (ref 96–112)
CO2 SERPL-SCNC: 24 MMOL/L (ref 20–33)
CREAT SERPL-MCNC: 0.73 MG/DL (ref 0.5–1.4)
GLOBULIN SER CALC-MCNC: 2.9 G/DL (ref 1.9–3.5)
GLUCOSE SERPL-MCNC: 102 MG/DL (ref 65–99)
POTASSIUM SERPL-SCNC: 4.3 MMOL/L (ref 3.6–5.5)
PROT SERPL-MCNC: 7.1 G/DL (ref 6–8.2)
SODIUM SERPL-SCNC: 136 MMOL/L (ref 135–145)

## 2019-02-04 PROCEDURE — 80053 COMPREHEN METABOLIC PANEL: CPT

## 2019-02-05 ENCOUNTER — APPOINTMENT (OUTPATIENT)
Dept: BEHAVIORAL HEALTH | Facility: CLINIC | Age: 39
End: 2019-02-05
Payer: COMMERCIAL

## 2019-02-07 ENCOUNTER — HOSPITAL ENCOUNTER (OUTPATIENT)
Dept: LAB | Facility: MEDICAL CENTER | Age: 39
End: 2019-02-07
Attending: PHYSICIAN ASSISTANT
Payer: COMMERCIAL

## 2019-02-07 ENCOUNTER — HOSPITAL ENCOUNTER (OUTPATIENT)
Dept: LAB | Facility: MEDICAL CENTER | Age: 39
End: 2019-02-07
Attending: OBSTETRICS & GYNECOLOGY
Payer: COMMERCIAL

## 2019-02-07 DIAGNOSIS — R53.83 FATIGUE, UNSPECIFIED TYPE: ICD-10-CM

## 2019-02-07 DIAGNOSIS — E66.01 CLASS 3 SEVERE OBESITY WITHOUT SERIOUS COMORBIDITY IN ADULT, UNSPECIFIED BMI, UNSPECIFIED OBESITY TYPE (HCC): ICD-10-CM

## 2019-02-07 DIAGNOSIS — E03.8 SUBCLINICAL HYPOTHYROIDISM: ICD-10-CM

## 2019-02-07 LAB
25(OH)D3 SERPL-MCNC: 30 NG/ML (ref 30–100)
CANCER AG125 SERPL-ACNC: 33.9 U/ML (ref 0–35)
CORTIS SERPL-MCNC: 11.6 UG/DL (ref 0–23)
EST. AVERAGE GLUCOSE BLD GHB EST-MCNC: 97 MG/DL
HBA1C MFR BLD: 5 % (ref 0–5.6)
T3FREE SERPL-MCNC: 3.65 PG/ML (ref 2.4–4.2)
T4 FREE SERPL-MCNC: 1.02 NG/DL (ref 0.53–1.43)
THYROPEROXIDASE AB SERPL-ACNC: 0.3 IU/ML (ref 0–9)
TSH SERPL DL<=0.005 MIU/L-ACNC: 2.16 UIU/ML (ref 0.38–5.33)
VIT B12 SERPL-MCNC: 494 PG/ML (ref 211–911)

## 2019-02-07 PROCEDURE — 83036 HEMOGLOBIN GLYCOSYLATED A1C: CPT

## 2019-02-07 PROCEDURE — 84443 ASSAY THYROID STIM HORMONE: CPT

## 2019-02-07 PROCEDURE — 36415 COLL VENOUS BLD VENIPUNCTURE: CPT

## 2019-02-07 PROCEDURE — 82533 TOTAL CORTISOL: CPT

## 2019-02-07 PROCEDURE — 82306 VITAMIN D 25 HYDROXY: CPT

## 2019-02-07 PROCEDURE — 86304 IMMUNOASSAY TUMOR CA 125: CPT

## 2019-02-07 PROCEDURE — 84439 ASSAY OF FREE THYROXINE: CPT

## 2019-02-07 PROCEDURE — 82607 VITAMIN B-12: CPT

## 2019-02-07 PROCEDURE — 86376 MICROSOMAL ANTIBODY EACH: CPT

## 2019-02-07 PROCEDURE — 84481 FREE ASSAY (FT-3): CPT

## 2019-02-07 PROCEDURE — 82024 ASSAY OF ACTH: CPT

## 2019-02-09 LAB — ACTH PLAS-MCNC: 11 PG/ML (ref 6–58)

## 2019-02-11 ENCOUNTER — HOSPITAL ENCOUNTER (OUTPATIENT)
Facility: MEDICAL CENTER | Age: 39
End: 2019-02-11
Attending: PHYSICIAN ASSISTANT
Payer: COMMERCIAL

## 2019-02-11 DIAGNOSIS — E66.01 CLASS 3 SEVERE OBESITY WITHOUT SERIOUS COMORBIDITY IN ADULT, UNSPECIFIED BMI, UNSPECIFIED OBESITY TYPE (HCC): ICD-10-CM

## 2019-02-11 LAB
CREAT 24H UR-MSRATE: 1584 MG/24 HR (ref 800–1800)
CREAT UR-MCNC: 53.7 MG/DL
SPECIMEN VOL UR: 2950 ML

## 2019-02-11 PROCEDURE — 82570 ASSAY OF URINE CREATININE: CPT

## 2019-02-11 PROCEDURE — 82530 CORTISOL FREE: CPT

## 2019-02-11 PROCEDURE — 81050 URINALYSIS VOLUME MEASURE: CPT

## 2019-02-13 ENCOUNTER — OFFICE VISIT (OUTPATIENT)
Dept: HEALTH INFORMATION MANAGEMENT | Facility: MEDICAL CENTER | Age: 39
End: 2019-02-13
Payer: COMMERCIAL

## 2019-02-13 VITALS
HEIGHT: 64 IN | OXYGEN SATURATION: 96 % | HEART RATE: 98 BPM | DIASTOLIC BLOOD PRESSURE: 82 MMHG | BODY MASS INDEX: 40.62 KG/M2 | SYSTOLIC BLOOD PRESSURE: 120 MMHG | WEIGHT: 237.9 LBS

## 2019-02-13 DIAGNOSIS — C50.912 MALIGNANT NEOPLASM OF LEFT FEMALE BREAST, UNSPECIFIED ESTROGEN RECEPTOR STATUS, UNSPECIFIED SITE OF BREAST (HCC): ICD-10-CM

## 2019-02-13 DIAGNOSIS — E55.9 VITAMIN D DEFICIENCY: ICD-10-CM

## 2019-02-13 DIAGNOSIS — R73.01 IMPAIRED FASTING GLUCOSE: ICD-10-CM

## 2019-02-13 DIAGNOSIS — F41.1 GENERALIZED ANXIETY DISORDER WITH PANIC ATTACKS: ICD-10-CM

## 2019-02-13 DIAGNOSIS — R53.83 FATIGUE, UNSPECIFIED TYPE: ICD-10-CM

## 2019-02-13 DIAGNOSIS — E66.01 CLASS 3 SEVERE OBESITY WITHOUT SERIOUS COMORBIDITY IN ADULT, UNSPECIFIED BMI, UNSPECIFIED OBESITY TYPE (HCC): ICD-10-CM

## 2019-02-13 DIAGNOSIS — R63.5 WEIGHT GAIN: ICD-10-CM

## 2019-02-13 DIAGNOSIS — E66.01 MORBID OBESITY (HCC): ICD-10-CM

## 2019-02-13 DIAGNOSIS — F41.0 GENERALIZED ANXIETY DISORDER WITH PANIC ATTACKS: ICD-10-CM

## 2019-02-13 PROCEDURE — 97802 MEDICAL NUTRITION INDIV IN: CPT | Performed by: DIETITIAN, REGISTERED

## 2019-02-13 PROCEDURE — 93000 ELECTROCARDIOGRAM COMPLETE: CPT | Performed by: INTERNAL MEDICINE

## 2019-02-13 PROCEDURE — 99205 OFFICE O/P NEW HI 60 MIN: CPT | Mod: 25 | Performed by: INTERNAL MEDICINE

## 2019-02-13 RX ORDER — BUPROPION HYDROCHLORIDE 150 MG/1
150 TABLET, EXTENDED RELEASE ORAL DAILY
Status: SHIPPED | DISCHARGE
Start: 2019-02-13 | End: 2020-02-24

## 2019-02-13 NOTE — PROGRESS NOTES
Bariatric Medicine H&P  Chief Complaint   Patient presents with   • Weight Gain       Referred by:  Chuyita Roach P.*    History of Present Illness:   Pebbles Cosme is a 38 y.o.  female who presents for weight management and to help address co-morbidities related to overweight, including breast ca.    The patient would like to reduce her risk of breast cancer recurrence, help with her back pain and improve her overall health.  She has a breast reconstruction surgery coming up that she is preparing for.  She has tried different weight loss programs in the past, did not lose weight.  She has not used anti-obesity medication.  She has tracked her intake but did not find it helpful for weight loss.  When she does lose weight, she reduce his portion sizes and increases her exercise.    She is concerned she keeps gaining wt. she is most eager to lose weight before her reconstructive surgery in about 5 weeks.  Wants to consider anti-obesity medication, what aggressive measures she can take for further weight loss.  However, she is not interested in VLCD or meal replacements at this time.    A.m.: Smoothie or eggs with toast  Lunch: [place]  Dinner: Her largest meal, sandwich, cannot recall other meals  Snacks: Nuts, popcorn at night  Drinks: No alcohol, 3 coffee mugs per day, water throughout the day    On vitamin D repletion.  Recent thyroid testing nl by Endocrinology.  Getting vitamins and herbs with naturopathic doctor.  Stopping Ritalin, starting Wellbutrin, over seen by psychiatrist.    Behavior-Related History:  Binge eating screen: Positive  H/o abuse: Negative     Exercise:   Cardio, different exercises 4-5 days/week ( at [place] x 2 mos)  Was walking 30K steps/d at work (now not working)     Review of Systems   Positive for fatigue, lack of energy, insomnia, back pain with difficulty moving around.  Depression, anxiety.  Lack of energy.  Sleep apnea screen: Positive  All  "other ROS were reviewed with patient today and are negative.      PMH/PSH:  I have reviewed the patient's medical, social and family history, allergies, and medications today.  Prior records reviewed.  Personal Hx of Bariatric Surgery: Negative  UPS package , now on disability    Physical Exam:   /82 (BP Location: Left arm, Patient Position: Sitting, BP Cuff Size: Large adult)   Pulse 98   Ht 1.626 m (5' 4\")   Wt 107.9 kg (237 lb 14.4 oz)   SpO2 96%   BMI 40.84 kg/m²    Body fat % 51.7  REE 1722 kcal/day    Constitutional: Oriented to person, place, and time and well-developed, well-nourished, and in no distress.    HENT: No facial plethora.  No Cushingoid features.  No scalloped tongue.  No dental erosions.  No swollen parotids.  Head: Normocephalic.   Eyes: EOM are normal. Pupils are equal, round, and reactive to light. No periorbital edema.  No lateral thinning of eyebrows.  No vertical nystagmus.  Neck: Normal range of motion. Neck supple. No thyromegaly present. No buffalo hump.  Cardiovascular: Normal rate and regular rhythm.  No murmur heard.  Pulmonary/Chest: Effort normal and breath sounds normal. No wheezes.   Abdominal: Soft. Bowel sounds are normal. Grade 1 pannus.  No ascites.  No hepatosplenomegaly.   Musculoskeletal: Normal range of motion. No edema.   Neurological: Alert and oriented to person, place, and time. Normal reflexes. No cranial nerve deficit. No muscle weakness.  Gait normal.   Skin: Warm and dry. Not diaphoretic. No hirsuitism.  No acanthosis nigricans.  Not excessively dry, scaly.  No acne.  No bruising/ecchymosis.  No hyperpigmentation.  No xanthomas or acrochordon.    Psychiatric: Mood, memory, affect and judgment normal.     Laboratory:   Prior labs reviewed.  EKG: Sinus rhythm, early repolarization pattern.  Rate 84.  Corrected QT 0.445  Ordered and reviewed by me today.    Dietitian Assessment: I have reviewed the Dietitian's assessment related to this encounter. "       ASSESSMENT/PLAN:  Body mass index is 40.84 kg/m².   Obesity Stage ([place]) 3; Class 3    1. Morbid obesity (HCC)     2. Vitamin D deficiency     3. Fatigue, unspecified type     4. Malignant neoplasm of left female breast, unspecified estrogen receptor status, unspecified site of breast (HCC)     5. Generalized anxiety disorder with panic attacks     6. Impaired fasting glucose       Suspect the patient's total kcal and CHO intake quite high, leading to weight gain or at least inability to lose weight.  Resume tracking, to better quantify current intake even if she is done it in the past.  Strongly consider stimulus narrowing, which she defers at this time.  Would wait on anti-obesity medication, as she is due for reconstructive surgery in a month and will have to be off anti-obesity medication prior to that surgery in any case.  Reducing CHO intake will reduce her fat mass percent, reduce her risk of breast cancer recurrence.  Should also improve fatigue, anxiety, impaired fasting glucose.  Continue vitamin D repletion.  Continue current activity level.    It is unrealistic for her to expect rapid weight loss over the next month to meet her weight loss goals for surgery, especially if she is unwilling to follow some of our recommendations such as stimulus narrowing with meal replacements, tracking and CHO reduction.  Anti-obesity medication takes time to work, is an additional tool for weight loss along with food and behavioral changes which she needs to work on first.    The patient and I have discussed at length and agree to the following recommendations, which are all addressing the above diagnoses:    Weight Goal: 5% wt loss at one month after start (pt goal weight is less than 160 lb)  Diet:   Consider meal replacement for dinner daily  High Protein/Low Carb Meals and 2 snacks between meals daily  >100 g protein, <100 g total carbs daily  64+ oz water per day  Avoid sweet drinks and sodas  Track daily  intake with My Fitness Pal, bring to next visit  Physical Activity: Gym workouts  minutes 4-5 days/week  Risk level for moderate/vigorous exercise program:   Low  New Rx:   None  Consider at follow-up visit pending course  Avoid phentermine given history of panic attacks, anxiety disorder and given use of Adderral (Adderral to be d/c soon)  Behavior change:   Accept tracking to start  Follow-up: one month with MD, 2 wks with RD  Obtain recent CMP, Lipids    Face to face time spent 60 minutes,  with >50% of time devoted to one on one counseling on weight management issues, as documented above.      Patient's body mass index is 40.84 kg/m². Exercise and nutrition counseling were performed at this visit.        Thank you for your referral!

## 2019-02-13 NOTE — PROGRESS NOTES
"2/13/2019   Referring Provider: Pcp Pt States None  Pebbles Cosme 38 y.o.        Time in/out: 247-304 pm     Anthropometrics/Objective  Vitals:    02/13/19 1341   BP: 120/82   Weight: 107.9 kg (237 lb 14.4 oz)   Height: 1.626 m (5' 4\")     BMI: Body mass index is 40.84 kg/m².  Stated Goal Weight: <160 lbs   See comprehensive patient history form for further information     Subjective:  Pt is here today for the initial screening visit for the medical weight management program.   - states she has tried everything, nothing has worked, even has taken measurements and did not progress  - recently underwent breast CA treatment, wants to avoid reccurance and concerned with continued weight gain   - has tried tracked her intake using My Fitness Pal in the past   - exercising regularly  - admits she is often inconsistent with her behaviors, stays on track for a few weeks and then falls off   - not interested in meal replacements, prefers to have whole foods   - doesn't eat a lot during the day, mostly in the evening, dinner biggest meal     See Medical Questionnaire for more detailed diet history     Nutrition Diagnosis (PES Statement)  · Obesity related to excessive energy intake and inadequate energy expenditure as evidenced by BMI >30     Client history:  Condition(s) associated with a diagnosis or treatment (specify) IFG, hypothyroidism, thyroid nodules, breast CA    Biochemical data, medical test and procedures  Lab Results   Component Value Date/Time    HBA1C 5.0 02/07/2019 09:16 AM     No results found for: POCGLUCOSE  Lab Results   Component Value Date/Time    CHOLSTRLTOT 150 01/06/2017 11:42 AM    LDL 70 01/06/2017 11:42 AM    HDL 73 01/06/2017 11:42 AM    TRIGLYCERIDE 35 01/06/2017 11:42 AM         Nutrition Intervention  Nutrition Prescription  Recommended Daily Kcals: 1700 Kcal based on REE of 1722   Recommended Daily Protein: 100g or more per day per Dr. Pal   Recommended Daily CHO: 100g or less " per day per Dr. Pal       Meal Plan Recommendation   Low calorie, high protein/low CHO diet with 0 meal replacements per day per Dr. Pal     Comprehensive Nutrition Education Instruction or training leading to in-depth nutrition related knowledge about:   Benefits to following meal plan, Combine carb, protein and fat at each meal,  Meal timing and spacing, Portion control, Sweets and alcohol in moderation.    Handouts provided regarding topics discussed:   - LCD Plan   - MyPlate   - Snack list with fruit    Monitoring & Evaluation Plan    Behavioral-Environmental:  Behavior: Keep a food journal and bring to next appointment   Physical activity: Continue current exercise routine (cardio exercise/classes)     Food / Nutrient Intake:  Food intake: Follow meal plan as discussed. Avoid concentrated sweets and processed carbs. Use the plate method for portion control and macronutrient balance. Limit carbs/starch to up to 1/2 cup per meal. Snacks should be 1oz protein + ns veggies or fruit. Fruit once per day.     Fluid intake: Consume at least 64 oz water per day. Avoid all sweetened beverages. Limit coffee to 1 cup a day (ideally no cream or sugar). Avoid alcohol.      Physical Signs / Symptoms:  Weight change 1-2 lbs a week to goal       Assessment Notes:  Today I oriented Pebbles to Dr. Pal's Medical Weight Management program. Pebbles reports having tried everything, but also admitted that she often gets frustrated with lack of progress and then goes off track with her eating and exercise routine. It sounds like inconsistency with her habits is contributing to her difficulty losing weight. Discussed general program guidelines, the plate method and snack recommendations. Pebbles prefers to stick to whole foods instead of a meal replacement, but I did give her the option to use our guidelines to find a protein powder that has minimal ingredients or is plant based such as Naked Whey or Garden of Life.  This would likely be beneficial for her especially because she does not each much during the day which could be leading her up to making less healthful or larger portion choices in the evening. Recommended starting to tracking intake again although I am unsure how open to this idea she is as she states it did not help her in the past. Reviewed Dr. Sandoval's macronutrient recommendations of <100g carb and >100g protein per day.         F/U: 2 week w/ RD and 4-6 weeks with MD and RAYMOND

## 2019-02-14 ENCOUNTER — HOSPITAL ENCOUNTER (OUTPATIENT)
Dept: RADIOLOGY | Facility: MEDICAL CENTER | Age: 39
End: 2019-02-14
Attending: OBSTETRICS & GYNECOLOGY
Payer: COMMERCIAL

## 2019-02-14 DIAGNOSIS — Z80.41 FAMILY HISTORY OF OVARIAN CANCER: ICD-10-CM

## 2019-02-14 PROBLEM — E66.01 CLASS 3 SEVERE OBESITY WITHOUT SERIOUS COMORBIDITY IN ADULT (HCC): Status: RESOLVED | Noted: 2019-01-24 | Resolved: 2019-02-14

## 2019-02-14 PROBLEM — E66.813 CLASS 3 SEVERE OBESITY WITHOUT SERIOUS COMORBIDITY IN ADULT (HCC): Status: RESOLVED | Noted: 2019-01-24 | Resolved: 2019-02-14

## 2019-02-14 PROCEDURE — 76830 TRANSVAGINAL US NON-OB: CPT

## 2019-02-18 LAB
ANNOTATION COMMENT IMP: NORMAL
COLLECT DURATION TIME SPEC: 24 HRS
CORTIS F 24H UR HPLC-MCNC: 13.1 UG/L
CORTIS F 24H UR-MRATE: 39.3 UG/D
CORTIS F/CREAT 24H UR: 29.77 UG/G CRT
CREAT 24H UR-MCNC: 44 MG/DL
CREAT 24H UR-MRATE: 1320 MG/D (ref 700–1600)
SPECIMEN VOL ?TM UR: 2950 ML

## 2019-02-20 ENCOUNTER — OFFICE VISIT (OUTPATIENT)
Dept: URGENT CARE | Facility: PHYSICIAN GROUP | Age: 39
End: 2019-02-20
Payer: COMMERCIAL

## 2019-02-20 VITALS
TEMPERATURE: 98.4 F | DIASTOLIC BLOOD PRESSURE: 92 MMHG | HEIGHT: 64 IN | HEART RATE: 94 BPM | WEIGHT: 240 LBS | SYSTOLIC BLOOD PRESSURE: 140 MMHG | OXYGEN SATURATION: 97 % | BODY MASS INDEX: 40.97 KG/M2

## 2019-02-20 DIAGNOSIS — J02.9 PHARYNGITIS, UNSPECIFIED ETIOLOGY: ICD-10-CM

## 2019-02-20 LAB
INT CON NEG: NEGATIVE
INT CON POS: POSITIVE
S PYO AG THROAT QL: NORMAL

## 2019-02-20 PROCEDURE — 87880 STREP A ASSAY W/OPTIC: CPT | Performed by: PHYSICIAN ASSISTANT

## 2019-02-20 PROCEDURE — 99213 OFFICE O/P EST LOW 20 MIN: CPT | Performed by: PHYSICIAN ASSISTANT

## 2019-02-20 ASSESSMENT — ENCOUNTER SYMPTOMS
FEVER: 0
COUGH: 0
SINUS PAIN: 0
SHORTNESS OF BREATH: 0
SORE THROAT: 1
SWOLLEN GLANDS: 1

## 2019-02-21 NOTE — PROGRESS NOTES
Subjective:   Pebbles Cosme is a 38 y.o. female who presents today with   Chief Complaint   Patient presents with   • Sore Throat     x 2 days, requesting swab       Pharyngitis    This is a new problem. The current episode started yesterday. The problem has been unchanged. The pain is mild. Associated symptoms include a plugged ear sensation and swollen glands. Pertinent negatives include no coughing or shortness of breath.       PMH:  has a past medical history of Anxiety; Cancer (HCC) (11/2017); Pain; and Psychiatric problem. She also has no past medical history of ASTHMA; Diabetes; Hyperlipidemia; or Hypertension.  MEDS:   Current Outpatient Prescriptions:   •  buPROPion SR (WELLBUTRIN-SR) 150 MG TABLET SR 12 HR sustained-release tablet, Take 1 Tab by mouth every day., Disp: , Rfl:   •  Melatonin 10 MG Cap, Take 20 mg by mouth., Disp: , Rfl:   •  omeprazole (PRILOSEC) 40 MG delayed-release capsule, TK 1 C PO QD, Disp: , Rfl: 6  •  methylphenidate (RITALIN) 5 MG Tab, Take 1 Tab by mouth 2 Times a Day., Disp: , Rfl: 0  •  ferrous sulfate 325 (65 Fe) MG tablet, Take 1 Tab by mouth every morning with breakfast., Disp: 30 Tab, Rfl: 12  •  TURMERIC CURCUMIN PO, Take 1 Tab by mouth every day., Disp: , Rfl:   •  multivitamin (THERAGRAN) Tab, Take 1 Tab by mouth every day., Disp: , Rfl:   •  vitamin D, Ergocalciferol, (DRISDOL) 24738 units Cap capsule, Take 50,000 Units by mouth every 7 days., Disp: , Rfl:   •  Ascorbic Acid (VITAMIN C) Powder, Take 300 g by mouth., Disp: , Rfl:   ALLERGIES: No Known Allergies  SURGHX:   Past Surgical History:   Procedure Laterality Date   • BREAST IMPLANT REMOVAL Right 6/18/2018    Procedure: BREAST EXPANDER  REMOVAL;  Surgeon: Niki Osuna M.D.;  Location: SURGERY [facility];  Service: Plastics   • FLAP GRAFT Right 5/16/2018    Procedure: FLAP GRAFT-   ADJACENT TISSUE TRANSFER OR REARRANGEMENT, TRUNK;  Surgeon: Niki Osuna M.D.;  Location: SURGERY SAME DAY  "[facility];  Service: Plastics   • OTHER  04/2018    bilateral mastectomy   • OTHER  03/2018    lymph node dissection   • OTHER  10/11/2017    port placement   • OPEN REDUCTION      R foot   • OTHER      wisdom teeth removed     SOCHX:  reports that she has quit smoking. Her smoking use included Cigarettes. She smoked 0.25 packs per day. She has never used smokeless tobacco. She reports that she does not drink alcohol or use drugs.  FH: Reviewed with patient, not pertinent to this visit.       Review of Systems   Constitutional: Negative for fever.   HENT: Positive for sore throat. Negative for sinus pain.    Respiratory: Negative for cough and shortness of breath.    All other systems reviewed and are negative.       Objective:   /92   Pulse 94   Temp 36.9 °C (98.4 °F)   Ht 1.626 m (5' 4\")   Wt 108.9 kg (240 lb)   SpO2 97%   BMI 41.20 kg/m²   Physical Exam   Constitutional: She is oriented to person, place, and time. She appears well-developed and well-nourished.   HENT:   Head: Normocephalic and atraumatic.   Mouth/Throat: Posterior oropharyngeal edema and posterior oropharyngeal erythema present.   Eyes: Pupils are equal, round, and reactive to light.   Cardiovascular: Normal rate, regular rhythm and normal heart sounds.    Pulmonary/Chest: Effort normal and breath sounds normal.   Musculoskeletal: Normal range of motion.   Lymphadenopathy:     She has cervical adenopathy.   Neurological: She is alert and oriented to person, place, and time.   Skin: Skin is warm and dry.   Psychiatric: She has a normal mood and affect.   Nursing note and vitals reviewed.    POCT Strep Neg    Assessment/Plan:   Assessment    1. Pharyngitis, unspecified etiology  - POCT Rapid Strep A  Patient encouraged to get plenty of rest, and drink plenty of fluids.  Recommend OTC remedies for sore throat including lozenges, salt water gargles.  Differential diagnosis, natural history, supportive care, and indications for " immediate follow-up discussed.     If not improving in 3-5 days, F/U with PCP or return to UC if symptoms worsen.    Patient agreeable to plan.      Fredy Escobar PA-C

## 2019-03-05 ENCOUNTER — OFFICE VISIT (OUTPATIENT)
Dept: ENDOCRINOLOGY | Facility: MEDICAL CENTER | Age: 39
End: 2019-03-05
Payer: COMMERCIAL

## 2019-03-05 ENCOUNTER — HOSPITAL ENCOUNTER (OUTPATIENT)
Dept: LAB | Facility: MEDICAL CENTER | Age: 39
End: 2019-03-05
Attending: OBSTETRICS & GYNECOLOGY
Payer: COMMERCIAL

## 2019-03-05 VITALS
RESPIRATION RATE: 16 BRPM | SYSTOLIC BLOOD PRESSURE: 130 MMHG | HEIGHT: 64 IN | BODY MASS INDEX: 40.29 KG/M2 | OXYGEN SATURATION: 95 % | WEIGHT: 236 LBS | HEART RATE: 95 BPM | DIASTOLIC BLOOD PRESSURE: 68 MMHG

## 2019-03-05 DIAGNOSIS — E03.8 SUBCLINICAL HYPOTHYROIDISM: ICD-10-CM

## 2019-03-05 DIAGNOSIS — E66.01 MORBID OBESITY (HCC): ICD-10-CM

## 2019-03-05 DIAGNOSIS — E04.2 MULTIPLE THYROID NODULES: ICD-10-CM

## 2019-03-05 DIAGNOSIS — R53.83 FATIGUE, UNSPECIFIED TYPE: ICD-10-CM

## 2019-03-05 DIAGNOSIS — R73.01 IMPAIRED FASTING GLUCOSE: ICD-10-CM

## 2019-03-05 DIAGNOSIS — E55.9 VITAMIN D DEFICIENCY: ICD-10-CM

## 2019-03-05 PROCEDURE — 99213 OFFICE O/P EST LOW 20 MIN: CPT | Performed by: PHYSICIAN ASSISTANT

## 2019-03-05 PROCEDURE — 88175 CYTOPATH C/V AUTO FLUID REDO: CPT

## 2019-03-05 PROCEDURE — 87624 HPV HI-RISK TYP POOLED RSLT: CPT

## 2019-03-05 NOTE — PROGRESS NOTES
Endocrinology Clinic Progress Note  PCP: Pcp Pt States None    HPI:  Pebbles Cosme is a 38 y.o. old patient who comes in today for review of endocrine problems.    1. Subclinical hypothyroidism  Patient continues to be frustrated by weight and fatigue. She believes she should be feeling better than she is. She is scheduled for surgery in the near future for breast flap.     She was started on Wellbutrin recently and she does believe that this is helping with her fatigue.    2. Multiple thyroid nodules    Patient denies symptoms of sensation of a throat mass, voice changes, hoarseness, neck pain, or difficulty swallowing, dysphonia, cough, enlarged cervical lymph nodes, sweating, tremors, heat intolerance.       3. Impaired fasting glucose  Working with [facility] re: diet     4. Morbid obesity (HCC)  Patient was seen by [facility]. Tomorrow appt with diet/Dr. ARCHER later this month.   Using a 1600 kCal /diet     5. Vitamin D deficiency  March 27, 2019- [facility]MA ([place] Flap)         Endocrine history to date:    1. Multiple thyroid nodules  Patient was recently treated with neck radiation for her breast.    She denies any family history of thyroid cancer.      Thyroid ultrasound 11/5/2018 (compared to November 2017)  Right thyroid lobe contains a well-circumscribed hypoechoic cystic nodule which measures 7 mm x 6 mm x 5 mm.  Left lobe contains multiple hypoechoic nodules the largest measuring 1.0 cm x 0.6 cm and 0.5 cm.    The left thyroid lobe also contains a posterior hypoechoic cystic nodule which measures 1.2 x 0.8 x 0.8 lateral dimension which appears stable.  When compared to prior examination of the left lobe of the thyroid gland exhibits multiple new hypoechoic nodules the largest measuring 1 cm in greatest dimension.  She does have bilateral cystic nodules     2. Subclinical hypothyroidism  Abnormal thyroid labs with potential symptoms consistent with subclinical hypothyroidism.   2/7/19- TSH 2.160, FT4 1.02, FT3  3.65   12/13/2018 TSH 2.360 free T4 0.91 free T3 3.43  10/2018 TSH 2.890  11/2017 TSH 3.9 free T4 0.74     3. Class 3 severe obesity without serious comorbidity in adult, unspecified BMI, unspecified obesity type (HCC)  Patient previously was very active.  She does go to [employer].  She is involved in a program with regards to weight management.  She has seen a  in the past.     2/7/19- ACTH 11, A1c 5.0  11/1/2018 glucose 100        4. Fatigue, unspecified type-vitamin D deficiency  Prev: treated with Ritalin secondary to fatigue by her oncologist.  She is currently not taking Ritalin.  2/7/19-TSH 2.160 free T4 1.02 free T3 3.65, TPO 0.3, B12 494, cortisol 11.6  2/26/2018 vitamin D 28  6/19/2018 vitamin B12 694    Vitamin D borderline  2/7/2019 vitamin D 30    ROS:  Constitutional: No weight loss,  fever  HEENT: No difficulty with swallowing, change in voice, or swelling in throat area   Cardiac: No chest pain, palpitations, or racing heart  Resp: No shortness of breath  GI: No abdominal pain, nausea, vomiting, or diarrhea   Neuro: No numbness or tinging in feet  Endo: No heat or cold intolerance, no polyuria or polydipsia      Past Medical History:  Patient Active Problem List    Diagnosis Date Noted   • Infected breast tissue expander (HCC) 06/15/2018     Priority: High   • Breast cancer (HCC) 06/15/2018     Priority: Medium   • Generalized anxiety disorder with panic attacks 08/07/2017     Priority: Low   • Impaired fasting glucose 02/13/2019   • Morbid obesity (HCC) 02/13/2019   • Weight gain 02/13/2019   • Multiple thyroid nodules 01/24/2019   • Subclinical hypothyroidism 01/24/2019   • Fatigue 01/24/2019   • Vitamin D deficiency 01/24/2019   • BRCA gene mutation positive in female 06/19/2018   • H/O LEEP 06/01/2015   • Family hx of ovarian malignancy 06/01/2015   • Decreased energy 06/01/2015   • Insomnia 06/01/2015   • Anxiety 06/01/2015       Medications:    Current Outpatient Prescriptions:   •   "Pembrolizumab (KEYTRUDA IV), by Intravenous route., Disp: , Rfl:   •  buPROPion SR (WELLBUTRIN-SR) 150 MG TABLET SR 12 HR sustained-release tablet, Take 1 Tab by mouth every day., Disp: , Rfl:   •  Ascorbic Acid (VITAMIN C) Powder, Take 300 g by mouth., Disp: , Rfl:   •  Melatonin 10 MG Cap, Take 20 mg by mouth., Disp: , Rfl:   •  omeprazole (PRILOSEC) 40 MG delayed-release capsule, TK 1 C PO QD, Disp: , Rfl: 6  •  methylphenidate (RITALIN) 5 MG Tab, Take 1 Tab by mouth 2 Times a Day., Disp: , Rfl: 0  •  ferrous sulfate 325 (65 Fe) MG tablet, Take 1 Tab by mouth every morning with breakfast., Disp: 30 Tab, Rfl: 12  •  TURMERIC CURCUMIN PO, Take 1 Tab by mouth every day., Disp: , Rfl:   •  multivitamin (THERAGRAN) Tab, Take 1 Tab by mouth every day., Disp: , Rfl:   •  vitamin D, Ergocalciferol, (DRISDOL) 09406 units Cap capsule, Take 50,000 Units by mouth every 7 days., Disp: , Rfl:     Labs: Reviewed as above     Physical Examination:  Vital signs: /68 (BP Location: Left arm, Patient Position: Sitting, BP Cuff Size: Large adult)   Pulse 95   Resp 16   Ht 1.626 m (5' 4\")   Wt 107 kg (236 lb)   SpO2 95%   BMI 40.51 kg/m²  Body mass index is 40.51 kg/m².  General: No apparent distress, cooperative, happy   Eyes: No scleral icterus, no discharge, normal eyelids  Neck: No abnormal masses on inspection, normal thyroid exam  Resp: Normal effort, clear to auscultation bilaterally  CVS: Regular rate and rhythm,   Extremities: No lower extremity edema  Abdomen: abdominal obesity present  Musculoskeletal: Normal digits and nails  Skin: No rash on visible skin  Psych: Alert and oriented, normal mood and affect, intact memory and able to make informed decisions.    Assessment and Plan:    1. Subclinical hypothyroidism  R/o other etiologies related to fatigue.   Patient is not currently on thyroid hormone replacement. Recently started on Wellbutrin which she believes is helpful.   Will reassess her fatigue post " surgery.  Labs ordered prior to next evaluation     2. Multiple thyroid nodules  Will recheck US     3. Impaired fasting glucose  Continue to work on diet and exercise     4. Morbid obesity (HCC)  Working with [facility]     5. Vitamin D deficiency  Increase Vitamin d to 5000 IU daily       Return in about 2 months (around 5/5/2019).  Post surgery.    Thank you for allowing me to participate in the care of this patient.  If you have any questions or concerns please do not hesitate to contact me.    Chuyita Roach P.A.-C.    CC:   Pcp Pt States None    This note was created using voice recognition software (Dragon). The accuracy of the dictation is limited by the abilities of the software. I have reviewed the note prior to signing, however some errors in grammar and context are still possible. If you have any questions related to this note please do not hesitate to contact our office.

## 2019-03-06 ENCOUNTER — NON-PROVIDER VISIT (OUTPATIENT)
Dept: HEALTH INFORMATION MANAGEMENT | Facility: MEDICAL CENTER | Age: 39
End: 2019-03-06
Payer: COMMERCIAL

## 2019-03-06 DIAGNOSIS — E66.01 MORBID OBESITY (HCC): ICD-10-CM

## 2019-03-06 PROCEDURE — 97802 MEDICAL NUTRITION INDIV IN: CPT | Performed by: DIETITIAN, REGISTERED

## 2019-03-07 LAB
CYTOLOGY REG CYTOL: NORMAL
HPV HR 12 DNA CVX QL NAA+PROBE: NEGATIVE
HPV16 DNA SPEC QL NAA+PROBE: NEGATIVE
HPV18 DNA SPEC QL NAA+PROBE: NEGATIVE
SPECIMEN SOURCE: NORMAL

## 2019-03-07 NOTE — PROGRESS NOTES
Nutrition Reassess: Medical Weight Management   Today's date: 3/7/2019  Referring Provider: RUPAL Conklin  Time: in/out 2:10-2:33 pm  Visit#: 2    Subjective:   - Finds tracking helpful, has set to 1600 kcal/d   - She states she has added healthier foods to her diet and has reduced her CHO intake   - Continues to exercise regularly and is adding these estimated kcals back as it was explained to her that her REE of 1700 is if she were laying in bed doing nothing all day, but since she is so active in the gym, she figures she should be able to have more than this.    - Having sx in 2 weeks and would like to try high protein meal replacements    - Is happy where she is at right now in terms of her plan     Diet history:   Breakfast - smoothie w/ banana, flaxmilk, coconut water, triple berry blend and  [employer]  Snack - none  Lunch - 1/2 cup tuna salad and 2 slices bread  Snack - cheese  Dinner - salmon and broccoli    Anthropometrics/Objective  Today's weight:  There were no vitals filed for this visit.  BMI:  There is no height or weight on file to calculate BMI.  Starting weight/date 237.4 lb (2/13/19)     Total weight change : NA            Meal Plan:   Low CHO / high protein / calorie controlled diet per Dr. Pal with 0-1 meal replacements per day. Discussed her options with protein today, is going to try [employer].     ReAssesment/Notes:  Review of her MyFitnessPal shows calorie intake of 1566, 1907, 1335, 1685; CHO intake of 99, 132, 132, 152; protein intake of 85, 128, 113, 105. Discussed not putting too much stock in the estimated kcals burned from exercise and to avoid replacing the kcals with additional intake. Discussed her AM smoothie and reducing the CHO in this and adding in a protein. She plans to stay as active as possible following her sx and has already asked the doctors what types of exercises she will be able to do as she is healing.     Follow-up: 2 weeks w/ MD and RAYMOND

## 2019-04-12 ENCOUNTER — APPOINTMENT (OUTPATIENT)
Dept: OTHER | Facility: IMAGING CENTER | Age: 39
End: 2019-04-12

## 2019-04-29 ENCOUNTER — HOSPITAL ENCOUNTER (OUTPATIENT)
Dept: RADIOLOGY | Facility: MEDICAL CENTER | Age: 39
End: 2019-04-29
Attending: PHYSICIAN ASSISTANT
Payer: COMMERCIAL

## 2019-04-29 DIAGNOSIS — E04.2 MULTIPLE THYROID NODULES: ICD-10-CM

## 2019-04-29 PROCEDURE — 76536 US EXAM OF HEAD AND NECK: CPT

## 2019-06-06 ENCOUNTER — APPOINTMENT (OUTPATIENT)
Dept: ENDOCRINOLOGY | Facility: MEDICAL CENTER | Age: 39
End: 2019-06-06
Payer: COMMERCIAL

## 2019-06-28 ENCOUNTER — OFFICE VISIT (OUTPATIENT)
Dept: ENDOCRINOLOGY | Facility: MEDICAL CENTER | Age: 39
End: 2019-06-28
Payer: COMMERCIAL

## 2019-06-28 VITALS
BODY MASS INDEX: 36.7 KG/M2 | HEIGHT: 64 IN | HEART RATE: 109 BPM | DIASTOLIC BLOOD PRESSURE: 80 MMHG | WEIGHT: 215 LBS | SYSTOLIC BLOOD PRESSURE: 124 MMHG | OXYGEN SATURATION: 99 %

## 2019-06-28 DIAGNOSIS — E55.9 VITAMIN D DEFICIENCY: ICD-10-CM

## 2019-06-28 DIAGNOSIS — R73.01 IMPAIRED FASTING GLUCOSE: ICD-10-CM

## 2019-06-28 DIAGNOSIS — R53.83 FATIGUE, UNSPECIFIED TYPE: ICD-10-CM

## 2019-06-28 DIAGNOSIS — E04.2 MULTIPLE THYROID NODULES: ICD-10-CM

## 2019-06-28 DIAGNOSIS — E03.8 SUBCLINICAL HYPOTHYROIDISM: ICD-10-CM

## 2019-06-28 PROCEDURE — 99214 OFFICE O/P EST MOD 30 MIN: CPT | Performed by: PHYSICIAN ASSISTANT

## 2019-06-28 RX ORDER — DEXTROAMPHETAMINE SACCHARATE, AMPHETAMINE ASPARTATE MONOHYDRATE, DEXTROAMPHETAMINE SULFATE AND AMPHETAMINE SULFATE 2.5; 2.5; 2.5; 2.5 MG/1; MG/1; MG/1; MG/1
10 CAPSULE, EXTENDED RELEASE ORAL EVERY MORNING
COMMUNITY
End: 2020-02-24

## 2019-06-28 RX ORDER — LEVOTHYROXINE SODIUM 0.03 MG/1
TABLET ORAL
Qty: 15 TAB | Refills: 3 | Status: SHIPPED | OUTPATIENT
Start: 2019-06-28 | End: 2019-08-15

## 2019-06-28 NOTE — PROGRESS NOTES
Endocrinology Clinic Progress Note  PCP: Pcp Pt States None    HPI:  Pebbles Cosme is a 38 y.o. old patient who comes in today for review of endocrine problems.    1. Subclinical hypothyroidism  Patient with increased fatigue. She recently had her reconstructive surgery May 8, 2019 and did well. According to th patient, her oncologist was concerned as her rising thyroid labs may be cause for elimination from breast cancer study.  Patient was seen by [facility].     Patient had reconstructive surgery May 8 2019.     Patient did not have my labs for today's evaluation.   Patient had last labs 6/17/2019- TSH 4.160 (for trial labs)      2. Multiple thyroid nodules    Patient denies symptoms of sensation of a throat mass, voice changes, hoarseness, neck pain, or difficulty swallowing, dysphonia, cough, enlarged cervical lymph nodes, sweating, tremors, heat intolerance.      No new family history of thyroid cancer     Thyroid US 4/29/2019  Nodule #1  Location:  Right  lower  Size:  0.78 x 0.67 x 0.78 cm  Composition:  Cystic-0  Echogenicity:  Hypoechoic-2  Shape:  Wider than tall-0  Margins:  Smooth-0  Echogenic Foci:  Macroscopic-1    Nodule #2   Location:  Left  mid  Size:  1.12 x 0.62 x 0.74 cm  Composition:  Cystic-0  Echogenicity:  Hypoechoic-2  Shape:  Wider than tall-0  Margins:  Smooth-0  Echogenic Foci:  Macroscopic-1    Nodule #3  Location:  Left  mid  Size:  1.13 x 0.59 x 0.73 cm  Composition:  Solid-2  Echogenicity:  Isoechoic-1  Shape:  Wider than tall-0  Margins:  Smooth-0  Echogenic Foci:  None-0    3. Impaired fasting glucose  Working with [facility] with regards to lifestyle modifications.     4. Morbid obesity (HCC)  6/19- 215    Patient was seen by [facility] 4/29/2019. She is scheduled for follow up in the near future.   Using a 1600 kCal /diet   She has added healthier foods to her diet and reduced CHO intake. It was recommended to try protein shake.     According the patient, since being on Wellbutrin and  Adderall they are reluctant to use any weight loss medications.     5. Vitamin D deficiency  Previously Vitamin D 50 k weekly   Patient did not have Vitamin D labs preformed.     Endocrine history to date:    1. Multiple thyroid nodules  Patient was recently treated with neck radiation for her breast.      Thyroid US 4/29/2019  Nodule #1  Location:  Right  lower  Size:  0.78 x 0.67 x 0.78 cm  Composition:  Cystic-0  Echogenicity:  Hypoechoic-2  Shape:  Wider than tall-0  Margins:  Smooth-0  Echogenic Foci:  Macroscopic-1    Nodule #2   Location:  Left  mid  Size:  1.12 x 0.62 x 0.74 cm  Composition:  Cystic-0  Echogenicity:  Hypoechoic-2  Shape:  Wider than tall-0  Margins:  Smooth-0  Echogenic Foci:  Macroscopic-1    Nodule #3  Location:  Left  mid  Size:  1.13 x 0.59 x 0.73 cm  Composition:  Solid-2  Echogenicity:  Isoechoic-1  Shape:  Wider than tall-0  Margins:  Smooth-0  Echogenic Foci:  None-0     Thyroid ultrasound 11/5/2018 (compared to November 2017)  Right thyroid lobe contains a well-circumscribed hypoechoic cystic nodule which measures 7 mm x 6 mm x 5 mm.    Left lobe contains multiple hypoechoic nodules the largest measuring 1.0 cm x 0.6 cm and 0.5 cm.    The left thyroid lobe also contains a posterior hypoechoic cystic nodule which measures 1.2 x 0.8 x 0.8 lateral dimension which appears stable.  When compared to prior examination of the left lobe of the thyroid gland exhibits multiple new hypoechoic nodules the largest measuring 1 cm in greatest dimension.  She does have bilateral cystic nodules     2. Subclinical hypothyroidism  Started 12.5 mcg daily T4 - 6/29/19-   6/17/2019- TSH 4.160 (for trial labs)  2/7/19- TSH 2.160, FT4 1.02, FT3 3.65   12/13/2018 TSH 2.360 free T4 0.91 free T3 3.43  10/2018 TSH 2.890  11/2017 TSH 3.9 free T4 0.74     3. Class 3 severe obesity without serious comorbidity in adult, unspecified BMI, unspecified obesity type (HCC)  6/19- Weight 215    2/7/19- ACTH 11, A1c  5.0  11/1/2018 glucose 100        4. Fatigue, unspecified type-vitamin D deficiency  Prev: treated with Ritalin rx oncologist.      2/7/19-TSH 2.160 free T4 1.02 free T3 3.65, TPO 0.3, B12 494, cortisol 11.6  2/26/2018 vitamin D 28  6/19/2018 vitamin B12 694    5. Vitamin D borderline  Started on Vitamin D 50k weekly 5/18 2/7/2019 vitamin D 30    ROS:  Constitutional: No weight loss,  fever  HEENT: No difficulty with swallowing, change in voice, or swelling in throat area   Cardiac: No chest pain, palpitations, or racing heart  Resp: No shortness of breath  GI: No abdominal pain, nausea, vomiting, or diarrhea   Neuro: No numbness or tinging in feet  Endo: No heat or cold intolerance, no polyuria or polydipsia      Past Medical History:  Patient Active Problem List    Diagnosis Date Noted   • Infected breast tissue expander (HCC) 06/15/2018     Priority: High   • Breast cancer (HCC) 06/15/2018     Priority: Medium   • Generalized anxiety disorder with panic attacks 08/07/2017     Priority: Low   • Impaired fasting glucose 02/13/2019   • Morbid obesity (HCC) 02/13/2019   • Weight gain 02/13/2019   • Multiple thyroid nodules 01/24/2019   • Subclinical hypothyroidism 01/24/2019   • Fatigue 01/24/2019   • Vitamin D deficiency 01/24/2019   • BRCA gene mutation positive in female 06/19/2018   • H/O LEEP 06/01/2015   • Family hx of ovarian malignancy 06/01/2015   • Decreased energy 06/01/2015   • Insomnia 06/01/2015   • Anxiety 06/01/2015       Medications:    Current Outpatient Prescriptions:   •  amphetamine-dextroamphetamine XR (ADDERALL XR) 10 MG CAPSULE SR 24 HR, Take 10 mg by mouth every morning., Disp: , Rfl:   •  levothyroxine (SYNTHROID) 25 MCG Tab, 1/2 tablet once a day DAW1, Disp: 15 Tab, Rfl: 3  •  Pembrolizumab (KEYTRUDA IV), by Intravenous route., Disp: , Rfl:   •  buPROPion SR (WELLBUTRIN-SR) 150 MG TABLET SR 12 HR sustained-release tablet, Take 1 Tab by mouth every day. (Patient taking differently: Take 300  "mg by mouth every day.), Disp: , Rfl:   •  omeprazole (PRILOSEC) 40 MG delayed-release capsule, TK 1 C PO QD, Disp: , Rfl: 6  •  TURMERIC CURCUMIN PO, Take 1 Tab by mouth every day., Disp: , Rfl:   •  multivitamin (THERAGRAN) Tab, Take 1 Tab by mouth every day., Disp: , Rfl:   •  vitamin D, Ergocalciferol, (DRISDOL) 24820 units Cap capsule, Take 50,000 Units by mouth every 7 days., Disp: , Rfl:   •  Ascorbic Acid (VITAMIN C) Powder, Take 300 g by mouth., Disp: , Rfl:   •  Melatonin 10 MG Cap, Take 20 mg by mouth., Disp: , Rfl:   •  methylphenidate (RITALIN) 5 MG Tab, Take 1 Tab by mouth 2 Times a Day., Disp: , Rfl: 0  •  ferrous sulfate 325 (65 Fe) MG tablet, Take 1 Tab by mouth every morning with breakfast. (Patient not taking: Reported on 6/28/2019), Disp: 30 Tab, Rfl: 12    Labs: Reviewed as above     Physical Examination:  Vital signs: /80 (BP Location: Right arm, Patient Position: Sitting, BP Cuff Size: Adult)   Pulse (!) 109   Ht 1.626 m (5' 4\")   Wt 97.5 kg (215 lb)   SpO2 99%   BMI 36.90 kg/m²  Body mass index is 36.9 kg/m².  General: No apparent distress, cooperative,   Eyes: No scleral icterus, no discharge, normal eyelids  Neck: No abnormal masses on inspection, normal thyroid exam  Resp: Normal effort, clear to auscultation bilaterally  CVS: Regular rate and rhythm,   Musculoskeletal: Normal digits and nails  Psych: Alert and oriented, normal mood and affect, intact memory and able to make informed decisions.    Assessment and Plan:  1. Subclinical hypothyroidism  Reviewed and discussed TSH results. We discussed effects of stress, hospitalization, antibiotics re: thyroid assay. Would like to have her get labs including FT4 and FT3 supplementation.   Secondary to her fatigue, will start low dose thyroid supplement. Patient was started on 12.5 mcg daily   Samples   Synthroid 12.5 mcg daily   Discussed taking on daily basis first thing in the morning on empty stomach    2. Multiple thyroid " nodules  We discussed etiology and pathophysiology re: thyroid nodules. We discussed some growth on thyroid nodules. We discussed ? Referral for biopsy vs. Repeat US at this time will plan to repeat thyroid US 10/19.       3. Impaired fasting glucose  Continue to work on diet and exercise   Patient is following with [facility]   We discussed ? Addition of GLP (Saxenda). May discuss Metformin 2000 mg for insulin resistance.     4. Morbid obesity (HCC)  Working with [facility]  Continue      5. Vitamin D deficiency  Previously was to start Vitamin d to 5000 IU daily   No labs done by patient for today. Will recheck prior to next evaluation.   Discussed benefit of Vitamin D replacement on fatigue and pain      Return in about 6 weeks (around 8/9/2019).      Thank you for allowing me to participate in the care of this patient.  If you have any questions or concerns please do not hesitate to contact me.    Chuyita Roach P.A.-C.    CC:   Pcp Pt States None    This note was created using voice recognition software (Dragon). The accuracy of the dictation is limited by the abilities of the software. I have reviewed the note prior to signing, however some errors in grammar and context are still possible. If you have any questions related to this note please do not hesitate to contact our office.

## 2019-08-15 ENCOUNTER — OFFICE VISIT (OUTPATIENT)
Dept: ENDOCRINOLOGY | Facility: MEDICAL CENTER | Age: 39
End: 2019-08-15
Payer: COMMERCIAL

## 2019-08-15 VITALS
HEART RATE: 110 BPM | DIASTOLIC BLOOD PRESSURE: 74 MMHG | SYSTOLIC BLOOD PRESSURE: 122 MMHG | BODY MASS INDEX: 36.54 KG/M2 | WEIGHT: 214 LBS | HEIGHT: 64 IN | OXYGEN SATURATION: 96 %

## 2019-08-15 DIAGNOSIS — E04.2 MULTIPLE THYROID NODULES: ICD-10-CM

## 2019-08-15 DIAGNOSIS — R63.5 WEIGHT GAIN: ICD-10-CM

## 2019-08-15 DIAGNOSIS — E03.9 HYPOTHYROIDISM, UNSPECIFIED TYPE: ICD-10-CM

## 2019-08-15 DIAGNOSIS — R53.83 FATIGUE, UNSPECIFIED TYPE: ICD-10-CM

## 2019-08-15 DIAGNOSIS — R73.01 IMPAIRED FASTING GLUCOSE: ICD-10-CM

## 2019-08-15 LAB
25(OH)D3+25(OH)D2 SERPL-MCNC: 47.3 NG/ML (ref 30–100)
T3FREE SERPL-MCNC: 3.4 PG/ML (ref 2–4.4)
THYROPEROXIDASE AB SERPL-ACNC: 15 IU/ML (ref 0–34)
VIT B12 SERPL-MCNC: 443 PG/ML (ref 232–1245)

## 2019-08-15 PROCEDURE — 99213 OFFICE O/P EST LOW 20 MIN: CPT | Performed by: PHYSICIAN ASSISTANT

## 2019-08-15 RX ORDER — LEVOTHYROXINE SODIUM 0.03 MG/1
25 TABLET ORAL
Qty: 30 TAB | Refills: 3 | Status: SHIPPED | OUTPATIENT
Start: 2019-08-15 | End: 2019-11-07

## 2019-08-15 NOTE — PROGRESS NOTES
Endocrinology Clinic Progress Note  PCP: Pcp Pt States None    HPI:  Pebbles Cosme is a 38 y.o. old patient who comes in today for review of endocrine problems.    1. Subclinical hypothyroidism  Patient with increased fatigue.   Patient is healing from breast surgery.  May 8, 2019  She has variable symptoms today.  She continues to complain of fatigue however does notice that this is slightly improved from prior.  She is intermittent symptoms of hot and cold and has difficulty regulating her body temperature.    Patient had last labs   July 30- TSH 3.28 ([facility] trial labs)  6/17/2019- TSH 4.160 (for trial labs)      2. Multiple thyroid nodules  Patient denies symptoms of sensation of a throat mass, voice changes, hoarseness, neck pain, or difficulty swallowing, dysphonia, cough, enlarged cervical lymph nodes, sweating, tremors, heat intolerance.      No new family history of thyroid cancer     Last ultrasound thyroid US 4/29/2019    3. Impaired fasting glucose  Patient is no longer working with health improvement program.  According to the patient she is following diet recommendations as previously discussed with them.    4. Morbid obesity (HCC)  8/2019 weight 214 stable from prior  6/19- 215    HIP 4/29/2019.   Using a 1600 kCal /diet     According the patient, since being on Wellbutrin and Adderall they are reluctant to use any weight loss medications.     5. Vitamin D deficiency  Previously Vitamin D 50 k weekly   Patient did not have Vitamin D labs preformed.     Endocrine history to date:    1. Multiple thyroid nodules  Patient was recently treated with neck radiation for her breast.      Thyroid US 4/29/2019  Nodule #1  Location:  Right  lower  Size:  0.78 x 0.67 x 0.78 cm  Composition:  Cystic-0  Echogenicity:  Hypoechoic-2  Shape:  Wider than tall-0  Margins:  Smooth-0  Echogenic Foci:  Macroscopic-1    Nodule #2   Location:  Left  mid  Size:  1.12 x 0.62 x 0.74 cm  Composition:   Cystic-0  Echogenicity:  Hypoechoic-2  Shape:  Wider than tall-0  Margins:  Smooth-0  Echogenic Foci:  Macroscopic-1    Nodule #3  Location:  Left  mid  Size:  1.13 x 0.59 x 0.73 cm  Composition:  Solid-2  Echogenicity:  Isoechoic-1  Shape:  Wider than tall-0  Margins:  Smooth-0  Echogenic Foci:  None-0     Thyroid ultrasound 11/5/2018 (compared to November 2017)  Right thyroid lobe contains a well-circumscribed hypoechoic cystic nodule which measures 7 mm x 6 mm x 5 mm.    Left lobe contains multiple hypoechoic nodules the largest measuring 1.0 cm x 0.6 cm and 0.5 cm.    The left thyroid lobe also contains a posterior hypoechoic cystic nodule which measures 1.2 x 0.8 x 0.8 lateral dimension which appears stable.  When compared to prior examination of the left lobe of the thyroid gland exhibits multiple new hypoechoic nodules the largest measuring 1 cm in greatest dimension.  She does have bilateral cystic nodules     2. Subclinical hypothyroidism  6/29/2019 T4 12.5 mcg daily     6/17/2019- TSH 4.160 (for trial labs)  2/7/19- TSH 2.160, FT4 1.02, FT3 3.65   12/13/2018 TSH 2.360 free T4 0.91 free T3 3.43  10/2018 TSH 2.890  11/2017 TSH 3.9 free T4 0.74     3. Class 3 severe obesity without serious comorbidity in adult, unspecified BMI, unspecified obesity type (HCC)  8/2019 weight 214  6/19- Weight 215    2/7/19- ACTH 11, A1c 5.0  11/1/2018 glucose 100     4. Vitamin D borderline  Started on Vitamin D 50k weekly 5/18 2/7/2019 vitamin D 30  2/26/2018 vitamin D 28  6/19/2018 vitamin B12 694    ROS:  Constitutional: No weight loss,  fever  HEENT: No difficulty with swallowing, change in voice, or swelling in throat area   Cardiac: No chest pain, palpitations, or racing heart  Resp: No shortness of breath  GI: No abdominal pain, nausea, vomiting, or diarrhea   Neuro: No numbness or tinging in feet  Endo: No heat or cold intolerance, no polyuria or polydipsia      Past Medical History:  Patient Active Problem List     Diagnosis Date Noted   • Infected breast tissue expander (HCC) 06/15/2018     Priority: High   • Breast cancer (HCC) 06/15/2018     Priority: Medium   • Generalized anxiety disorder with panic attacks 08/07/2017     Priority: Low   • Impaired fasting glucose 02/13/2019   • Morbid obesity (HCC) 02/13/2019   • Weight gain 02/13/2019   • Multiple thyroid nodules 01/24/2019   • Subclinical hypothyroidism 01/24/2019   • Fatigue 01/24/2019   • Vitamin D deficiency 01/24/2019   • BRCA gene mutation positive in female 06/19/2018   • H/O LEEP 06/01/2015   • Family hx of ovarian malignancy 06/01/2015   • Decreased energy 06/01/2015   • Insomnia 06/01/2015   • Anxiety 06/01/2015       Medications:    Current Outpatient Medications:   •  levothyroxine (SYNTHROID) 25 MCG Tab, Take 1 Tab by mouth Every morning on an empty stomach., Disp: 30 Tab, Rfl: 3  •  amphetamine-dextroamphetamine XR (ADDERALL XR) 10 MG CAPSULE SR 24 HR, Take 10 mg by mouth every morning., Disp: , Rfl:   •  Pembrolizumab (KEYTRUDA IV), by Intravenous route., Disp: , Rfl:   •  buPROPion SR (WELLBUTRIN-SR) 150 MG TABLET SR 12 HR sustained-release tablet, Take 1 Tab by mouth every day. (Patient taking differently: Take 300 mg by mouth every day.), Disp: , Rfl:   •  Ascorbic Acid (VITAMIN C) Powder, Take 300 g by mouth., Disp: , Rfl:   •  Melatonin 10 MG Cap, Take 20 mg by mouth., Disp: , Rfl:   •  omeprazole (PRILOSEC) 40 MG delayed-release capsule, TK 1 C PO QD, Disp: , Rfl: 6  •  TURMERIC CURCUMIN PO, Take 1 Tab by mouth every day., Disp: , Rfl:   •  multivitamin (THERAGRAN) Tab, Take 1 Tab by mouth every day., Disp: , Rfl:   •  vitamin D, Ergocalciferol, (DRISDOL) 08435 units Cap capsule, Take 50,000 Units by mouth every 7 days., Disp: , Rfl:   •  methylphenidate (RITALIN) 5 MG Tab, Take 1 Tab by mouth 2 Times a Day., Disp: , Rfl: 0  •  ferrous sulfate 325 (65 Fe) MG tablet, Take 1 Tab by mouth every morning with breakfast. (Patient not taking: Reported on  "6/28/2019), Disp: 30 Tab, Rfl: 12    Labs: Reviewed as above     Physical Examination:  Vital signs: /74 (BP Location: Right arm, Patient Position: Sitting)   Pulse (!) 110   Ht 1.626 m (5' 4\")   Wt 97.1 kg (214 lb)   SpO2 96%   BMI 36.73 kg/m²  Body mass index is 36.73 kg/m².  General: No apparent distress, cooperative,   Eyes: No scleral icterus, no discharge, normal eyelids  Neck: No abnormal masses on inspection, normal thyroid exam  Resp: Normal effort, clear to auscultation bilaterally  CVS: Regular rate and rhythm,   Psych: Alert and oriented, normal mood and affect, intact memory and able to make informed decisions.    Assessment and Plan:  1. Subclinical hypothyroidism  Increase:   Synthroid 25 mcg daily   Discussed taking on daily basis first thing in the morning on empty stomach    2. Multiple thyroid nodules  Due for US 10/19-     3. Impaired fasting glucose  No longer seeing -  HIP     4. Morbid obesity (HCC)  214 stable from prior we will continue to monitor diet and exercise    5. Vitamin D deficiency  Vitamin d to 5000 IU daily   8/13/2019- 43     Return in about 3 months (around 11/15/2019).      Thank you for allowing me to participate in the care of this patient.  If you have any questions or concerns please do not hesitate to contact me.    Chuyita Roach P.A.-C.    CC:   Pcp Pt States None    This note was created using voice recognition software (Dragon). The accuracy of the dictation is limited by the abilities of the software. I have reviewed the note prior to signing, however some errors in grammar and context are still possible. If you have any questions related to this note please do not hesitate to contact our office.   "

## 2019-08-19 ENCOUNTER — HOSPITAL ENCOUNTER (OUTPATIENT)
Facility: MEDICAL CENTER | Age: 39
End: 2019-08-19
Attending: OBSTETRICS & GYNECOLOGY | Admitting: OBSTETRICS & GYNECOLOGY
Payer: COMMERCIAL

## 2019-10-04 ENCOUNTER — HOSPITAL ENCOUNTER (OUTPATIENT)
Dept: RADIOLOGY | Facility: MEDICAL CENTER | Age: 39
End: 2019-10-04
Attending: PHYSICIAN ASSISTANT
Payer: COMMERCIAL

## 2019-10-04 DIAGNOSIS — E03.9 HYPOTHYROIDISM, UNSPECIFIED TYPE: ICD-10-CM

## 2019-10-04 DIAGNOSIS — E04.2 MULTIPLE THYROID NODULES: ICD-10-CM

## 2019-10-04 PROCEDURE — 76536 US EXAM OF HEAD AND NECK: CPT

## 2019-11-04 ENCOUNTER — HOSPITAL ENCOUNTER (OUTPATIENT)
Dept: LAB | Facility: MEDICAL CENTER | Age: 39
End: 2019-11-04
Attending: PHYSICIAN ASSISTANT
Payer: COMMERCIAL

## 2019-11-04 DIAGNOSIS — E03.9 HYPOTHYROIDISM, UNSPECIFIED TYPE: ICD-10-CM

## 2019-11-04 DIAGNOSIS — R53.83 FATIGUE, UNSPECIFIED TYPE: ICD-10-CM

## 2019-11-04 LAB
CORTIS SERPL-MCNC: 21.1 UG/DL (ref 0–23)
T3 SERPL-MCNC: 78 NG/DL (ref 60–181)
T3FREE SERPL-MCNC: 3.1 PG/ML (ref 2.4–4.2)
T4 FREE SERPL-MCNC: 0.84 NG/DL (ref 0.53–1.43)
TSH SERPL DL<=0.005 MIU/L-ACNC: 2.82 UIU/ML (ref 0.38–5.33)

## 2019-11-04 PROCEDURE — 84481 FREE ASSAY (FT-3): CPT

## 2019-11-04 PROCEDURE — 84443 ASSAY THYROID STIM HORMONE: CPT

## 2019-11-04 PROCEDURE — 84480 ASSAY TRIIODOTHYRONINE (T3): CPT

## 2019-11-04 PROCEDURE — 84439 ASSAY OF FREE THYROXINE: CPT

## 2019-11-04 PROCEDURE — 82024 ASSAY OF ACTH: CPT

## 2019-11-04 PROCEDURE — 36415 COLL VENOUS BLD VENIPUNCTURE: CPT

## 2019-11-04 PROCEDURE — 82533 TOTAL CORTISOL: CPT

## 2019-11-05 LAB — ACTH PLAS-MCNC: 44.2 PG/ML (ref 7.2–63.3)

## 2019-11-07 ENCOUNTER — OFFICE VISIT (OUTPATIENT)
Dept: ENDOCRINOLOGY | Facility: MEDICAL CENTER | Age: 39
End: 2019-11-07
Payer: COMMERCIAL

## 2019-11-07 VITALS
HEART RATE: 88 BPM | HEIGHT: 64 IN | DIASTOLIC BLOOD PRESSURE: 60 MMHG | WEIGHT: 201 LBS | SYSTOLIC BLOOD PRESSURE: 116 MMHG | OXYGEN SATURATION: 99 % | BODY MASS INDEX: 34.31 KG/M2

## 2019-11-07 DIAGNOSIS — E66.01 MORBID OBESITY (HCC): ICD-10-CM

## 2019-11-07 DIAGNOSIS — E03.8 SUBCLINICAL HYPOTHYROIDISM: ICD-10-CM

## 2019-11-07 DIAGNOSIS — E04.2 MULTIPLE THYROID NODULES: ICD-10-CM

## 2019-11-07 PROCEDURE — 99214 OFFICE O/P EST MOD 30 MIN: CPT | Performed by: PHYSICIAN ASSISTANT

## 2019-11-07 RX ORDER — LEVOTHYROXINE SODIUM 0.05 MG/1
50 TABLET ORAL
Qty: 30 TAB | Refills: 3 | Status: SHIPPED | OUTPATIENT
Start: 2019-11-07 | End: 2019-12-19

## 2019-11-07 RX ORDER — ESTRADIOL 10 UG/1
10 INSERT VAGINAL DAILY
COMMUNITY
End: 2021-06-18

## 2019-11-07 RX ORDER — ARNICA FLOWER
3 TINCTURE MISCELLANEOUS DAILY
COMMUNITY
End: 2020-09-29

## 2019-11-07 NOTE — PROGRESS NOTES
Endocrinology Clinic Progress Note  PCP: Pcp Pt States None    HPI:  Pebbles Cosme is a 38 y.o. old patient who comes in today for review of endocrine problems.    1. Subclinical hypothyroidism  Patient has noticed improvement in symptoms.     Levothyroxine 25 mcg daily   Takes on empty stomach   Compliant with medications     Denies any herbal supplements      Ref. Range 11/4/2019 08:22   TSH Latest Ref Range: 0.380 - 5.330 uIU/mL 2.820   Free T-4 Latest Ref Range: 0.53 - 1.43 ng/dL 0.84   T3 Latest Ref Range: 60.0 - 181.0 ng/dL 78.0   T3,Free Latest Ref Range: 2.40 - 4.20 pg/mL 3.10     July 30- TSH 3.28 ([facility] trial labs)  6/17/2019- TSH 4.160 (for trial labs)      2. Multiple thyroid nodules  Patient denies symptoms of sensation of a throat mass, voice changes, hoarseness, neck pain, or difficulty swallowing, dysphonia, cough, enlarged cervical lymph nodes, sweating, tremors, heat intolerance.      No new family history of thyroid cancer     Last ultrasound thyroid US 4/29/2019    10/4/2019 4:41 PM  COMPARISON:  Thyroid ultrasound 4/29/2019     FINDINGS:  The thyroid gland is heterogeneous.  Multiple small hypoechoic areas are present throughout the thyroid gland.  The 2 larger thyroid nodules are described below.  Vascularity is normal.     The right lobe of the thyroid gland measures 2.22 cm x 5.66 cm x 1.64 cm.  The left lobe of the thyroid gland measures 4.9 x 1.6 x 2.04 cm.  The isthmus measures 0.32 cm.     Nodules >= 1cm: 1-left lobe        Nodule #1  Location:  Left  mid  Size:  1.2 x 0.79 x 0.67 cm. Previous measurements were 1.1 x 0.74 x 0.62 cm.  Composition:  Spongiform-0  Echogenicity:  Hypoechoic-2  Shape:  Wider than tall-0  Margins:  Smooth-0  Echogenic Foci:  Microscopic-3     ACR TIRADS points/category:  5 - TR4 - Moderately Suspicious     Nodule #2  Location:  Right  mid  Size:  4.9 x 3.9 x 3.4 mm. Previous measurements were 7.8 x 7.8 x 6.7 mm.  Composition:   Cystic-0  Echogenicity:  Hypoechoic-2  Shape:  Wider than tall-0  Margins:  Smooth-0  Echogenic Foci:  Microscopic-3     ACR TIRADS points/category:  5 - TR4 - Moderately Suspicious         3. Impaired fasting glucose  Patient is feeling well.   She has been loosing weight after reconstruction surgery.     4. Morbid obesity (HCC)  10/4/2019- 201  8/2019 weight 214 stable from prior  6/19- 215    HIP 4/29/2019.   Using a 1600 kCal /diet     According the patient, since being on Wellbutrin and Adderall they are reluctant to use any weight loss medications.     5. Vitamin D deficiency  Previously Vitamin D 50 k weekly   Patient did not have Vitamin D labs preformed.     Endocrine history to date:    1. Multiple thyroid nodules  Patient was recently treated with neck radiation for her breast.      Thyroid US 4/29/2019  Nodule #1  Location:  Right  lower  Size:  0.78 x 0.67 x 0.78 cm  Composition:  Cystic-0  Echogenicity:  Hypoechoic-2  Shape:  Wider than tall-0  Margins:  Smooth-0  Echogenic Foci:  Macroscopic-1    Nodule #2   Location:  Left  mid  Size:  1.12 x 0.62 x 0.74 cm  Composition:  Cystic-0  Echogenicity:  Hypoechoic-2  Shape:  Wider than tall-0  Margins:  Smooth-0  Echogenic Foci:  Macroscopic-1    Nodule #3  Location:  Left  mid  Size:  1.13 x 0.59 x 0.73 cm  Composition:  Solid-2  Echogenicity:  Isoechoic-1  Shape:  Wider than tall-0  Margins:  Smooth-0  Echogenic Foci:  None-0     Thyroid ultrasound 11/5/2018 (compared to November 2017)  Right thyroid lobe contains a well-circumscribed hypoechoic cystic nodule which measures 7 mm x 6 mm x 5 mm.    Left lobe contains multiple hypoechoic nodules the largest measuring 1.0 cm x 0.6 cm and 0.5 cm.    The left thyroid lobe also contains a posterior hypoechoic cystic nodule which measures 1.2 x 0.8 x 0.8 lateral dimension which appears stable.  When compared to prior examination of the left lobe of the thyroid gland exhibits multiple new hypoechoic nodules the  largest measuring 1 cm in greatest dimension.  She does have bilateral cystic nodules     2. Subclinical hypothyroidism  6/29/2019 T4 12.5 mcg daily     6/17/2019- TSH 4.160 (for trial labs)  2/7/19- TSH 2.160, FT4 1.02, FT3 3.65   12/13/2018 TSH 2.360 free T4 0.91 free T3 3.43  10/2018 TSH 2.890  11/2017 TSH 3.9 free T4 0.74     3. Class 3 severe obesity without serious comorbidity in adult, unspecified BMI, unspecified obesity type (HCC)  8/2019 weight 214  6/19- Weight 215    2/7/19- ACTH 11, A1c 5.0  11/1/2018 glucose 100     4. Vitamin D borderline  Started on Vitamin D 50k weekly 5/18 2/7/2019 vitamin D 30  2/26/2018 vitamin D 28  6/19/2018 vitamin B12 694    ROS:  Constitutional: No weight loss,  fever  HEENT: No difficulty with swallowing, change in voice, or swelling in throat area   Cardiac: No chest pain, palpitations, or racing heart  Resp: No shortness of breath  GI: No abdominal pain, nausea, vomiting, or diarrhea   Neuro: No numbness or tinging in feet  Endo: No heat or cold intolerance, no polyuria or polydipsia      Past Medical History:  Patient Active Problem List    Diagnosis Date Noted   • Infected breast tissue expander (HCC) 06/15/2018     Priority: High   • Breast cancer (HCC) 06/15/2018     Priority: Medium   • Generalized anxiety disorder with panic attacks 08/07/2017     Priority: Low   • Impaired fasting glucose 02/13/2019   • Morbid obesity (HCC) 02/13/2019   • Weight gain 02/13/2019   • Multiple thyroid nodules 01/24/2019   • Subclinical hypothyroidism 01/24/2019   • Fatigue 01/24/2019   • Vitamin D deficiency 01/24/2019   • BRCA gene mutation positive in female 06/19/2018   • H/O LEEP 06/01/2015   • Family hx of ovarian malignancy 06/01/2015   • Decreased energy 06/01/2015   • Insomnia 06/01/2015   • Anxiety 06/01/2015       Medications:    Current Outpatient Medications:   •  Multiple Vitamins-Minerals (MULTIVITAMIN ADULT PO), Take 1 Tab by mouth every day., Disp: , Rfl:   •   "Cholecalciferol 4000 units Cap, Take 1 Cap by mouth every day., Disp: , Rfl:   •  Arnica Flower Tincture, Take 3 Doses by mouth every day., Disp: , Rfl:   •  estradiol (VAGIFEM) 10 MCG Tab, Insert 10 mcg in vagina every day., Disp: , Rfl:   •  levothyroxine (SYNTHROID) 50 MCG Tab, Take 1 Tab by mouth Every morning on an empty stomach., Disp: 30 Tab, Rfl: 3  •  amphetamine-dextroamphetamine XR (ADDERALL XR) 10 MG CAPSULE SR 24 HR, Take 10 mg by mouth every morning., Disp: , Rfl:   •  Pembrolizumab (KEYTRUDA IV), by Intravenous route., Disp: , Rfl:   •  buPROPion SR (WELLBUTRIN-SR) 150 MG TABLET SR 12 HR sustained-release tablet, Take 1 Tab by mouth every day. (Patient taking differently: Take 300 mg by mouth every day.), Disp: , Rfl:   •  Ascorbic Acid (VITAMIN C) Powder, Take 300 g by mouth., Disp: , Rfl:   •  Melatonin 10 MG Cap, Take 20 mg by mouth., Disp: , Rfl:   •  omeprazole (PRILOSEC) 40 MG delayed-release capsule, TK 1 C PO QD, Disp: , Rfl: 6  •  methylphenidate (RITALIN) 5 MG Tab, Take 1 Tab by mouth 2 Times a Day., Disp: , Rfl: 0  •  ferrous sulfate 325 (65 Fe) MG tablet, Take 1 Tab by mouth every morning with breakfast., Disp: 30 Tab, Rfl: 12  •  TURMERIC CURCUMIN PO, Take 1 Tab by mouth every day., Disp: , Rfl:   •  multivitamin (THERAGRAN) Tab, Take 1 Tab by mouth every day., Disp: , Rfl:   •  vitamin D, Ergocalciferol, (DRISDOL) 10910 units Cap capsule, Take 50,000 Units by mouth every 7 days., Disp: , Rfl:     Labs: Reviewed as above     Physical Examination:  Vital signs: /60 (BP Location: Right arm, Patient Position: Sitting, BP Cuff Size: Adult)   Pulse 88   Ht 1.626 m (5' 4\")   Wt 91.2 kg (201 lb)   SpO2 99%   BMI 34.50 kg/m²  Body mass index is 34.5 kg/m².  General: No apparent distress, cooperative,   Eyes: No scleral icterus, no discharge, normal eyelids  Neck: No abnormal masses on inspection, normal thyroid exam  Resp: Normal effort, clear to auscultation bilaterally  CVS: Regular " rate and rhythm,   Psych: Alert and oriented, normal mood and affect, intact memory and able to make informed decisions.    Assessment and Plan:  1. Subclinical hypothyroidism  Increase:   Synthroid 50 mcg daily   Discussed taking on daily basis first thing in the morning on empty stomach  Discussed brand vs. Generic     2. Multiple thyroid nodules  1.  Stable appearance of spongiform thyroid nodule in the midpole region of the left lobe measuring 1.2 x 0.79 x 0.67 cm.     2.  Stable appearance of cystic hypoechoic nodule with microcalcifications in the lower pole of the right lobe of the thyroid gland measuring 3.9 x 4.9 x 3.4 mm.  Interval decrease in size. Previous measurements were 7.8 x 7.8 x 6.7 mm.    Reviewed with patient. Discussed potential biopsy in the future if interval changes continue or increase in symptoms or calcifications. Discussed 1-3% chance of malignancy.     3. Impaired fasting glucose  No longer seeing -  HIP     4. Morbid obesity (HCC)  Down 15 lbs     5. Vitamin D deficiency  Vitamin d to 5000 IU daily - continue   8/13/2019- 43     Return in about 6 weeks (around 12/19/2019).      Thank you for allowing me to participate in the care of this patient.  If you have any questions or concerns please do not hesitate to contact me.    Chuyita Roach P.A.-C.    CC:   Pcp Pt States None    This note was created using voice recognition software (Dragon). The accuracy of the dictation is limited by the abilities of the software. I have reviewed the note prior to signing, however some errors in grammar and context are still possible. If you have any questions related to this note please do not hesitate to contact our office.

## 2019-12-03 ENCOUNTER — HOSPITAL ENCOUNTER (OUTPATIENT)
Dept: LAB | Facility: MEDICAL CENTER | Age: 39
End: 2019-12-03
Attending: INTERNAL MEDICINE
Payer: COMMERCIAL

## 2019-12-03 LAB
ALBUMIN SERPL BCP-MCNC: 4.2 G/DL (ref 3.2–4.9)
ALBUMIN/GLOB SERPL: 1.8 G/DL
ALP SERPL-CCNC: 70 U/L (ref 30–99)
ALT SERPL-CCNC: 14 U/L (ref 2–50)
ANION GAP SERPL CALC-SCNC: 7 MMOL/L (ref 0–11.9)
AST SERPL-CCNC: 18 U/L (ref 12–45)
BASOPHILS # BLD AUTO: 0.7 % (ref 0–1.8)
BASOPHILS # BLD: 0.06 K/UL (ref 0–0.12)
BILIRUB SERPL-MCNC: 0.3 MG/DL (ref 0.1–1.5)
BUN SERPL-MCNC: 15 MG/DL (ref 8–22)
CALCIUM SERPL-MCNC: 9.1 MG/DL (ref 8.5–10.5)
CHLORIDE SERPL-SCNC: 105 MMOL/L (ref 96–112)
CO2 SERPL-SCNC: 25 MMOL/L (ref 20–33)
CREAT SERPL-MCNC: 0.72 MG/DL (ref 0.5–1.4)
EOSINOPHIL # BLD AUTO: 1.02 K/UL (ref 0–0.51)
EOSINOPHIL NFR BLD: 12.7 % (ref 0–6.9)
ERYTHROCYTE [DISTWIDTH] IN BLOOD BY AUTOMATED COUNT: 46.5 FL (ref 35.9–50)
GLOBULIN SER CALC-MCNC: 2.4 G/DL (ref 1.9–3.5)
GLUCOSE SERPL-MCNC: 107 MG/DL (ref 65–99)
HCT VFR BLD AUTO: 37 % (ref 37–47)
HGB BLD-MCNC: 11.5 G/DL (ref 12–16)
IMM GRANULOCYTES # BLD AUTO: 0.03 K/UL (ref 0–0.11)
IMM GRANULOCYTES NFR BLD AUTO: 0.4 % (ref 0–0.9)
LYMPHOCYTES # BLD AUTO: 1.98 K/UL (ref 1–4.8)
LYMPHOCYTES NFR BLD: 24.7 % (ref 22–41)
MCH RBC QN AUTO: 29.8 PG (ref 27–33)
MCHC RBC AUTO-ENTMCNC: 31.1 G/DL (ref 33.6–35)
MCV RBC AUTO: 95.9 FL (ref 81.4–97.8)
MONOCYTES # BLD AUTO: 0.5 K/UL (ref 0–0.85)
MONOCYTES NFR BLD AUTO: 6.2 % (ref 0–13.4)
NEUTROPHILS # BLD AUTO: 4.43 K/UL (ref 2–7.15)
NEUTROPHILS NFR BLD: 55.3 % (ref 44–72)
NRBC # BLD AUTO: 0 K/UL
NRBC BLD-RTO: 0 /100 WBC
PLATELET # BLD AUTO: 296 K/UL (ref 164–446)
PMV BLD AUTO: 9.6 FL (ref 9–12.9)
POTASSIUM SERPL-SCNC: 4.4 MMOL/L (ref 3.6–5.5)
PROT SERPL-MCNC: 6.6 G/DL (ref 6–8.2)
RBC # BLD AUTO: 3.86 M/UL (ref 4.2–5.4)
SODIUM SERPL-SCNC: 137 MMOL/L (ref 135–145)
T3FREE SERPL-MCNC: 3.48 PG/ML (ref 2.4–4.2)
T4 FREE SERPL-MCNC: 1.03 NG/DL (ref 0.53–1.43)
TSH SERPL DL<=0.005 MIU/L-ACNC: 2.45 UIU/ML (ref 0.38–5.33)
WBC # BLD AUTO: 8 K/UL (ref 4.8–10.8)

## 2019-12-03 PROCEDURE — 80053 COMPREHEN METABOLIC PANEL: CPT

## 2019-12-03 PROCEDURE — 84439 ASSAY OF FREE THYROXINE: CPT

## 2019-12-03 PROCEDURE — 85025 COMPLETE CBC W/AUTO DIFF WBC: CPT

## 2019-12-03 PROCEDURE — 84443 ASSAY THYROID STIM HORMONE: CPT

## 2019-12-03 PROCEDURE — 84481 FREE ASSAY (FT-3): CPT

## 2019-12-09 ENCOUNTER — APPOINTMENT (OUTPATIENT)
Dept: ADMISSIONS | Facility: MEDICAL CENTER | Age: 39
End: 2019-12-09
Payer: COMMERCIAL

## 2019-12-19 ENCOUNTER — APPOINTMENT (OUTPATIENT)
Dept: ENDOCRINOLOGY | Facility: MEDICAL CENTER | Age: 39
End: 2019-12-19
Payer: COMMERCIAL

## 2019-12-19 ENCOUNTER — OFFICE VISIT (OUTPATIENT)
Dept: ENDOCRINOLOGY | Facility: MEDICAL CENTER | Age: 39
End: 2019-12-19
Payer: COMMERCIAL

## 2019-12-19 VITALS
HEART RATE: 104 BPM | DIASTOLIC BLOOD PRESSURE: 80 MMHG | BODY MASS INDEX: 33.46 KG/M2 | OXYGEN SATURATION: 99 % | HEIGHT: 64 IN | SYSTOLIC BLOOD PRESSURE: 112 MMHG | WEIGHT: 196 LBS

## 2019-12-19 DIAGNOSIS — E03.8 SUBCLINICAL HYPOTHYROIDISM: ICD-10-CM

## 2019-12-19 DIAGNOSIS — E55.9 VITAMIN D DEFICIENCY: ICD-10-CM

## 2019-12-19 DIAGNOSIS — R53.83 FATIGUE, UNSPECIFIED TYPE: ICD-10-CM

## 2019-12-19 PROCEDURE — 99214 OFFICE O/P EST MOD 30 MIN: CPT | Performed by: PHYSICIAN ASSISTANT

## 2019-12-19 RX ORDER — LEVOTHYROXINE SODIUM 0.07 MG/1
75 TABLET ORAL
Qty: 30 TAB | Refills: 6 | Status: SHIPPED | OUTPATIENT
Start: 2019-12-19 | End: 2020-03-13 | Stop reason: SDUPTHER

## 2019-12-19 NOTE — PROGRESS NOTES
Endocrinology Clinic Progress Note  PCP: Pcp Pt States None    HPI:  Pebbles Cosme is a 38 y.o. old patient who comes in today for review of endocrine problems.    1. Subclinical hypothyroidism  Patient has noticed improvement in symptoms. Patient has been working out and watching weight. Patient has noticed polyuria and polydipsia.     Levothyroxine 50 mcg daily   Takes on empty stomach   Compliant with medications     Denies any herbal supplements      Ref. Range 12/3/2019 11:20   TSH Latest Ref Range: 0.380 - 5.330 uIU/mL 2.450   Free T-4 Latest Ref Range: 0.53 - 1.43 ng/dL 1.03   T3,Free Latest Ref Range: 2.40 - 4.20 pg/mL 3.48       12/2/2019- TSH 4.360 ([place])      Ref. Range 11/4/2019 08:22   TSH Latest Ref Range: 0.380 - 5.330 uIU/mL 2.820   Free T-4 Latest Ref Range: 0.53 - 1.43 ng/dL 0.84   T3 Latest Ref Range: 60.0 - 181.0 ng/dL 78.0   T3,Free Latest Ref Range: 2.40 - 4.20 pg/mL 3.10     July 30- TSH 3.28 ([facility] trial labs)  6/17/2019- TSH 4.160 (for trial labs)      2. Multiple thyroid nodules  Patient denies symptoms of sensation of a throat mass, voice changes, hoarseness, neck pain, or difficulty swallowing, dysphonia, cough, enlarged cervical lymph nodes, sweating, tremors, heat intolerance.      No new family history of thyroid cancer     Last ultrasound thyroid US 4/29/2019    10/4/2019 4:41 PM  COMPARISON:  Thyroid ultrasound 4/29/2019     FINDINGS:  The thyroid gland is heterogeneous.  Multiple small hypoechoic areas are present throughout the thyroid gland.  The 2 larger thyroid nodules are described below.  Vascularity is normal.     The right lobe of the thyroid gland measures 2.22 cm x 5.66 cm x 1.64 cm.  The left lobe of the thyroid gland measures 4.9 x 1.6 x 2.04 cm.  The isthmus measures 0.32 cm.     Nodules >= 1cm: 1-left lobe        Nodule #1  Location:  Left  mid  Size:  1.2 x 0.79 x 0.67 cm. Previous measurements were 1.1 x 0.74 x 0.62 cm.  Composition:   Spongiform-0  Echogenicity:  Hypoechoic-2  Shape:  Wider than tall-0  Margins:  Smooth-0  Echogenic Foci:  Microscopic-3     ACR TIRADS points/category:  5 - TR4 - Moderately Suspicious     Nodule #2  Location:  Right  mid  Size:  4.9 x 3.9 x 3.4 mm. Previous measurements were 7.8 x 7.8 x 6.7 mm.  Composition:  Cystic-0  Echogenicity:  Hypoechoic-2  Shape:  Wider than tall-0  Margins:  Smooth-0  Echogenic Foci:  Microscopic-3     ACR TIRADS points/category:  5 - TR4 - Moderately Suspicious         3. Impaired fasting glucose  Patient is feeling well.   She has been loosing weight after reconstruction surgery.     4. Morbid obesity (HCC)  12/19- 196 lbs - working out   10/4/2019- 201  8/2019 weight 214 stable from prior  6/19- 215    HIP 4/29/2019.   Using a 1600 kCal /diet     According the patient, since being on Wellbutrin and Adderall they are reluctant to use any weight loss medications.     5. Vitamin D deficiency  Previously Vitamin D 50 k weekly   Patient did not have Vitamin D labs preformed.     Endocrine history to date:    1. Multiple thyroid nodules  Patient was recently treated with neck radiation for her breast.      Thyroid US 4/29/2019  Nodule #1  Location:  Right  lower  Size:  0.78 x 0.67 x 0.78 cm  Composition:  Cystic-0  Echogenicity:  Hypoechoic-2  Shape:  Wider than tall-0  Margins:  Smooth-0  Echogenic Foci:  Macroscopic-1    Nodule #2   Location:  Left  mid  Size:  1.12 x 0.62 x 0.74 cm  Composition:  Cystic-0  Echogenicity:  Hypoechoic-2  Shape:  Wider than tall-0  Margins:  Smooth-0  Echogenic Foci:  Macroscopic-1    Nodule #3  Location:  Left  mid  Size:  1.13 x 0.59 x 0.73 cm  Composition:  Solid-2  Echogenicity:  Isoechoic-1  Shape:  Wider than tall-0  Margins:  Smooth-0  Echogenic Foci:  None-0     Thyroid ultrasound 11/5/2018 (compared to November 2017)  Right thyroid lobe contains a well-circumscribed hypoechoic cystic nodule which measures 7 mm x 6 mm x 5 mm.    Left lobe contains  multiple hypoechoic nodules the largest measuring 1.0 cm x 0.6 cm and 0.5 cm.    The left thyroid lobe also contains a posterior hypoechoic cystic nodule which measures 1.2 x 0.8 x 0.8 lateral dimension which appears stable.  When compared to prior examination of the left lobe of the thyroid gland exhibits multiple new hypoechoic nodules the largest measuring 1 cm in greatest dimension.  She does have bilateral cystic nodules     2. Subclinical hypothyroidism  6/29/2019 T4 12.5 mcg daily     6/17/2019- TSH 4.160 (for trial labs)  2/7/19- TSH 2.160, FT4 1.02, FT3 3.65   12/13/2018 TSH 2.360 free T4 0.91 free T3 3.43  10/2018 TSH 2.890  11/2017 TSH 3.9 free T4 0.74     3. Class 3 severe obesity without serious comorbidity in adult, unspecified BMI, unspecified obesity type ([facility])  8/2019 weight 214  6/19- Weight 215    2/7/19- ACTH 11, A1c 5.0  11/1/2018 glucose 100     4. Vitamin D borderline  Started on Vitamin D 50k weekly 5/18 2/7/2019 vitamin D 30  2/26/2018 vitamin D 28  6/19/2018 vitamin B12 694    ROS:  Constitutional: No weight loss,  fever  HEENT: No difficulty with swallowing, change in voice, or swelling in throat area   Cardiac: No chest pain, palpitations, or racing heart  Resp: No shortness of breath  GI: No abdominal pain, nausea, vomiting, or diarrhea   Neuro: No numbness or tinging in feet  Endo: No heat or cold intolerance, no polyuria or polydipsia      Past Medical History:  Patient Active Problem List    Diagnosis Date Noted   • Infected breast tissue expander (HCC) 06/15/2018     Priority: High   • Breast cancer ([facility]) 06/15/2018     Priority: Medium   • Generalized anxiety disorder with panic attacks 08/07/2017     Priority: Low   • Impaired fasting glucose 02/13/2019   • Morbid obesity ([facility]) 02/13/2019   • Weight gain 02/13/2019   • Multiple thyroid nodules 01/24/2019   • Subclinical hypothyroidism 01/24/2019   • Fatigue 01/24/2019   • Vitamin D deficiency 01/24/2019   • BRCA gene mutation  positive in female 06/19/2018   • H/O LEEP 06/01/2015   • Family hx of ovarian malignancy 06/01/2015   • Decreased energy 06/01/2015   • Insomnia 06/01/2015   • Anxiety 06/01/2015       Medications:    Current Outpatient Medications:   •  levothyroxine (SYNTHROID) 75 MCG Tab, Take 1 Tab by mouth Every morning on an empty stomach., Disp: 30 Tab, Rfl: 6  •  Multiple Vitamins-Minerals (MULTIVITAMIN ADULT PO), Take 1 Tab by mouth every day., Disp: , Rfl:   •  Cholecalciferol 4000 units Cap, Take 1 Cap by mouth every day., Disp: , Rfl:   •  Arnica Flower Tincture, Take 3 Doses by mouth every day., Disp: , Rfl:   •  estradiol (VAGIFEM) 10 MCG Tab, Insert 10 mcg in vagina every day., Disp: , Rfl:   •  amphetamine-dextroamphetamine XR (ADDERALL XR) 10 MG CAPSULE SR 24 HR, Take 10 mg by mouth every morning., Disp: , Rfl:   •  Pembrolizumab (KEYTRUDA IV), by Intravenous route., Disp: , Rfl:   •  buPROPion SR (WELLBUTRIN-SR) 150 MG TABLET SR 12 HR sustained-release tablet, Take 1 Tab by mouth every day. (Patient taking differently: Take 300 mg by mouth every day.), Disp: , Rfl:   •  Ascorbic Acid (VITAMIN C) Powder, Take 300 g by mouth., Disp: , Rfl:   •  Melatonin 10 MG Cap, Take 20 mg by mouth., Disp: , Rfl:   •  omeprazole (PRILOSEC) 40 MG delayed-release capsule, TK 1 C PO QD, Disp: , Rfl: 6  •  methylphenidate (RITALIN) 5 MG Tab, Take 1 Tab by mouth 2 Times a Day., Disp: , Rfl: 0  •  ferrous sulfate 325 (65 Fe) MG tablet, Take 1 Tab by mouth every morning with breakfast., Disp: 30 Tab, Rfl: 12  •  TURMERIC CURCUMIN PO, Take 1 Tab by mouth every day., Disp: , Rfl:   •  multivitamin (THERAGRAN) Tab, Take 1 Tab by mouth every day., Disp: , Rfl:   •  vitamin D, Ergocalciferol, (DRISDOL) 99683 units Cap capsule, Take 50,000 Units by mouth every 7 days., Disp: , Rfl:     Labs: Reviewed as above     Physical Examination:  Vital signs: /80 (BP Location: Right arm, Patient Position: Sitting, BP Cuff Size: Adult)   Pulse (!)  "104   Ht 1.626 m (5' 4\")   Wt 88.9 kg (196 lb)   SpO2 99%   BMI 33.64 kg/m²  Body mass index is 33.64 kg/m².  General: No apparent distress, cooperative,   Eyes: No scleral icterus, no discharge, normal eyelids  Neck: No abnormal masses on inspection, normal thyroid exam  Resp: Normal effort, clear to auscultation bilaterally  CVS: Regular rate and rhythm,   Psych: Alert and oriented, normal mood and affect, intact memory and able to make informed decisions.    Assessment and Plan:  1. Subclinical hypothyroidism  Increase:   Synthroid 75  mcg daily     2. Multiple thyroid nodules  Reviewed thyroid ultrasound:   1.  Stable appearance of spongiform thyroid nodule in the midpole region of the left lobe measuring 1.2 x 0.79 x 0.67 cm.     2.  Stable appearance of cystic hypoechoic nodule with microcalcifications in the lower pole of the right lobe of the thyroid gland measuring 3.9 x 4.9 x 3.4 mm.  Interval decrease in size. Previous measurements were 7.8 x 7.8 x 6.7 mm.    Reviewed with patient. Discussed potential biopsy in the future if interval changes continue or increase in symptoms or calcifications. Discussed 1-3% chance of malignancy.     3. Impaired fasting glucose  No longer seeing -  HIP     4. Morbid obesity (HCC)  Continues weight loss. She is doing a great job.     5. Vitamin D deficiency  Vitamin d to 5000 IU daily - continue   8/13/2019- 43     Return in about 3 months (around 3/19/2020).      Thank you for allowing me to participate in the care of this patient.  If you have any questions or concerns please do not hesitate to contact me.    Chuyita Roach P.A.-C.    CC:   Pcp Pt States None    This note was created using voice recognition software (Dragon). The accuracy of the dictation is limited by the abilities of the software. I have reviewed the note prior to signing, however some errors in grammar and context are still possible. If you have any questions related to this note please do not " hesitate to contact our office.

## 2019-12-23 ENCOUNTER — HOSPITAL ENCOUNTER (OUTPATIENT)
Dept: LAB | Facility: MEDICAL CENTER | Age: 39
End: 2019-12-23
Attending: NURSE PRACTITIONER
Payer: COMMERCIAL

## 2019-12-23 LAB
AMYLASE SERPL-CCNC: 39 U/L (ref 20–103)
LIPASE SERPL-CCNC: 18 U/L (ref 11–82)

## 2019-12-23 PROCEDURE — 83036 HEMOGLOBIN GLYCOSYLATED A1C: CPT

## 2019-12-23 PROCEDURE — 82150 ASSAY OF AMYLASE: CPT

## 2019-12-23 PROCEDURE — 83690 ASSAY OF LIPASE: CPT

## 2019-12-24 LAB
EST. AVERAGE GLUCOSE BLD GHB EST-MCNC: 105 MG/DL
HBA1C MFR BLD: 5.3 % (ref 0–5.6)

## 2019-12-30 ENCOUNTER — HOSPITAL ENCOUNTER (OUTPATIENT)
Dept: LAB | Facility: MEDICAL CENTER | Age: 39
End: 2019-12-30
Attending: NURSE PRACTITIONER
Payer: COMMERCIAL

## 2019-12-30 LAB — CORTIS SERPL-MCNC: 16.5 UG/DL (ref 0–23)

## 2019-12-30 PROCEDURE — 36415 COLL VENOUS BLD VENIPUNCTURE: CPT

## 2019-12-30 PROCEDURE — 82533 TOTAL CORTISOL: CPT

## 2019-12-30 PROCEDURE — 82024 ASSAY OF ACTH: CPT

## 2019-12-31 LAB — ACTH PLAS-MCNC: 27.5 PG/ML (ref 7.2–63.3)

## 2020-01-08 ENCOUNTER — OFFICE VISIT (OUTPATIENT)
Dept: URGENT CARE | Facility: PHYSICIAN GROUP | Age: 40
End: 2020-01-08
Payer: COMMERCIAL

## 2020-01-08 ENCOUNTER — HOSPITAL ENCOUNTER (OUTPATIENT)
Dept: RADIOLOGY | Facility: MEDICAL CENTER | Age: 40
End: 2020-01-08
Attending: PHYSICIAN ASSISTANT
Payer: COMMERCIAL

## 2020-01-08 VITALS
HEART RATE: 93 BPM | TEMPERATURE: 98.2 F | RESPIRATION RATE: 15 BRPM | SYSTOLIC BLOOD PRESSURE: 148 MMHG | BODY MASS INDEX: 32.44 KG/M2 | OXYGEN SATURATION: 97 % | HEIGHT: 64 IN | DIASTOLIC BLOOD PRESSURE: 100 MMHG | WEIGHT: 190 LBS

## 2020-01-08 DIAGNOSIS — S50.11XA CONTUSION OF RIGHT FOREARM, INITIAL ENCOUNTER: ICD-10-CM

## 2020-01-08 DIAGNOSIS — M62.838 CERVICAL PARASPINAL MUSCLE SPASM: ICD-10-CM

## 2020-01-08 DIAGNOSIS — S20.211A CHEST WALL CONTUSION, RIGHT, INITIAL ENCOUNTER: ICD-10-CM
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
## 2020-01-08 DIAGNOSIS — S20.211A CHEST WALL CONTUSION, RIGHT, INITIAL ENCOUNTER: Primary | ICD-10-CM

## 2020-01-08 PROCEDURE — 71046 X-RAY EXAM CHEST 2 VIEWS: CPT

## 2020-01-08 PROCEDURE — 99214 OFFICE O/P EST MOD 30 MIN: CPT | Performed by: PHYSICIAN ASSISTANT

## 2020-01-08 RX ORDER — CYCLOBENZAPRINE HCL 10 MG
10 TABLET ORAL 3 TIMES DAILY PRN
Qty: 30 TAB | Refills: 0 | Status: SHIPPED | OUTPATIENT
Start: 2020-01-08 | End: 2020-09-29

## 2020-01-08 ASSESSMENT — PAIN SCALES - GENERAL: PAINLEVEL: 7=MODERATE-SEVERE PAIN

## 2020-01-13 ENCOUNTER — OUTPATIENT INFUSION SERVICES (OUTPATIENT)
Dept: ONCOLOGY | Facility: MEDICAL CENTER | Age: 40
End: 2020-01-13
Attending: INTERNAL MEDICINE
Payer: COMMERCIAL

## 2020-01-13 VITALS
WEIGHT: 196.21 LBS | HEIGHT: 63 IN | RESPIRATION RATE: 18 BRPM | SYSTOLIC BLOOD PRESSURE: 143 MMHG | DIASTOLIC BLOOD PRESSURE: 89 MMHG | HEART RATE: 99 BPM | BODY MASS INDEX: 34.77 KG/M2 | OXYGEN SATURATION: 98 % | TEMPERATURE: 99.3 F

## 2020-01-13 DIAGNOSIS — C50.912 MALIGNANT NEOPLASM OF LEFT FEMALE BREAST, UNSPECIFIED ESTROGEN RECEPTOR STATUS, UNSPECIFIED SITE OF BREAST (HCC): ICD-10-CM

## 2020-01-13 LAB
ALBUMIN SERPL BCP-MCNC: 4.2 G/DL (ref 3.2–4.9)
ALBUMIN/GLOB SERPL: 1.3 G/DL
ALP SERPL-CCNC: 65 U/L (ref 30–99)
ALT SERPL-CCNC: 16 U/L (ref 2–50)
ANION GAP SERPL CALC-SCNC: 9 MMOL/L (ref 0–11.9)
AST SERPL-CCNC: 20 U/L (ref 12–45)
BASOPHILS # BLD AUTO: 0.6 % (ref 0–1.8)
BASOPHILS # BLD: 0.04 K/UL (ref 0–0.12)
BILIRUB SERPL-MCNC: 0.2 MG/DL (ref 0.1–1.5)
BUN SERPL-MCNC: 16 MG/DL (ref 8–22)
CALCIUM SERPL-MCNC: 9.2 MG/DL (ref 8.5–10.5)
CHLORIDE SERPL-SCNC: 102 MMOL/L (ref 96–112)
CO2 SERPL-SCNC: 24 MMOL/L (ref 20–33)
CREAT SERPL-MCNC: 0.8 MG/DL (ref 0.5–1.4)
EOSINOPHIL # BLD AUTO: 0.52 K/UL (ref 0–0.51)
EOSINOPHIL NFR BLD: 7.4 % (ref 0–6.9)
ERYTHROCYTE [DISTWIDTH] IN BLOOD BY AUTOMATED COUNT: 45.1 FL (ref 35.9–50)
GLOBULIN SER CALC-MCNC: 3.2 G/DL (ref 1.9–3.5)
GLUCOSE SERPL-MCNC: 98 MG/DL (ref 65–99)
HCG SERPL QL: NEGATIVE
HCT VFR BLD AUTO: 38.6 % (ref 37–47)
HGB BLD-MCNC: 12.2 G/DL (ref 12–16)
IMM GRANULOCYTES # BLD AUTO: 0.02 K/UL (ref 0–0.11)
IMM GRANULOCYTES NFR BLD AUTO: 0.3 % (ref 0–0.9)
LYMPHOCYTES # BLD AUTO: 2.21 K/UL (ref 1–4.8)
LYMPHOCYTES NFR BLD: 31.3 % (ref 22–41)
MCH RBC QN AUTO: 28.8 PG (ref 27–33)
MCHC RBC AUTO-ENTMCNC: 31.6 G/DL (ref 33.6–35)
MCV RBC AUTO: 91 FL (ref 81.4–97.8)
MONOCYTES # BLD AUTO: 0.28 K/UL (ref 0–0.85)
MONOCYTES NFR BLD AUTO: 4 % (ref 0–13.4)
NEUTROPHILS # BLD AUTO: 3.98 K/UL (ref 2–7.15)
NEUTROPHILS NFR BLD: 56.4 % (ref 44–72)
NRBC # BLD AUTO: 0 K/UL
NRBC BLD-RTO: 0 /100 WBC
PLATELET # BLD AUTO: 294 K/UL (ref 164–446)
PMV BLD AUTO: 9.1 FL (ref 9–12.9)
POTASSIUM SERPL-SCNC: 4.4 MMOL/L (ref 3.6–5.5)
PROT SERPL-MCNC: 7.4 G/DL (ref 6–8.2)
RBC # BLD AUTO: 4.24 M/UL (ref 4.2–5.4)
SODIUM SERPL-SCNC: 135 MMOL/L (ref 135–145)
TSH SERPL DL<=0.005 MIU/L-ACNC: 1.27 UIU/ML (ref 0.38–5.33)
WBC # BLD AUTO: 7.1 K/UL (ref 4.8–10.8)

## 2020-01-13 PROCEDURE — 84443 ASSAY THYROID STIM HORMONE: CPT

## 2020-01-13 PROCEDURE — 85025 COMPLETE CBC W/AUTO DIFF WBC: CPT

## 2020-01-13 PROCEDURE — 84703 CHORIONIC GONADOTROPIN ASSAY: CPT

## 2020-01-13 PROCEDURE — 36415 COLL VENOUS BLD VENIPUNCTURE: CPT

## 2020-01-13 PROCEDURE — 80053 COMPREHEN METABOLIC PANEL: CPT

## 2020-01-13 NOTE — PROGRESS NOTES
Pt arrives to [facility] for pre-chemo lab draw.  Pt was seen by Nata from Research dept. at chair side.  Labs drawn as ordered via 25g butterfly to R-AC.  Site wrapped with pressure dressing.  Gave pt a copy of schedule.  Pt dc home to self care.

## 2020-01-14 ENCOUNTER — OUTPATIENT INFUSION SERVICES (OUTPATIENT)
Dept: ONCOLOGY | Facility: MEDICAL CENTER | Age: 40
End: 2020-01-14
Attending: INTERNAL MEDICINE
Payer: COMMERCIAL

## 2020-01-14 VITALS
DIASTOLIC BLOOD PRESSURE: 84 MMHG | RESPIRATION RATE: 18 BRPM | OXYGEN SATURATION: 95 % | HEIGHT: 63 IN | WEIGHT: 194 LBS | TEMPERATURE: 98.3 F | BODY MASS INDEX: 34.38 KG/M2 | HEART RATE: 90 BPM | SYSTOLIC BLOOD PRESSURE: 127 MMHG

## 2020-01-14 PROCEDURE — 700111 HCHG RX REV CODE 636 W/ 250 OVERRIDE (IP): Performed by: NURSE PRACTITIONER

## 2020-01-14 PROCEDURE — 96413 CHEMO IV INFUSION 1 HR: CPT

## 2020-01-14 PROCEDURE — A4212 NON CORING NEEDLE OR STYLET: HCPCS

## 2020-01-14 PROCEDURE — 700111 HCHG RX REV CODE 636 W/ 250 OVERRIDE (IP): Performed by: INTERNAL MEDICINE

## 2020-01-14 PROCEDURE — 700111 HCHG RX REV CODE 636 W/ 250 OVERRIDE (IP)

## 2020-01-14 PROCEDURE — 36593 DECLOT VASCULAR DEVICE: CPT

## 2020-01-14 RX ADMIN — ALTEPLASE 2 MG: 2.2 INJECTION, POWDER, LYOPHILIZED, FOR SOLUTION INTRAVENOUS at 14:43

## 2020-01-14 RX ADMIN — HEPARIN 500 UNITS: 100 SYRINGE at 15:42

## 2020-01-14 RX ADMIN — Medication 200 MG: at 15:03

## 2020-01-14 ASSESSMENT — ENCOUNTER SYMPTOMS
SINUS PAIN: 0
ABDOMINAL PAIN: 0
NECK PAIN: 1
NUMBNESS: 0
VISUAL CHANGE: 0
CHANGE IN BOWEL HABIT: 0
ANOREXIA: 0
BACK PAIN: 0
MYALGIAS: 1
ARTHRALGIAS: 1
DIZZINESS: 0
FEVER: 0
HEADACHES: 0

## 2020-01-14 ASSESSMENT — VISUAL ACUITY: OU: 1

## 2020-01-14 NOTE — PROGRESS NOTES
Chemotherapy Verification - PRIMARY RN      Height = 161 cm  Weight = 88 kg  BSA = 1.98 m2       Medication: Keytruda  Dose: 200 mg set dose  Calculated Dose: 200 mg set dose                             (In mg/m2, AUC, mg/kg)           I confirm this process was performed independently with the BSA and all final chemotherapy dosing calculations congruent.  Any discrepancies of 10% or greater have been addressed with the chemotherapy pharmacist. The resolution of the discrepancy has been documented in the EPIC progress notes.

## 2020-01-14 NOTE — PROGRESS NOTES
"Pharmacy Chemotherapy Verification    Patient Name: Pebbles Cosme  Diagnosis: Triple-negative Breast Cancer    [facility] [place] Protocol     Subject #: [id number]    ARM 2  MK-3475 (pembrolizumab) 200 mg IV on Days 1 and 22  Every 42 days for 52 weeks  Blayne CASEY et al. A Randomized, Phase III Trial to Evaluate the Efficacy and Safety of MK-3475 as Adjuvant Therapy for Triple Receptor-Negative Breast Cancer with > 1 cm Residual Invasive Cancer or Positive Lymph Nodes (ypN+) After Neoadjuvant Chemotherapy. NCT #[id number]    Allergies: Patient has no known allergies.  /84   Pulse 90   Temp 36.8 °C (98.3 °F) (Temporal)   Resp 18   Ht 1.61 m (5' 3.39\")   Wt 88 kg (194 lb 0.1 oz)   SpO2 95%   BMI 33.95 kg/m²  Body surface area is 1.98 meters squared.  Body mass index is 33.95 kg/m².     Per study protocol:  • labs are required only at the beginning of each 42-day cycle (none required on Day 22 visits)  • TSH is required only at baseline/screening, and Day 1 of Cycles 3, 5, and 7.    Labs 1/13/20  ANC~ 3980 Plt = 294k   Hgb = 12.2     SCr = 0.8 mg/dL CrCl ~ >125 mL/min   LFT's = WNLs  TBili = 0.2   TSH = 1.27 Free T4 = pending     Drug Order   (Drug name, dose, route, IV Fluid & volume, frequency, number of doses) Cycle: 8  Previous treatment: s/p 7 cycles from outside facility   Medication = MK-3475 (Pembrolizumab)  Base Dose= 200 mg   Fixed dose, no calculation required  Final Dose = 200 mg  Route = IV  Fluid & Volume = NS 50 mL  Admin Duration = Over 30 min  Investigational supplied medication    <10% difference, okay to treat with final dose     David Peña, PharmD    "

## 2020-01-14 NOTE — PROGRESS NOTES
Chemotherapy Verification - SECONDARY RN       Height = 161cm  Weight = 88kg  BSA = 1.98m2       Medication: Keytruda  Dose: 200mg set dose  Calculated Dose: 200 mg                             (In mg/m2, AUC, mg/kg)         I confirm that this process was performed independently.

## 2020-01-14 NOTE — PROGRESS NOTES
"Pharmacy Chemotherapy Verification    Patient Name: Pebbles Cosme  Diagnosis: Triple-negative Breast Cancer    [facility] [place] Protocol     Subject #: [id number]    Cycle: 8 Day 1        Previous treatment: 7 cycles at other facility         ARM 2  MK-3475 (pembrolizumab) 200 mg IV on Days 1 and 22  Every 42 days for 52 weeks  Blayne CASEY et al. A Randomized, Phase III Trial to Evaluate the Efficacy and Safety of MK-3475 as Adjuvant Therapy for Triple Receptor-Negative Breast Cancer with > 1 cm Residual Invasive Cancer or Positive Lymph Nodes (ypN+) After Neoadjuvant Chemotherapy. NCT #[id number]    Allergies: Patient has no known allergies.  /84   Pulse 90   Temp 36.8 °C (98.3 °F) (Temporal)   Resp 18   Ht 1.61 m (5' 3.39\")   Wt 88 kg (194 lb 0.1 oz)   SpO2 95%   BMI 33.95 kg/m²  Body surface area is 1.98 meters squared.  Body mass index is 33.95 kg/m².     Per study protocol:  • labs are required only at the beginning of each 42-day cycle (none required on Day 22 visits)  • TSH is required only at baseline/screening, and Day 1 of Cycles 3, 5, and 7.    **Labs from [1/13/20] reviewed (including serum creatinine, bilirubin, AST/ALT, and TSH), all within parameters specified in research protocol**    MK-3475 (Pembrolizumab) 200 mg fixed dose   <10% difference, okay to treat with final dose = 200 mg IV    Kael AlexanderD.      "

## 2020-01-15 NOTE — PROGRESS NOTES
Patient presents for Keytruda infusion. Patient participating in a clinical trial. Patient has been receiving Keytruda at [facility] office. Patient will receive her last three infusions at [facility]. Reviewed plan of care, patient verbalizes understanding. Port accessed using sterile technique, flushes well with no blood return. Order for Cathflo obtained. Cathflo instilled. PIV established, flushes well with brisk blood return. Keytruda infused as ordered. Brisk blood return from patients's port approximately 45 minutes post Cathflo. PIV removed, tip intact, compression dressing to site. Port flushed per protocol and de-accessed. Patient scheduled for her next appointment and released in no acute distress.

## 2020-02-03 ENCOUNTER — OUTPATIENT INFUSION SERVICES (OUTPATIENT)
Dept: ONCOLOGY | Facility: MEDICAL CENTER | Age: 40
End: 2020-02-03
Attending: INTERNAL MEDICINE
Payer: COMMERCIAL

## 2020-02-03 VITALS
DIASTOLIC BLOOD PRESSURE: 89 MMHG | SYSTOLIC BLOOD PRESSURE: 142 MMHG | HEART RATE: 92 BPM | OXYGEN SATURATION: 97 % | RESPIRATION RATE: 18 BRPM | TEMPERATURE: 98.9 F

## 2020-02-03 DIAGNOSIS — C50.912 MALIGNANT NEOPLASM OF LEFT FEMALE BREAST, UNSPECIFIED ESTROGEN RECEPTOR STATUS, UNSPECIFIED SITE OF BREAST (HCC): ICD-10-CM

## 2020-02-03 PROBLEM — Z00.6 RESEARCH STUDY PATIENT: Status: ACTIVE | Noted: 2020-01-13

## 2020-02-03 LAB
ALBUMIN SERPL BCP-MCNC: 4.4 G/DL (ref 3.2–4.9)
ALBUMIN/GLOB SERPL: 1.2 G/DL
ALP SERPL-CCNC: 71 U/L (ref 30–99)
ALT SERPL-CCNC: 14 U/L (ref 2–50)
ANION GAP SERPL CALC-SCNC: 10 MMOL/L (ref 0–11.9)
AST SERPL-CCNC: 19 U/L (ref 12–45)
BASOPHILS # BLD AUTO: 0.6 % (ref 0–1.8)
BASOPHILS # BLD: 0.04 K/UL (ref 0–0.12)
BILIRUB SERPL-MCNC: 0.3 MG/DL (ref 0.1–1.5)
BUN SERPL-MCNC: 18 MG/DL (ref 8–22)
CALCIUM SERPL-MCNC: 9.5 MG/DL (ref 8.5–10.5)
CHLORIDE SERPL-SCNC: 101 MMOL/L (ref 96–112)
CO2 SERPL-SCNC: 25 MMOL/L (ref 20–33)
CREAT SERPL-MCNC: 0.92 MG/DL (ref 0.5–1.4)
EOSINOPHIL # BLD AUTO: 0.25 K/UL (ref 0–0.51)
EOSINOPHIL NFR BLD: 3.4 % (ref 0–6.9)
ERYTHROCYTE [DISTWIDTH] IN BLOOD BY AUTOMATED COUNT: 44.9 FL (ref 35.9–50)
GLOBULIN SER CALC-MCNC: 3.8 G/DL (ref 1.9–3.5)
GLUCOSE SERPL-MCNC: 95 MG/DL (ref 65–99)
HCG SERPL QL: NEGATIVE
HCT VFR BLD AUTO: 39.3 % (ref 37–47)
HGB BLD-MCNC: 12.8 G/DL (ref 12–16)
IMM GRANULOCYTES # BLD AUTO: 0.02 K/UL (ref 0–0.11)
IMM GRANULOCYTES NFR BLD AUTO: 0.3 % (ref 0–0.9)
LYMPHOCYTES # BLD AUTO: 2.1 K/UL (ref 1–4.8)
LYMPHOCYTES NFR BLD: 28.9 % (ref 22–41)
MCH RBC QN AUTO: 28.8 PG (ref 27–33)
MCHC RBC AUTO-ENTMCNC: 32.6 G/DL (ref 33.6–35)
MCV RBC AUTO: 88.5 FL (ref 81.4–97.8)
MONOCYTES # BLD AUTO: 0.38 K/UL (ref 0–0.85)
MONOCYTES NFR BLD AUTO: 5.2 % (ref 0–13.4)
NEUTROPHILS # BLD AUTO: 4.47 K/UL (ref 2–7.15)
NEUTROPHILS NFR BLD: 61.6 % (ref 44–72)
NRBC # BLD AUTO: 0 K/UL
NRBC BLD-RTO: 0 /100 WBC
PLATELET # BLD AUTO: 284 K/UL (ref 164–446)
PMV BLD AUTO: 9 FL (ref 9–12.9)
POTASSIUM SERPL-SCNC: 3.8 MMOL/L (ref 3.6–5.5)
PROT SERPL-MCNC: 8.2 G/DL (ref 6–8.2)
RBC # BLD AUTO: 4.44 M/UL (ref 4.2–5.4)
SODIUM SERPL-SCNC: 136 MMOL/L (ref 135–145)
TSH SERPL DL<=0.005 MIU/L-ACNC: 1.83 UIU/ML (ref 0.38–5.33)
WBC # BLD AUTO: 7.3 K/UL (ref 4.8–10.8)

## 2020-02-03 PROCEDURE — 84703 CHORIONIC GONADOTROPIN ASSAY: CPT

## 2020-02-03 PROCEDURE — 80053 COMPREHEN METABOLIC PANEL: CPT

## 2020-02-03 PROCEDURE — 85025 COMPLETE CBC W/AUTO DIFF WBC: CPT

## 2020-02-03 PROCEDURE — 36415 COLL VENOUS BLD VENIPUNCTURE: CPT

## 2020-02-03 PROCEDURE — 84443 ASSAY THYROID STIM HORMONE: CPT

## 2020-02-03 NOTE — PROGRESS NOTES
Here for pre-chemo labs today. Denies any complaints. Labs collected from [facility] using 23ga butterfly needle. Site covered with pressure dressing. Labs sent. Discharged home to self care in no distress at this time. Next appointment in place.

## 2020-02-04 ENCOUNTER — OUTPATIENT INFUSION SERVICES (OUTPATIENT)
Dept: ONCOLOGY | Facility: MEDICAL CENTER | Age: 40
End: 2020-02-04
Attending: INTERNAL MEDICINE
Payer: COMMERCIAL

## 2020-02-04 VITALS
TEMPERATURE: 98 F | HEIGHT: 64 IN | WEIGHT: 194 LBS | RESPIRATION RATE: 18 BRPM | DIASTOLIC BLOOD PRESSURE: 77 MMHG | SYSTOLIC BLOOD PRESSURE: 140 MMHG | OXYGEN SATURATION: 99 % | HEART RATE: 83 BPM | BODY MASS INDEX: 33.12 KG/M2

## 2020-02-04 DIAGNOSIS — C50.912 MALIGNANT NEOPLASM OF LEFT FEMALE BREAST, UNSPECIFIED ESTROGEN RECEPTOR STATUS, UNSPECIFIED SITE OF BREAST (HCC): ICD-10-CM

## 2020-02-04 PROCEDURE — 700111 HCHG RX REV CODE 636 W/ 250 OVERRIDE (IP): Performed by: INTERNAL MEDICINE

## 2020-02-04 PROCEDURE — 96413 CHEMO IV INFUSION 1 HR: CPT

## 2020-02-04 PROCEDURE — A4212 NON CORING NEEDLE OR STYLET: HCPCS

## 2020-02-04 PROCEDURE — 700111 HCHG RX REV CODE 636 W/ 250 OVERRIDE (IP)

## 2020-02-04 PROCEDURE — 36593 DECLOT VASCULAR DEVICE: CPT

## 2020-02-04 RX ADMIN — ALTEPLASE 2 MG: 2.2 INJECTION, POWDER, LYOPHILIZED, FOR SOLUTION INTRAVENOUS at 12:02

## 2020-02-04 RX ADMIN — HEPARIN 500 UNITS: 100 SYRINGE at 13:21

## 2020-02-04 RX ADMIN — Medication 200 MG: at 12:23

## 2020-02-04 NOTE — PROGRESS NOTES
"Pharmacy Chemotherapy Verification    Diagnosis: Triple-negative Breast Cancer  [facility] [place] Protocol     Subject #: [id number]  ARM 2  MK-3475 (pembrolizumab) 200 mg IV on Days 1 and 22  Every 42 days for 52 weeks  Blayne CASEY et al. A Randomized, Phase III Trial to Evaluate the Efficacy and Safety of MK-3475 as Adjuvant Therapy for Triple Receptor-Negative Breast Cancer with > 1 cm Residual Invasive Cancer or Positive Lymph Nodes (ypN+) After Neoadjuvant Chemotherapy. NCT #[id number]    Allergies: Patient has no known allergies.  /77   Pulse 83   Temp 36.7 °C (98 °F) (Temporal)   Resp 18   Ht 1.615 m (5' 3.58\") Comment: took 1 inch off of shoes  Wt 88 kg (194 lb 0.1 oz)   SpO2 99%   BMI 33.74 kg/m²  Body surface area is 1.99 meters squared.  Body mass index is 33.74 kg/m².     Per study protocol:  • labs are required only at the beginning of each 42-day cycle (none required on Day 22 visits)  • TSH is required only at baseline/screening, and Day 1 of Cycles 3, 5, and 7.    Labs 2/3/20  ANC~ 4470 Plt = 284k   Hgb = 12.8     SCr = 0.92 mg/dL CrCl ~ 112 mL/min   LFT's = WNL TBili = 0.3   TSH = 1.83     Drug Order   (Drug name, dose, route, IV Fluid & volume, frequency, number of doses) Cycle 8 Day 22  Previous treatment: C8D1 1/14/20   Medication = MK-3475 (Pembrolizumab)  Base Dose= 200 mg   Fixed dose, no calculation required  Final Dose = 200 mg  Route = IV  Fluid & Volume = NS 50 mL  Admin Duration = Over 30 min  Investigational supplied medication    <10% difference, okay to treat with final dose     Norma Henderson, PharmD    "

## 2020-02-04 NOTE — PROGRESS NOTES
Patient presents for Keytruda infusion. Port accessed using sterile technique, flushes well with no blood return. Cathflo instilled per MD orders. PIV started, per patient request, flushes well with brisk blood return. Keytruda infused as ordered, filter in place. After the Cathflo had been in the port for one hour and twenty minutes the port still had no blood return. The patient refuses to stay the two hours or for a repeat dose. Cathflo aspirated from line and line flushed per protocol and de-accessed. Patient scheduled for her next appointment and released in no acute distress.

## 2020-02-04 NOTE — PROGRESS NOTES
"Pharmacy Chemotherapy Verification    Patient Name: Pebbles Cosem  Diagnosis: Triple-negative Breast Cancer    [facility] [place]     Subject #: [id number]  PAPER ORDERS IN CHART    Cycle: 8 Day 22        Previous treatment: 7 cycles at other facility         ARM 2  MK-3475 (pembrolizumab) 200 mg IV on Days 1 and 22  Every 42 days for 52 weeks  Blayne CASEY et al. A Randomized, Phase III Trial to Evaluate the Efficacy and Safety of MK-3475 as Adjuvant Therapy for Triple Receptor-Negative Breast Cancer with > 1 cm Residual Invasive Cancer or Positive Lymph Nodes (ypN+) After Neoadjuvant Chemotherapy. NCT #[id number]    Allergies: Patient has no known allergies.  /77   Pulse 83   Temp 36.7 °C (98 °F) (Temporal)   Resp 18   Ht 1.615 m (5' 3.58\") Comment: took 1 inch off of shoes  Wt 88 kg (194 lb 0.1 oz)   SpO2 99%   BMI 33.74 kg/m²  Body surface area is 1.99 meters squared.  Body mass index is 33.74 kg/m².     Per study protocol:  • labs are required only at the beginning of each 42-day cycle (none required on Day 22 visits)  • TSH is required only at baseline/screening, and Day 1 of Cycles 3, 5, and 7.    **Labs from 2/3/20 reviewed (including serum creatinine, bilirubin, AST/ALT, and TSH), all within parameters specified in research protocol**    MK-3475 (Pembrolizumab) 200 mg fixed dose   <10% difference, okay to treat with final dose = 200 mg IV    ZEESHAN Turner PharmGordonD.      "

## 2020-02-04 NOTE — PROGRESS NOTES
Chemotherapy Verification - SECONDARY RN       Height = 161.5 cm  Weight = 88 kg  BSA = 1.99 m2       Medication: Keytruda  Dose: 200 mg (set dose)  Calculated Dose: 200 mg                             (In mg/m2, AUC, mg/kg)       I confirm that this process was performed independently.

## 2020-02-04 NOTE — PROGRESS NOTES
Chemotherapy Verification - SECONDARY RN       Height = 161.5 cm  Weight = 88 kg  BSA = 1.99 m2       Medication: Keytruda  Dose: 200 mg set dose  Calculated Dose: 200 mg set dose                             (In mg/m2, AUC, mg/kg)         I confirm that this process was performed independently.

## 2020-02-11 ENCOUNTER — PATIENT OUTREACH (OUTPATIENT)
Dept: OTHER | Facility: MEDICAL CENTER | Age: 40
End: 2020-02-11

## 2020-02-11 NOTE — PROGRESS NOTES
Oncology nurse navigator reached out to patient to introduce myself and my services.  Patient states that she is currently employed at [employer] and is currently on disability and is set to return back to work in April.  She reports that she is doing good currently and at this time has no foreseen barriers to her current treatment plan.  Oncology nurse navigator spoke about resources here at [facility] along with the nurture you class and beauties on the run.  Patient requested that I e-mail the information to her in which I did.  Patient has my contact information.

## 2020-02-24 ENCOUNTER — OUTPATIENT INFUSION SERVICES (OUTPATIENT)
Dept: ONCOLOGY | Facility: MEDICAL CENTER | Age: 40
End: 2020-02-24
Attending: INTERNAL MEDICINE
Payer: COMMERCIAL

## 2020-02-24 VITALS
BODY MASS INDEX: 33.46 KG/M2 | WEIGHT: 195.99 LBS | TEMPERATURE: 99.5 F | HEART RATE: 87 BPM | OXYGEN SATURATION: 100 % | DIASTOLIC BLOOD PRESSURE: 72 MMHG | SYSTOLIC BLOOD PRESSURE: 130 MMHG | RESPIRATION RATE: 18 BRPM | HEIGHT: 64 IN

## 2020-02-24 DIAGNOSIS — C50.912 MALIGNANT NEOPLASM OF LEFT FEMALE BREAST, UNSPECIFIED ESTROGEN RECEPTOR STATUS, UNSPECIFIED SITE OF BREAST (HCC): ICD-10-CM

## 2020-02-24 DIAGNOSIS — Z00.6 RESEARCH STUDY PATIENT: ICD-10-CM

## 2020-02-24 LAB
ALBUMIN SERPL BCP-MCNC: 4 G/DL (ref 3.2–4.9)
ALBUMIN/GLOB SERPL: 1.2 G/DL
ALP SERPL-CCNC: 78 U/L (ref 30–99)
ALT SERPL-CCNC: 11 U/L (ref 2–50)
ANION GAP SERPL CALC-SCNC: 10 MMOL/L (ref 0–11.9)
AST SERPL-CCNC: 15 U/L (ref 12–45)
BASOPHILS # BLD AUTO: 0.6 % (ref 0–1.8)
BASOPHILS # BLD: 0.07 K/UL (ref 0–0.12)
BILIRUB SERPL-MCNC: 0.2 MG/DL (ref 0.1–1.5)
BUN SERPL-MCNC: 16 MG/DL (ref 8–22)
CALCIUM SERPL-MCNC: 8.8 MG/DL (ref 8.5–10.5)
CHLORIDE SERPL-SCNC: 101 MMOL/L (ref 96–112)
CO2 SERPL-SCNC: 23 MMOL/L (ref 20–33)
CREAT SERPL-MCNC: 0.85 MG/DL (ref 0.5–1.4)
EOSINOPHIL # BLD AUTO: 0.81 K/UL (ref 0–0.51)
EOSINOPHIL NFR BLD: 6.9 % (ref 0–6.9)
ERYTHROCYTE [DISTWIDTH] IN BLOOD BY AUTOMATED COUNT: 46.9 FL (ref 35.9–50)
GLOBULIN SER CALC-MCNC: 3.4 G/DL (ref 1.9–3.5)
GLUCOSE SERPL-MCNC: 110 MG/DL (ref 65–99)
HCG SERPL QL: NEGATIVE
HCT VFR BLD AUTO: 34.9 % (ref 37–47)
HGB BLD-MCNC: 11.4 G/DL (ref 12–16)
IMM GRANULOCYTES # BLD AUTO: 0.23 K/UL (ref 0–0.11)
IMM GRANULOCYTES NFR BLD AUTO: 2 % (ref 0–0.9)
LYMPHOCYTES # BLD AUTO: 3 K/UL (ref 1–4.8)
LYMPHOCYTES NFR BLD: 25.7 % (ref 22–41)
MCH RBC QN AUTO: 28.8 PG (ref 27–33)
MCHC RBC AUTO-ENTMCNC: 32.7 G/DL (ref 33.6–35)
MCV RBC AUTO: 88.1 FL (ref 81.4–97.8)
MONOCYTES # BLD AUTO: 0.53 K/UL (ref 0–0.85)
MONOCYTES NFR BLD AUTO: 4.5 % (ref 0–13.4)
NEUTROPHILS # BLD AUTO: 7.03 K/UL (ref 2–7.15)
NEUTROPHILS NFR BLD: 60.3 % (ref 44–72)
NRBC # BLD AUTO: 0 K/UL
NRBC BLD-RTO: 0 /100 WBC
PLATELET # BLD AUTO: 425 K/UL (ref 164–446)
PMV BLD AUTO: 8.7 FL (ref 9–12.9)
POTASSIUM SERPL-SCNC: 3.7 MMOL/L (ref 3.6–5.5)
PROT SERPL-MCNC: 7.4 G/DL (ref 6–8.2)
RBC # BLD AUTO: 3.96 M/UL (ref 4.2–5.4)
SODIUM SERPL-SCNC: 134 MMOL/L (ref 135–145)
TSH SERPL DL<=0.005 MIU/L-ACNC: 2.98 UIU/ML (ref 0.38–5.33)
WBC # BLD AUTO: 11.7 K/UL (ref 4.8–10.8)

## 2020-02-24 PROCEDURE — 84443 ASSAY THYROID STIM HORMONE: CPT

## 2020-02-24 PROCEDURE — 84703 CHORIONIC GONADOTROPIN ASSAY: CPT

## 2020-02-24 PROCEDURE — 80053 COMPREHEN METABOLIC PANEL: CPT

## 2020-02-24 PROCEDURE — 36415 COLL VENOUS BLD VENIPUNCTURE: CPT

## 2020-02-24 PROCEDURE — 85025 COMPLETE CBC W/AUTO DIFF WBC: CPT

## 2020-02-24 RX ORDER — 0.9 % SODIUM CHLORIDE 0.9 %
20 VIAL (ML) INJECTION ONCE
Status: CANCELLED | OUTPATIENT
Start: 2020-02-24

## 2020-02-24 RX ORDER — LEVOTHYROXINE SODIUM 0.03 MG/1
TABLET ORAL
COMMUNITY
Start: 2020-02-12 | End: 2020-02-24

## 2020-02-24 RX ORDER — DEXTROAMPHETAMINE SACCHARATE, AMPHETAMINE ASPARTATE, DEXTROAMPHETAMINE SULFATE AND AMPHETAMINE SULFATE 2.5; 2.5; 2.5; 2.5 MG/1; MG/1; MG/1; MG/1
10 TABLET ORAL 2 TIMES DAILY
COMMUNITY
Start: 2020-01-27

## 2020-02-24 RX ORDER — BUPROPION HYDROCHLORIDE 300 MG/1
300 TABLET ORAL EVERY MORNING
COMMUNITY
Start: 2020-02-12

## 2020-02-24 RX ORDER — ALPRAZOLAM 0.25 MG/1
0.25 TABLET ORAL 3 TIMES DAILY PRN
COMMUNITY
Start: 2020-01-27

## 2020-02-24 RX ORDER — ESTRADIOL 1 MG/1
TABLET ORAL DAILY
COMMUNITY
Start: 2020-02-06 | End: 2023-10-10

## 2020-02-24 NOTE — PROGRESS NOTES
Patient to [facility] for lab drawn. Labs drawn peripherally. Patient has appt 2-25-20 for chemo. Gauze and Coban applied for dressing. Patient to home in care of self.

## 2020-02-25 ENCOUNTER — DOCUMENTATION (OUTPATIENT)
Dept: NUTRITION | Facility: MEDICAL CENTER | Age: 40
End: 2020-02-25

## 2020-02-25 ENCOUNTER — OUTPATIENT INFUSION SERVICES (OUTPATIENT)
Dept: ONCOLOGY | Facility: MEDICAL CENTER | Age: 40
End: 2020-02-25
Attending: INTERNAL MEDICINE
Payer: COMMERCIAL

## 2020-02-25 VITALS
HEART RATE: 91 BPM | HEIGHT: 63 IN | DIASTOLIC BLOOD PRESSURE: 73 MMHG | TEMPERATURE: 98.9 F | SYSTOLIC BLOOD PRESSURE: 114 MMHG | WEIGHT: 192.02 LBS | BODY MASS INDEX: 34.02 KG/M2 | OXYGEN SATURATION: 95 % | RESPIRATION RATE: 18 BRPM

## 2020-02-25 DIAGNOSIS — C50.912 MALIGNANT NEOPLASM OF LEFT FEMALE BREAST, UNSPECIFIED ESTROGEN RECEPTOR STATUS, UNSPECIFIED SITE OF BREAST (HCC): ICD-10-CM

## 2020-02-25 PROCEDURE — 700111 HCHG RX REV CODE 636 W/ 250 OVERRIDE (IP): Performed by: INTERNAL MEDICINE

## 2020-02-25 PROCEDURE — 96413 CHEMO IV INFUSION 1 HR: CPT

## 2020-02-25 PROCEDURE — 96365 THER/PROPH/DIAG IV INF INIT: CPT

## 2020-02-25 RX ADMIN — Medication 200 MG: at 14:39

## 2020-02-25 NOTE — PROGRESS NOTES
"Pharmacy Chemotherapy Verification    Patient Name: Pebbles Cosme  Diagnosis: Triple-negative Breast Cancer    [facility] [place] Protocol     Subject #: [id number]  PAPER ORDERS IN CHART    Cycle: 9 Day 1        Previous treatment C8 D 22- 2/4/20         ARM 2  MK-3475 (pembrolizumab) 200 mg IV on Days 1 and 22  Every 42 days for 52 weeks  Blayne CASEY et al. A Randomized, Phase III Trial to Evaluate the Efficacy and Safety of MK-3475 as Adjuvant Therapy for Triple Receptor-Negative Breast Cancer with > 1 cm Residual Invasive Cancer or Positive Lymph Nodes (ypN+) After Neoadjuvant Chemotherapy. NCT #[id number]    Allergies: Patient has no known allergies.  /73   Pulse 91   Temp 37.2 °C (98.9 °F) (Temporal)   Resp 18   Ht 1.61 m (5' 3.39\")   Wt 87.1 kg (192 lb 0.3 oz)   SpO2 95%   BMI 33.60 kg/m²  Body surface area is 1.97 meters squared.  Body mass index is 33.6 kg/m².     Per study protocol:  • labs are required only at the beginning of each 42-day cycle (none required on Day 22 visits)  • TSH is required only at baseline/screening, and Day 1 of Cycles 3, 5, and 7.    **Labs from 2/24/20 reviewed (including serum creatinine, bilirubin, AST/ALT, and TSH), all within parameters specified in research protocol**    MK-3475 (Pembrolizumab) 200 mg fixed dose   <10% difference, okay to treat with final dose = 200 mg IV    ZEESHAN Turner Pharm.D.      "

## 2020-02-25 NOTE — PROGRESS NOTES
"Nutrition Services: RD Consultation/ New Start Chemotherapy  39 year old female with diagnosis of Breast cancer.       Past Medical History:   Diagnosis Date   • Anxiety    • Cancer (HCC) 11/2017    breast cancer   • Pain     edwar drains in place bilateral chest   • Psychiatric problem     anxiety       RD met with pt to assess current intake, appetite, and nutritional status.  Pt presents to appointment with friend.  Pt appears nourished.  Pt states this is 3rd treatment with [facility] and also the last one. Expresses relief. Mentions has been tolerating fine, though had some issues with frequent urination. Denies hx of diabetes. States did have some nausea, would take medication for it and it would be helpful if she took in time. Mentions would have severe vomiting if didn't \"take on time\". Denies d/c. Denies changes in weight. Pt did not express any further nutrition-related questions or concerns at this time.     Assessment:  • Pertinent Labs: Sodium 134, Glucose 110 A1c 5.3%  • Pertinent Meds: MK-3475  • Weight: 192 lbs  • Height: 5'3\"  • BMI: 33.6  • Obesity Class I    Weight History from Chart:  194 lbs on 2/4/20  240 lbs on 2/20/19    Weight Change/Malnutrition Risk: Per chart review, pt experienced potential 48 lb weight loss within the past 1 year, which is a significant 20% weight loss. Pt does not meet criteria for malnutrition at this time.     Interventions:  • Introduced self and role of dietitian throughout treatment process  • Provided Eating Well After Cancer Treatment class information for current and 2nd cycle. Discussed class overview and recipe sampling.  • Provided RD contact info, encouraged to reach out if has questions/concerns, especially if nausea does not improve.     Monitoring and Evalutation  • Attend weekly nutrition class per pt preference  • Pt report improvements in nausea   • Follow-up PRN    Pt reports understanding and was receptive to information provided.   RD available PRN. "   [phone]

## 2020-02-25 NOTE — PROGRESS NOTES
Chemotherapy Verification - PRIMARY RN      Height = 1.61m Weight = 87.1kg  BSA = 1.97m2       Medication: pembrolizumab  Dose: flat dose  Calculated Dose: 200mg                              (In mg/m2, AUC, mg/kg)     I confirm this process was performed independently with the BSA and all final chemotherapy dosing calculations congruent.  Any discrepancies of 10% or greater have been addressed with the chemotherapy pharmacist. The resolution of the discrepancy has been documented in the EPIC progress notes.

## 2020-02-25 NOTE — PROGRESS NOTES
"Pharmacy Chemotherapy Verification    Patient Name: Pebbles Cosme  Diagnosis: Triple-negative Breast Cancer    [facility] [place] [facility] Protocol     Subject #: [id number]  ARM 2  MK-3475 (pembrolizumab) 200 mg IV on Days 1 and 22  Every 42 days for 52 weeks  Blayne CASEY et al. A Randomized, Phase III Trial to Evaluate the Efficacy and Safety of MK-3475 as Adjuvant Therapy for Triple Receptor-Negative Breast Cancer with > 1 cm Residual Invasive Cancer or Positive Lymph Nodes (ypN+) After Neoadjuvant Chemotherapy. NCT #[id number]    Allergies: Patient has no known allergies.    /73   Pulse 91   Temp 37.2 °C (98.9 °F) (Temporal)   Resp 18   Ht 1.61 m (5' 3.39\")   Wt 87.1 kg (192 lb 0.3 oz)   SpO2 95%   BMI 33.60 kg/m²  Body surface area is 1.97 meters squared.    Per study protocol:  • labs are required only at the beginning of each 42-day cycle (none required on Day 22 visits)  • TSH is required only at baseline/screening, and Day 1 of Cycles 3, 5, and 7.     Labs 02/24/20:  ANC~ 7000 Plt = 425k Hgb = 11.4   SCr = 0.85 mg/dL CrCl ~ 119 mL/min   LFT's = WNL TBili = 0.2  TSH = 2.980     Drug Order   (Drug name, dose, route, IV Fluid & volume, frequency, number of doses) Cycle 9, Day 1  Previous treatment: C8D22 on 02/04/20   Medication = MK-3475 (Pembrolizumab)  Base Dose= 200 mg   Fixed dose, no calculation required  Final Dose = 200 mg  Route = IV  Fluid & Volume = NS 50 mL  Admin Duration = Over 30 min  Investigational supplied medication    No calc required, okay to treat with final dose       Lis Virk, PharmD, BCOP    "

## 2020-02-25 NOTE — PROGRESS NOTES
Patient to [facility] for chemotherapy infusion. PIV inserted into right forearm. Flushed with + blood return. Pembrolizumab infused with no s/s of infusion reaction. PIV flushed with + blood return. PIV removed .Patient does not need future appt. Patient to home in care of self.

## 2020-02-25 NOTE — PROGRESS NOTES
Chemotherapy Verification - SECONDARY RN       Height = 161 cm  Weight = 87.1 kg  BSA = 1.97 m2       Medication: Keytruda  Dose: 200 mg (set dose)  Calculated Dose: 200 mg                             (In mg/m2, AUC, mg/kg)       I confirm that this process was performed independently.

## 2020-03-13 DIAGNOSIS — E03.8 SUBCLINICAL HYPOTHYROIDISM: ICD-10-CM

## 2020-03-13 RX ORDER — LEVOTHYROXINE SODIUM 0.07 MG/1
75 TABLET ORAL
Qty: 30 TAB | Refills: 6 | Status: SHIPPED | OUTPATIENT
Start: 2020-03-13 | End: 2020-03-17

## 2020-03-17 ENCOUNTER — TELEPHONE (OUTPATIENT)
Dept: HEALTH INFORMATION MANAGEMENT | Facility: OTHER | Age: 40
End: 2020-03-17

## 2020-03-17 ENCOUNTER — OFFICE VISIT (OUTPATIENT)
Dept: ENDOCRINOLOGY | Facility: MEDICAL CENTER | Age: 40
End: 2020-03-17
Payer: COMMERCIAL

## 2020-03-17 VITALS
OXYGEN SATURATION: 96 % | WEIGHT: 187.9 LBS | SYSTOLIC BLOOD PRESSURE: 122 MMHG | HEART RATE: 95 BPM | HEIGHT: 64 IN | DIASTOLIC BLOOD PRESSURE: 76 MMHG | BODY MASS INDEX: 32.08 KG/M2

## 2020-03-17 DIAGNOSIS — E04.2 NONTOXIC MULTINODULAR GOITER: ICD-10-CM

## 2020-03-17 DIAGNOSIS — E06.3 HASHIMOTO'S THYROIDITIS: ICD-10-CM

## 2020-03-17 DIAGNOSIS — E03.8 SUBCLINICAL HYPOTHYROIDISM: ICD-10-CM

## 2020-03-17 PROCEDURE — 99214 OFFICE O/P EST MOD 30 MIN: CPT | Performed by: INTERNAL MEDICINE

## 2020-03-17 RX ORDER — LEVOTHYROXINE SODIUM 88 MCG
88 TABLET ORAL
Qty: 30 TAB | Refills: 2 | Status: SHIPPED | OUTPATIENT
Start: 2020-03-17 | End: 2020-06-26 | Stop reason: SDUPTHER

## 2020-03-17 ASSESSMENT — FIBROSIS 4 INDEX: FIB4 SCORE: 0.44

## 2020-03-17 NOTE — TELEPHONE ENCOUNTER
1. Caller Name: Pebbles Cosem          Call Back Number: [phone]  [facility] PCP or Specialty Provider: No          2.  Does patient have any active symptoms of respiratory illness (fever OR cough OR shortness of breath)? Yes, the patient reports the following respiratory symptoms: sore throat.    3.  Does patient have any comoribidities? None     4.  In the last 30 days, has the patient traveled outside of the country OR in a high risk area within the [place] OR have any known contact with someone who has or is suspected to have COVID-19?  No.    5. Disposition: Cleared by RN Triage; OK to keep/schedule appointment 3/17/20    Note routed to PCP: [facility] only.

## 2020-03-17 NOTE — PROGRESS NOTES
Chief Complaint: Follow up for Primary Hypothyroidism secondary to Hashimoto's thyroiditis and history of nontoxic multinodular goiter    HPI:     Pebbles Cosme is a 39 y.o. female here for follow up of Primary Hypothyroidism.  Since last visit patient reports feeling poor.  She remains on Synthroid 75 MCG daily which has been her dose for the past 3 months.   She reports excellent compliance and denies missing any daily doses.   She takes thyroid hormone prior to breakfast.   She  denies taking any iron, calcium supplements or antacids.      Weight has been stable    She currently reports fatigue, weight gain, feeling cold and cold intolerance which has been progressive and present for the past 3 months despite the recent increase in her thyroid hormone dose made by the PA 3 months ago.     She currently denies anxiousness, feeling excessive energy, tremulousness, palpitations, sweating, weight loss, diarrhea.       Her most recent TSH is suboptimal 2.98 on February 24, 2020    She also has a history of nontoxic multinodular goiter with last ultrasound completed in October 2019 with a dominant 1 cm hypoechoic solid nodule on the left mid lobe and subcentimeter nodules on the right lobe which appear to be low risk for malignancy per my read.  She denies a family history of thyroid cancer and denies radiation exposure.    She has a history of breast cancer that is triple negative and she completed chemotherapy and immunotherapy.  This is being monitored by her oncologist.      Patient's medications, allergies, and social histories were reviewed and updated as appropriate.      ROS:     CONS:     No fever, no chills   EYES:     No diplopia, no blurry vision   CV:           No chest pain, no palpitations   PULM:     No SOB, no cough, no hemoptysis.   GI:            No nausea, no vomiting, no diarrhea, no constipation   ENDO:     No polyuria, no polydipsia, no heat intolerance, reports cold intolerance        Past Medical History:  Problem List:  2020-03: Nontoxic multinodular goiter  2020-03: Hashimoto's thyroiditis  2020-01: Research study patient  2020-01: [facility] Billing  2019-02: Impaired fasting glucose  2019-02: Morbid obesity (HCC)  2019-02: Weight gain  2019-01: Subclinical hypothyroidism  2019-01: Class 3 severe obesity without serious comorbidity in adult   (HCC)  2019-01: Fatigue  2019-01: Vitamin D deficiency  2018-06: BRCA gene mutation positive in female  2018-06: Breast cancer ([facility])  2018-06: Infected breast tissue expander (Formerly [facility])  2018-03: Situational anxiety  2017-08: Generalized anxiety disorder with panic attacks  2015-06: H/O LEEP  2015-06: Family hx of ovarian malignancy  2015-06: Decreased energy  2015-06: Insomnia  2015-06: Anxiety      Past Surgical History:  Past Surgical History:   Procedure Laterality Date   • BREAST IMPLANT REMOVAL Right 6/18/2018    Procedure: BREAST EXPANDER  REMOVAL;  Surgeon: Niki Osuna M.D.;  Location: SURGERY [facility];  Service: Plastics   • FLAP GRAFT Right 5/16/2018    Procedure: FLAP GRAFT-   ADJACENT TISSUE TRANSFER OR REARRANGEMENT, TRUNK;  Surgeon: Niki Osuna M.D.;  Location: SURGERY SAME DAY [facility];  Service: Plastics   • OTHER  04/2018    bilateral mastectomy   • OTHER  03/2018    lymph node dissection   • OTHER  10/11/2017    port placement   • OPEN REDUCTION      R foot   • OTHER      wisdom teeth removed        Allergies:  Patient has no known allergies.     Social History:  Social History     Tobacco Use   • Smoking status: Former Smoker     Packs/day: 0.25     Types: Cigarettes   • Smokeless tobacco: Never Used   Substance Use Topics   • Alcohol use: No     Comment: can't drink on medications   • Drug use: No        Family History:   family history includes Cancer (age of onset: 45) in her mother; Heart Disease in her father; Hyperlipidemia in her father; Hypertension in her father.      PHYSICAL EXAM:  "  Vital signs: /76 (BP Location: Left arm, Patient Position: Sitting, BP Cuff Size: Adult)   Pulse 95   Ht 1.626 m (5' 4\")   Wt 85.2 kg (187 lb 14.4 oz)   SpO2 96%   BMI 32.25 kg/m²   GENERAL: Well-developed, well-nourished in no apparent distress.   EYE:  No ocular asymmetry, PERRLA  HENT: Pink, moist mucous membranes.    NECK: Thyroid is slightly enlarged and feels bosselated  CARDIOVASCULAR:  No murmurs  LUNGS: Clear breath sounds  ABDOMEN: Soft, nontender   EXTREMITIES: No clubbing, cyanosis, or edema.   NEUROLOGICAL: No gross focal motor abnormalities   LYMPH: No cervical adenopathy palpated.   SKIN: No rashes, lesions.       Labs:  Lab Results   Component Value Date/Time    SODIUM 134 (L) 02/24/2020 11:17 AM    POTASSIUM 3.7 02/24/2020 11:17 AM    CHLORIDE 101 02/24/2020 11:17 AM    CO2 23 02/24/2020 11:17 AM    ANION 10.0 02/24/2020 11:17 AM    GLUCOSE 110 (H) 02/24/2020 11:17 AM    BUN 16 02/24/2020 11:17 AM    CREATININE 0.85 02/24/2020 11:17 AM    CALCIUM 8.8 02/24/2020 11:17 AM    ASTSGOT 15 02/24/2020 11:17 AM    ALTSGPT 11 02/24/2020 11:17 AM    TBILIRUBIN 0.2 02/24/2020 11:17 AM    ALBUMIN 4.0 02/24/2020 11:17 AM    TOTPROTEIN 7.4 02/24/2020 11:17 AM    GLOBULIN 3.4 02/24/2020 11:17 AM    AGRATIO 1.2 02/24/2020 11:17 AM       Lab Results   Component Value Date/Time    SODIUM 134 (L) 02/24/2020 1117    POTASSIUM 3.7 02/24/2020 1117    CHLORIDE 101 02/24/2020 1117    CO2 23 02/24/2020 1117    GLUCOSE 110 (H) 02/24/2020 1117    BUN 16 02/24/2020 1117    CREATININE 0.85 02/24/2020 1117    CALCIUM 8.8 02/24/2020 1117    ANION 10.0 02/24/2020 1117       Lab Results   Component Value Date/Time    CHOLSTRLTOT 150 01/06/2017 1142    TRIGLYCERIDE 35 01/06/2017 1142    HDL 73 01/06/2017 1142    LDL 70 01/06/2017 1142       Lab Results   Component Value Date/Time    TSHULTRASEN 2.980 02/24/2020 1117     Lab Results   Component Value Date/Time    FREET4 1.03 12/03/2019 1120     Lab Results   Component " Value Date/Time    FREET3 3.48 12/03/2019 1120     No results found for: THYSTIMIG    Lab Results   Component Value Date/Time    MICROSOMALA 15 08/13/2019 1216         Imaging: See ultrasound from October 2019      ASSESSMENT/PLAN:     1. Subclinical hypothyroidism  Uncontrolled  TSH levels are suboptimal  She has symptoms of hypothyroidism  I am adjusting her medication to Synthroid 88 MCG daily  Reviewed importance of adherence  We will plan for follow-up in 2 to 3 months with a repeat of her TSH level    2. Hashimoto's thyroiditis  This is the etiology of her hypothyroidism  Explained to patient that 10% of individuals with Hashimoto's thyroiditis with negative antibodies  Ultrasound is more sensitive for the diagnosis of Hashimoto's disease    3. Nontoxic multinodular goiter  Stable  I reviewed the ultrasound images with the patient in detail  She has a 1 cm dominant hypoechoic wider than tall solid nodule on the left mid lobe with no suspicious microcalcifications  I recommend observation and repeating her ultrasound again on October 2020      Return in about 3 months (around 6/17/2020).      This patient during there office visit today was started on a new medication.  Side effects of the new medication were discussed with the patient today in the office.     Thank you kindly for allowing me to participate in the thyroid care plan for this patient.    Seth Benavides MD, TRICIA, [facility]  03/17/20    CC:   Pcp Pt States None

## 2020-06-09 ENCOUNTER — PATIENT MESSAGE (OUTPATIENT)
Dept: ENDOCRINOLOGY | Facility: MEDICAL CENTER | Age: 40
End: 2020-06-09

## 2020-06-18 ENCOUNTER — HOSPITAL ENCOUNTER (OUTPATIENT)
Dept: LAB | Facility: MEDICAL CENTER | Age: 40
End: 2020-06-18
Attending: INTERNAL MEDICINE
Payer: COMMERCIAL

## 2020-06-18 DIAGNOSIS — E06.3 HASHIMOTO'S THYROIDITIS: ICD-10-CM

## 2020-06-18 DIAGNOSIS — E03.8 SUBCLINICAL HYPOTHYROIDISM: ICD-10-CM

## 2020-06-18 DIAGNOSIS — E04.2 NONTOXIC MULTINODULAR GOITER: ICD-10-CM

## 2020-06-18 LAB
ALBUMIN SERPL BCP-MCNC: 4.2 G/DL (ref 3.2–4.9)
ALBUMIN/GLOB SERPL: 1.5 G/DL
ALP SERPL-CCNC: 76 U/L (ref 30–99)
ALT SERPL-CCNC: 11 U/L (ref 2–50)
ANION GAP SERPL CALC-SCNC: 13 MMOL/L (ref 7–16)
AST SERPL-CCNC: 16 U/L (ref 12–45)
BASOPHILS # BLD AUTO: 0.6 % (ref 0–1.8)
BASOPHILS # BLD: 0.05 K/UL (ref 0–0.12)
BILIRUB SERPL-MCNC: 0.2 MG/DL (ref 0.1–1.5)
BUN SERPL-MCNC: 17 MG/DL (ref 8–22)
CALCIUM SERPL-MCNC: 8.9 MG/DL (ref 8.5–10.5)
CHLORIDE SERPL-SCNC: 101 MMOL/L (ref 96–112)
CHOLEST SERPL-MCNC: 192 MG/DL (ref 100–199)
CO2 SERPL-SCNC: 23 MMOL/L (ref 20–33)
CREAT SERPL-MCNC: 0.66 MG/DL (ref 0.5–1.4)
EOSINOPHIL # BLD AUTO: 0.66 K/UL (ref 0–0.51)
EOSINOPHIL NFR BLD: 8.2 % (ref 0–6.9)
ERYTHROCYTE [DISTWIDTH] IN BLOOD BY AUTOMATED COUNT: 44.5 FL (ref 35.9–50)
FASTING STATUS PATIENT QL REPORTED: NORMAL
GLOBULIN SER CALC-MCNC: 2.8 G/DL (ref 1.9–3.5)
GLUCOSE SERPL-MCNC: 87 MG/DL (ref 65–99)
HCT VFR BLD AUTO: 39.6 % (ref 37–47)
HDLC SERPL-MCNC: 69 MG/DL
HGB BLD-MCNC: 12.7 G/DL (ref 12–16)
IMM GRANULOCYTES # BLD AUTO: 0.02 K/UL (ref 0–0.11)
IMM GRANULOCYTES NFR BLD AUTO: 0.2 % (ref 0–0.9)
LDLC SERPL CALC-MCNC: 110 MG/DL
LYMPHOCYTES # BLD AUTO: 2.88 K/UL (ref 1–4.8)
LYMPHOCYTES NFR BLD: 35.9 % (ref 22–41)
MCH RBC QN AUTO: 29.5 PG (ref 27–33)
MCHC RBC AUTO-ENTMCNC: 32.1 G/DL (ref 33.6–35)
MCV RBC AUTO: 92.1 FL (ref 81.4–97.8)
MONOCYTES # BLD AUTO: 0.46 K/UL (ref 0–0.85)
MONOCYTES NFR BLD AUTO: 5.7 % (ref 0–13.4)
NEUTROPHILS # BLD AUTO: 3.96 K/UL (ref 2–7.15)
NEUTROPHILS NFR BLD: 49.4 % (ref 44–72)
NRBC # BLD AUTO: 0 K/UL
NRBC BLD-RTO: 0 /100 WBC
PLATELET # BLD AUTO: 283 K/UL (ref 164–446)
PMV BLD AUTO: 8.7 FL (ref 9–12.9)
POTASSIUM SERPL-SCNC: 4.6 MMOL/L (ref 3.6–5.5)
PROT SERPL-MCNC: 7 G/DL (ref 6–8.2)
RBC # BLD AUTO: 4.3 M/UL (ref 4.2–5.4)
SODIUM SERPL-SCNC: 137 MMOL/L (ref 135–145)
T4 FREE SERPL-MCNC: 1.25 NG/DL (ref 0.93–1.7)
TRIGL SERPL-MCNC: 63 MG/DL (ref 0–149)
TSH SERPL DL<=0.005 MIU/L-ACNC: 1.7 UIU/ML (ref 0.38–5.33)
WBC # BLD AUTO: 8 K/UL (ref 4.8–10.8)

## 2020-06-18 PROCEDURE — 80061 LIPID PANEL: CPT

## 2020-06-18 PROCEDURE — 84439 ASSAY OF FREE THYROXINE: CPT

## 2020-06-18 PROCEDURE — 80053 COMPREHEN METABOLIC PANEL: CPT

## 2020-06-18 PROCEDURE — 84443 ASSAY THYROID STIM HORMONE: CPT

## 2020-06-18 PROCEDURE — 85025 COMPLETE CBC W/AUTO DIFF WBC: CPT

## 2020-06-18 PROCEDURE — 36415 COLL VENOUS BLD VENIPUNCTURE: CPT

## 2020-06-26 ENCOUNTER — TELEMEDICINE (OUTPATIENT)
Dept: ENDOCRINOLOGY | Facility: MEDICAL CENTER | Age: 40
End: 2020-06-26
Payer: COMMERCIAL

## 2020-06-26 DIAGNOSIS — E55.9 VITAMIN D DEFICIENCY: ICD-10-CM

## 2020-06-26 DIAGNOSIS — E06.3 HASHIMOTO'S THYROIDITIS: ICD-10-CM

## 2020-06-26 DIAGNOSIS — R53.83 FATIGUE, UNSPECIFIED TYPE: ICD-10-CM

## 2020-06-26 DIAGNOSIS — E04.2 NONTOXIC MULTINODULAR GOITER: ICD-10-CM

## 2020-06-26 DIAGNOSIS — E03.8 SUBCLINICAL HYPOTHYROIDISM: ICD-10-CM

## 2020-06-26 PROCEDURE — 99214 OFFICE O/P EST MOD 30 MIN: CPT | Mod: 95,CR | Performed by: INTERNAL MEDICINE

## 2020-06-26 RX ORDER — LEVOTHYROXINE SODIUM 88 MCG
88 TABLET ORAL
Qty: 30 TAB | Refills: 3 | Status: SHIPPED | OUTPATIENT
Start: 2020-06-26 | End: 2020-09-30

## 2020-06-26 NOTE — PROGRESS NOTES
Chief Complaint: Follow up for Primary Hypothyroidism secondary to Hashimoto's thyroiditis and history of nontoxic multinodular goiter.  Patient was presented for a telehealth consultation via secure and encrypted videoconferencing technology. This encounter was conducted via Zoom . Verbal consent was obtained. Patient's identity was verified.      HPI:     Pebbles Cosme is a 39 y.o. female here for follow up of Primary Hypothyroidism.  Since last visit patient reports feeling poor.  She remains on Synthroid 88 MCG daily which has been her dose for the past 3 months.   She reports excellent compliance and denies missing any daily doses.   She takes thyroid hormone prior to breakfast.   She  denies taking any iron, calcium supplements or antacids.      Weight has been stable    She currently still reports fatigue.  She no longer feels cold.  She denies constipation   She currently denies anxiousness, feeling excessive energy, tremulousness, palpitations, sweating, weight loss, diarrhea.       Her most recent TSH is better at 1.7 on June 18, 2020    She also has a history of nontoxic multinodular goiter with last ultrasound completed in October 2019 with a dominant 1 cm hypoechoic solid nodule on the left mid lobe and subcentimeter nodules on the right lobe which appear to be low risk for malignancy per my read.  She denies a family history of thyroid cancer and denies radiation exposure.    She has a history of breast cancer that is triple negative and she completed chemotherapy and immunotherapy.  This is being monitored by her oncologist.      Patient's medications, allergies, and social histories were reviewed and updated as appropriate.      ROS:     CONS:     No fever, no chills   EYES:     No diplopia, no blurry vision   CV:           No chest pain, no palpitations   PULM:     No SOB, no cough, no hemoptysis.   GI:            No nausea, no vomiting, no diarrhea, no constipation   ENDO:     No polyuria,  no polydipsia, no heat intolerance, reports cold intolerance       Past Medical History:  Problem List:  2020-03: Nontoxic multinodular goiter  2020-03: Hashimoto's thyroiditis  2020-01: Research study patient  2020-01: [facility]On[facility] Billing  2019-02: Impaired fasting glucose  2019-02: Morbid obesity (HCC)  2019-02: Weight gain  2019-01: Subclinical hypothyroidism  2019-01: Class 3 severe obesity without serious comorbidity in adult   (Formerly [facility])  2019-01: Fatigue  2019-01: Vitamin D deficiency  2018-06: BRCA gene mutation positive in female  2018-06: Breast cancer (Formerly [facility])  2018-06: Infected breast tissue expander (Formerly [facility])  2018-03: Situational anxiety  2017-08: Generalized anxiety disorder with panic attacks  2015-06: H/O LEEP  2015-06: Family hx of ovarian malignancy  2015-06: Decreased energy  2015-06: Insomnia  2015-06: Anxiety      Past Surgical History:  Past Surgical History:   Procedure Laterality Date   • BREAST IMPLANT REMOVAL Right 6/18/2018    Procedure: BREAST EXPANDER  REMOVAL;  Surgeon: Niki Osuna M.D.;  Location: SURGERY [facility];  Service: Plastics   • FLAP GRAFT Right 5/16/2018    Procedure: FLAP GRAFT-   ADJACENT TISSUE TRANSFER OR REARRANGEMENT, TRUNK;  Surgeon: Niki Osuna M.D.;  Location: SURGERY SAME DAY [facility];  Service: Plastics   • OTHER  04/2018    bilateral mastectomy   • OTHER  03/2018    lymph node dissection   • OTHER  10/11/2017    port placement   • OPEN REDUCTION      R foot   • OTHER      wisdom teeth removed        Allergies:  Patient has no known allergies.     Social History:  Social History     Tobacco Use   • Smoking status: Former Smoker     Packs/day: 0.25     Types: Cigarettes   • Smokeless tobacco: Never Used   Substance Use Topics   • Alcohol use: No     Comment: can't drink on medications   • Drug use: No        Family History:   family history includes Cancer (age of onset: 45) in her mother; Heart Disease in her father; Hyperlipidemia in her  father; Hypertension in her father.      PHYSICAL EXAM:   Vital signs: There were no vitals taken for this visit.  GENERAL: Well-developed, well-nourished in no apparent distress.   EYE:  No ocular asymmetry, PERRLA  HENT: Pink, moist mucous membranes.    NECK: Thyroid is slightly enlarged and feels bosselated  CARDIOVASCULAR:  No murmurs  LUNGS: Clear breath sounds  ABDOMEN: Soft, nontender   EXTREMITIES: No clubbing, cyanosis, or edema.   NEUROLOGICAL: No gross focal motor abnormalities   LYMPH: No cervical adenopathy palpated.   SKIN: No rashes, lesions.       Labs:  Lab Results   Component Value Date/Time    SODIUM 137 06/18/2020 10:13 AM    POTASSIUM 4.6 06/18/2020 10:13 AM    CHLORIDE 101 06/18/2020 10:13 AM    CO2 23 06/18/2020 10:13 AM    ANION 13.0 06/18/2020 10:13 AM    GLUCOSE 87 06/18/2020 10:13 AM    BUN 17 06/18/2020 10:13 AM    CREATININE 0.66 06/18/2020 10:13 AM    CALCIUM 8.9 06/18/2020 10:13 AM    ASTSGOT 16 06/18/2020 10:13 AM    ALTSGPT 11 06/18/2020 10:13 AM    TBILIRUBIN 0.2 06/18/2020 10:13 AM    ALBUMIN 4.2 06/18/2020 10:13 AM    TOTPROTEIN 7.0 06/18/2020 10:13 AM    GLOBULIN 2.8 06/18/2020 10:13 AM    AGRATIO 1.5 06/18/2020 10:13 AM       Lab Results   Component Value Date/Time    SODIUM 134 (L) 02/24/2020 1117    POTASSIUM 3.7 02/24/2020 1117    CHLORIDE 101 02/24/2020 1117    CO2 23 02/24/2020 1117    GLUCOSE 110 (H) 02/24/2020 1117    BUN 16 02/24/2020 1117    CREATININE 0.85 02/24/2020 1117    CALCIUM 8.8 02/24/2020 1117    ANION 10.0 02/24/2020 1117       Lab Results   Component Value Date/Time    CHOLSTRLTOT 150 01/06/2017 1142    TRIGLYCERIDE 35 01/06/2017 1142    HDL 73 01/06/2017 1142    LDL 70 01/06/2017 1142       Lab Results   Component Value Date/Time    TSHULTRASEN 2.980 02/24/2020 1117     Lab Results   Component Value Date/Time    FREET4 1.03 12/03/2019 1120     Lab Results   Component Value Date/Time    FREET3 3.48 12/03/2019 1120     No results found for: THYSTIMSERGE    Lab  Results   Component Value Date/Time    MICROSOMALA 15 08/13/2019 1216         Imaging: See ultrasound from October 2019      ASSESSMENT/PLAN:     1. Subclinical hypothyroidism  Improved control   Continue Synthroid 88 MCG daily  Reviewed importance of adherence  We will plan for follow-up in  3 months with a repeat of her TSH level    2. Hashimoto's thyroiditis  This is the etiology of her hypothyroidism  Explained to patient that 10% of individuals with Hashimoto's thyroiditis with negative antibodies  Ultrasound is more sensitive for the diagnosis of Hashimoto's disease    3. Nontoxic multinodular goiter  Stable  I reviewed the ultrasound images with the patient in detail  She has a 1 cm dominant hypoechoic wider than tall solid nodule on the left mid lobe with no suspicious microcalcifications  I recommend observation and repeating her ultrasound again on October 2020    4. Vitamin D deficiency  She reports a history of vitamin D deficiency I want her to start taking vitamin D3 5000 units a day and I will check her vitamin D and calcium levels with her next labs in 3 months    5. Fatigue, unspecified type  Unstable explained to her that her fatigue is not explained by her normal TSH levels I want her to start taking B12 and I will start checking her B12 levels with her next labs in 3 months      Return in about 3 months (around 9/26/2020).      This patient during there office visit today was started on a new medication.  Side effects of the new medication were discussed with the patient today in the office.     Thank you kindly for allowing me to participate in the thyroid care plan for this patient.    Seth Benavides MD, TRICIA, [facility], [facility]  03/17/20    CC:   Pcp Pt States None

## 2020-08-25 ENCOUNTER — TELEPHONE (OUTPATIENT)
Dept: SCHEDULING | Facility: IMAGING CENTER | Age: 40
End: 2020-08-25

## 2020-09-28 ENCOUNTER — HOSPITAL ENCOUNTER (OUTPATIENT)
Dept: LAB | Facility: MEDICAL CENTER | Age: 40
End: 2020-09-28
Attending: INTERNAL MEDICINE
Payer: COMMERCIAL

## 2020-09-28 ENCOUNTER — HOSPITAL ENCOUNTER (OUTPATIENT)
Dept: RADIOLOGY | Facility: MEDICAL CENTER | Age: 40
End: 2020-09-28
Attending: INTERNAL MEDICINE
Payer: COMMERCIAL

## 2020-09-28 DIAGNOSIS — E04.2 NONTOXIC MULTINODULAR GOITER: ICD-10-CM

## 2020-09-28 DIAGNOSIS — E06.3 HASHIMOTO'S THYROIDITIS: ICD-10-CM

## 2020-09-28 DIAGNOSIS — R53.83 FATIGUE, UNSPECIFIED TYPE: ICD-10-CM

## 2020-09-28 DIAGNOSIS — E03.8 SUBCLINICAL HYPOTHYROIDISM: ICD-10-CM

## 2020-09-28 DIAGNOSIS — E55.9 VITAMIN D DEFICIENCY: ICD-10-CM

## 2020-09-28 LAB
25(OH)D3 SERPL-MCNC: 53 NG/ML (ref 30–100)
ALBUMIN SERPL BCP-MCNC: 4 G/DL (ref 3.2–4.9)
ALBUMIN/GLOB SERPL: 1.7 G/DL
ALP SERPL-CCNC: 68 U/L (ref 30–99)
ALT SERPL-CCNC: 11 U/L (ref 2–50)
ANION GAP SERPL CALC-SCNC: 12 MMOL/L (ref 7–16)
AST SERPL-CCNC: 14 U/L (ref 12–45)
BILIRUB SERPL-MCNC: <0.2 MG/DL (ref 0.1–1.5)
BUN SERPL-MCNC: 19 MG/DL (ref 8–22)
CALCIUM SERPL-MCNC: 8.8 MG/DL (ref 8.5–10.5)
CHLORIDE SERPL-SCNC: 103 MMOL/L (ref 96–112)
CO2 SERPL-SCNC: 24 MMOL/L (ref 20–33)
CREAT SERPL-MCNC: 0.64 MG/DL (ref 0.5–1.4)
GLOBULIN SER CALC-MCNC: 2.4 G/DL (ref 1.9–3.5)
GLUCOSE SERPL-MCNC: 93 MG/DL (ref 65–99)
POTASSIUM SERPL-SCNC: 4.4 MMOL/L (ref 3.6–5.5)
PROT SERPL-MCNC: 6.4 G/DL (ref 6–8.2)
SODIUM SERPL-SCNC: 139 MMOL/L (ref 135–145)
T4 FREE SERPL-MCNC: 1.22 NG/DL (ref 0.93–1.7)
TSH SERPL DL<=0.005 MIU/L-ACNC: 1.74 UIU/ML (ref 0.38–5.33)
VIT B12 SERPL-MCNC: 567 PG/ML (ref 211–911)

## 2020-09-28 PROCEDURE — 82607 VITAMIN B-12: CPT

## 2020-09-28 PROCEDURE — 84443 ASSAY THYROID STIM HORMONE: CPT

## 2020-09-28 PROCEDURE — 80053 COMPREHEN METABOLIC PANEL: CPT

## 2020-09-28 PROCEDURE — 36415 COLL VENOUS BLD VENIPUNCTURE: CPT

## 2020-09-28 PROCEDURE — 82306 VITAMIN D 25 HYDROXY: CPT

## 2020-09-28 PROCEDURE — 76536 US EXAM OF HEAD AND NECK: CPT

## 2020-09-28 PROCEDURE — 84439 ASSAY OF FREE THYROXINE: CPT

## 2020-09-29 ENCOUNTER — OFFICE VISIT (OUTPATIENT)
Dept: MEDICAL GROUP | Facility: MEDICAL CENTER | Age: 40
End: 2020-09-29
Payer: COMMERCIAL

## 2020-09-29 VITALS
HEIGHT: 64 IN | BODY MASS INDEX: 32.81 KG/M2 | RESPIRATION RATE: 18 BRPM | WEIGHT: 192.2 LBS | DIASTOLIC BLOOD PRESSURE: 92 MMHG | TEMPERATURE: 99.7 F | HEART RATE: 70 BPM | SYSTOLIC BLOOD PRESSURE: 132 MMHG | OXYGEN SATURATION: 96 %

## 2020-09-29 DIAGNOSIS — F41.1 GENERALIZED ANXIETY DISORDER WITH PANIC ATTACKS: ICD-10-CM

## 2020-09-29 DIAGNOSIS — Z23 NEED FOR VACCINATION: ICD-10-CM

## 2020-09-29 DIAGNOSIS — F51.01 PRIMARY INSOMNIA: ICD-10-CM

## 2020-09-29 DIAGNOSIS — C50.912 MALIGNANT NEOPLASM OF LEFT FEMALE BREAST, UNSPECIFIED ESTROGEN RECEPTOR STATUS, UNSPECIFIED SITE OF BREAST (HCC): ICD-10-CM

## 2020-09-29 DIAGNOSIS — F41.0 GENERALIZED ANXIETY DISORDER WITH PANIC ATTACKS: ICD-10-CM

## 2020-09-29 DIAGNOSIS — K21.9 GASTROESOPHAGEAL REFLUX DISEASE, ESOPHAGITIS PRESENCE NOT SPECIFIED: ICD-10-CM

## 2020-09-29 DIAGNOSIS — E06.3 HASHIMOTO'S THYROIDITIS: ICD-10-CM

## 2020-09-29 DIAGNOSIS — F33.1 MODERATE EPISODE OF RECURRENT MAJOR DEPRESSIVE DISORDER (HCC): ICD-10-CM

## 2020-09-29 DIAGNOSIS — E66.01 MORBID OBESITY (HCC): ICD-10-CM

## 2020-09-29 PROBLEM — T85.79XA INFECTED BREAST TISSUE EXPANDER (HCC): Status: RESOLVED | Noted: 2018-06-15 | Resolved: 2020-09-29

## 2020-09-29 PROBLEM — F32.9 MAJOR DEPRESSIVE DISORDER: Status: ACTIVE | Noted: 2020-09-29

## 2020-09-29 PROBLEM — E03.8 SUBCLINICAL HYPOTHYROIDISM: Status: RESOLVED | Noted: 2019-01-24 | Resolved: 2020-09-29

## 2020-09-29 PROCEDURE — 99204 OFFICE O/P NEW MOD 45 MIN: CPT | Mod: 25 | Performed by: FAMILY MEDICINE

## 2020-09-29 PROCEDURE — 90471 IMMUNIZATION ADMIN: CPT | Performed by: FAMILY MEDICINE

## 2020-09-29 PROCEDURE — 90715 TDAP VACCINE 7 YRS/> IM: CPT | Performed by: FAMILY MEDICINE

## 2020-09-29 RX ORDER — BUPROPION HYDROCHLORIDE 150 MG/1
TABLET ORAL
COMMUNITY
Start: 2020-09-05 | End: 2022-05-06

## 2020-09-29 RX ORDER — OMEPRAZOLE 20 MG/1
20 CAPSULE, DELAYED RELEASE ORAL DAILY
Qty: 90 CAP | Refills: 1 | Status: SHIPPED | OUTPATIENT
Start: 2020-09-29 | End: 2021-03-31

## 2020-09-29 SDOH — HEALTH STABILITY: PHYSICAL HEALTH: ON AVERAGE, HOW MANY MINUTES DO YOU ENGAGE IN EXERCISE AT THIS LEVEL?: 50 MIN

## 2020-09-29 SDOH — SOCIAL STABILITY: SOCIAL NETWORK
DO YOU BELONG TO ANY CLUBS OR ORGANIZATIONS SUCH AS CHURCH GROUPS UNIONS, FRATERNAL OR ATHLETIC GROUPS, OR SCHOOL GROUPS?: NO

## 2020-09-29 SDOH — ECONOMIC STABILITY: HOUSING INSECURITY
IN THE LAST 12 MONTHS, WAS THERE A TIME WHEN YOU DID NOT HAVE A STEADY PLACE TO SLEEP OR SLEPT IN A SHELTER (INCLUDING NOW)?: NO

## 2020-09-29 SDOH — HEALTH STABILITY: MENTAL HEALTH: HOW OFTEN DO YOU HAVE A DRINK CONTAINING ALCOHOL?: 2-4 TIMES A MONTH

## 2020-09-29 SDOH — HEALTH STABILITY: PHYSICAL HEALTH: ON AVERAGE, HOW MANY DAYS PER WEEK DO YOU ENGAGE IN MODERATE TO STRENUOUS EXERCISE (LIKE A BRISK WALK)?: 5 DAYS

## 2020-09-29 SDOH — ECONOMIC STABILITY: TRANSPORTATION INSECURITY
IN THE PAST 12 MONTHS, HAS THE LACK OF TRANSPORTATION KEPT YOU FROM MEDICAL APPOINTMENTS OR FROM GETTING MEDICATIONS?: NO

## 2020-09-29 SDOH — ECONOMIC STABILITY: FOOD INSECURITY: WITHIN THE PAST 12 MONTHS, THE FOOD YOU BOUGHT JUST DIDN'T LAST AND YOU DIDN'T HAVE MONEY TO GET MORE.: NEVER TRUE

## 2020-09-29 SDOH — ECONOMIC STABILITY: TRANSPORTATION INSECURITY
IN THE PAST 12 MONTHS, HAS LACK OF TRANSPORTATION KEPT YOU FROM MEETINGS, WORK, OR FROM GETTING THINGS NEEDED FOR DAILY LIVING?: NO

## 2020-09-29 SDOH — ECONOMIC STABILITY: TRANSPORTATION INSECURITY
IN THE PAST 12 MONTHS, HAS LACK OF RELIABLE TRANSPORTATION KEPT YOU FROM MEDICAL APPOINTMENTS, MEETINGS, WORK OR FROM GETTING THINGS NEEDED FOR DAILY LIVING?: NO

## 2020-09-29 SDOH — ECONOMIC STABILITY: HOUSING INSECURITY: IN THE LAST 12 MONTHS, HOW MANY PLACES HAVE YOU LIVED?: 1

## 2020-09-29 SDOH — HEALTH STABILITY: MENTAL HEALTH
STRESS IS WHEN SOMEONE FEELS TENSE, NERVOUS, ANXIOUS, OR CAN'T SLEEP AT NIGHT BECAUSE THEIR MIND IS TROUBLED. HOW STRESSED ARE YOU?: RATHER MUCH

## 2020-09-29 SDOH — SOCIAL STABILITY: SOCIAL NETWORK: ARE YOU MARRIED, WIDOWED, DIVORCED, SEPARATED, NEVER MARRIED, OR LIVING WITH A PARTNER?: NEVER MARRIED

## 2020-09-29 SDOH — ECONOMIC STABILITY: INCOME INSECURITY: HOW HARD IS IT FOR YOU TO PAY FOR THE VERY BASICS LIKE FOOD, HOUSING, MEDICAL CARE, AND HEATING?: NOT VERY HARD

## 2020-09-29 SDOH — ECONOMIC STABILITY: FOOD INSECURITY: WITHIN THE PAST 12 MONTHS, YOU WORRIED THAT YOUR FOOD WOULD RUN OUT BEFORE YOU GOT MONEY TO BUY MORE.: NEVER TRUE

## 2020-09-29 SDOH — HEALTH STABILITY: MENTAL HEALTH: HOW MANY STANDARD DRINKS CONTAINING ALCOHOL DO YOU HAVE ON A TYPICAL DAY?: 3 OR 4

## 2020-09-29 SDOH — SOCIAL STABILITY: SOCIAL NETWORK: HOW OFTEN DO YOU ATTENT MEETINGS OF THE CLUB OR ORGANIZATION YOU BELONG TO?: NEVER

## 2020-09-29 SDOH — ECONOMIC STABILITY: INCOME INSECURITY: IN THE LAST 12 MONTHS, WAS THERE A TIME WHEN YOU WERE NOT ABLE TO PAY THE MORTGAGE OR RENT ON TIME?: NO

## 2020-09-29 SDOH — HEALTH STABILITY: PHYSICAL HEALTH: ON AVERAGE, HOW MANY MINUTES DO YOU ENGAGE IN EXERCISE AT THIS LEVEL?: 50 MINUTES

## 2020-09-29 SDOH — SOCIAL STABILITY: SOCIAL NETWORK: HOW OFTEN DO YOU ATTEND CHURCH OR RELIGIOUS SERVICES?: NEVER

## 2020-09-29 SDOH — SOCIAL STABILITY: SOCIAL NETWORK: HOW OFTEN DO YOU GET TOGETHER WITH FRIENDS OR RELATIVES?: ONCE A WEEK

## 2020-09-29 SDOH — SOCIAL STABILITY: SOCIAL NETWORK: IN A TYPICAL WEEK, HOW MANY TIMES DO YOU TALK ON THE PHONE WITH FAMILY, FRIENDS, OR NEIGHBORS?: THREE TIMES A WEEK

## 2020-09-29 ASSESSMENT — SOCIAL DETERMINANTS OF HEALTH (SDOH)
HOW HARD IS IT FOR YOU TO PAY FOR THE VERY BASICS LIKE FOOD, HOUSING, MEDICAL CARE, AND HEATING?: NOT VERY HARD
HOW MANY DRINKS CONTAINING ALCOHOL DO YOU HAVE ON A TYPICAL DAY WHEN YOU ARE DRINKING: 3 OR 4
WITHIN THE PAST 12 MONTHS, YOU WORRIED THAT YOUR FOOD WOULD RUN OUT BEFORE YOU GOT THE MONEY TO BUY MORE: NEVER TRUE
HOW OFTEN DO YOU HAVE A DRINK CONTAINING ALCOHOL: 2-4 TIMES A MONTH
HOW OFTEN DO YOU GET TOGETHER WITH FRIENDS OR RELATIVES?: ONCE A WEEK
HOW OFTEN DO YOU ATTEND CHURCH OR RELIGIOUS SERVICES?: NEVER
WITHIN THE PAST 12 MONTHS, THE FOOD YOU BOUGHT JUST DIDN'T LAST AND YOU DIDN'T HAVE MONEY TO GET MORE: NEVER TRUE
IN A TYPICAL WEEK, HOW MANY TIMES DO YOU TALK ON THE PHONE WITH FAMILY, FRIENDS, OR NEIGHBORS?: THREE TIMES A WEEK
HOW OFTEN DO YOU ATTENT MEETINGS OF THE CLUB OR ORGANIZATION YOU BELONG TO?: NEVER
DO YOU BELONG TO ANY CLUBS OR ORGANIZATIONS SUCH AS CHURCH GROUPS UNIONS, FRATERNAL OR ATHLETIC GROUPS, OR SCHOOL GROUPS?: NO
ARE YOU MARRIED, WIDOWED, DIVORCED, SEPARATED, NEVER MARRIED, OR LIVING WITH A PARTNER?: NEVER MARRIED

## 2020-09-29 ASSESSMENT — FIBROSIS 4 INDEX: FIB4 SCORE: 0.63

## 2020-09-29 ASSESSMENT — PATIENT HEALTH QUESTIONNAIRE - PHQ9
5. POOR APPETITE OR OVEREATING: 1 - SEVERAL DAYS
SUM OF ALL RESPONSES TO PHQ QUESTIONS 1-9: 13
CLINICAL INTERPRETATION OF PHQ2 SCORE: 2

## 2020-09-29 NOTE — ASSESSMENT & PLAN NOTE
This is a chronic problem.  The patient reports severe reflux while on chemotherapy.  She reports her symptoms are improving.  She is tapering her omeprazole.  She was previously on omeprazole 40 mg daily and has tapered to 20 mg daily. She has never had an EGD. Patient denies dysphagia, odynophagia, globus, choking, melena, hematochezia, or unintentional weight loss.

## 2020-09-29 NOTE — ASSESSMENT & PLAN NOTE
"The patient reports she \"ballooned\" during treatment for her breast cancer to 230 pounds. She is now back to 190lbs. She has worked with the health improvement program. She is working on re-establishing her exercise routine and eating a whole foods diet.    "

## 2020-09-29 NOTE — ASSESSMENT & PLAN NOTE
This is a chronic condition for which the patient follows with endocrinology, Dr. Benavides. Recent TSH WNL. Patient has an appointment with Dr. Benavides tomorrow.

## 2020-09-29 NOTE — ASSESSMENT & PLAN NOTE
The patient follows with psychiatry, Dr. Candy Zuniga.  She is on Wellbutrin XL for 150 mg q. morning, Xanax 0.25 mg as needed. She reports her symptoms are fairly well-controlled on this regimen.

## 2020-09-29 NOTE — ASSESSMENT & PLAN NOTE
The patient reports she was diagnosed with triple negative breast cancer 9/2017.  Lump initially identified in left breast.  She is BRCA1 positive.  Patient was treated with double mastectomy, chemo, radiation, and most recently immunotherapy which she finished 2/2020.  The patient follows with Dr. Whitehead every 3 months    Mastectomy completed at [facility], reconstructions completed at [facility] in [place].    Patient is s/p total hysterectomy completed within last six months by Dr. Woods. She is now on estradiol 1mg daily.

## 2020-09-29 NOTE — PROGRESS NOTES
"Subjective:     CC:  Diagnoses of Malignant neoplasm of left female breast, unspecified estrogen receptor status, unspecified site of breast (HCC), Morbid obesity (HCC), Primary insomnia, Generalized anxiety disorder with panic attacks, Hashimoto's thyroiditis, Need for vaccination, Gastroesophageal reflux disease, esophagitis presence not specified, and Moderate episode of recurrent major depressive disorder (HCC) were pertinent to this visit.    HISTORY OF THE PRESENT ILLNESS: Patient is a 40 y.o. female. She is here today to establish care. She works for [employer], is single, has no children, is originally from the [place] area. She is due for tdap and flu today, tdap administered, unfortunately the clinic is out of flu shots.  Prior PCP: None.    Breast cancer (HCC)  The patient reports she was diagnosed with triple negative breast cancer 9/2017.  Lump initially identified in left breast.  She is BRCA1 positive.  Patient was treated with double mastectomy, chemo, radiation, and most recently immunotherapy which she finished 2/2020.  The patient follows with Dr. Whitehead every 3 months    Mastectomy completed at [facility], reconstructions completed at [facility] in [place].    Patient is s/p total hysterectomy completed within last six months by Dr. Woods. She is now on estradiol 1mg daily.    Generalized anxiety disorder with panic attacks  Major Depressive Disorder  The patient follows with psychiatry, Dr. Candy Zuniga.  She is on Wellbutrin XL for 150 mg q. morning, Xanax 0.25 mg as needed. She reports her symptoms are fairly well-controlled on this regimen.    Morbid obesity (HCC)  The patient reports she \"ballooned\" during treatment for her breast cancer to 230 pounds. She is now back to 190lbs. She has worked with the health improvement program. She is working on re-establishing her exercise routine and eating a whole foods diet.    Insomnia    Thisis a chronic problem. She " reports her symptoms are well-controlled with melatonin qhs.    Hashimoto's thyroiditis  This is a chronic condition for which the patient follows with endocrinology, Dr. Benavides. Recent TSH WNL. Patient has an appointment with Dr. Benavides tomorrow.    GERD (gastroesophageal reflux disease)  This is a chronic problem.  The patient reports severe reflux while on chemotherapy.  She reports her symptoms are improving.  She is tapering her omeprazole.  She was previously on omeprazole 40 mg daily and has tapered to 20 mg daily. She has never had an EGD. Patient denies dysphagia, odynophagia, globus, choking, melena, hematochezia, or unintentional weight loss.        Allergies: Patient has no known allergies.    Current Outpatient Medications Ordered in Epic   Medication Sig Dispense Refill   • buPROPion (WELLBUTRIN XL) 150 MG XL tablet TK 1 T PO QD WITH 300 MG T     • omeprazole (PRILOSEC) 20 MG delayed-release capsule Take 1 Cap by mouth every day. 90 Cap 1   • SYNTHROID 88 MCG Tab Take 1 Tab by mouth Every morning on an empty stomach. 30 Tab 3   • ALPRAZolam (XANAX) 0.25 MG Tab      • buPROPion (WELLBUTRIN XL) 300 MG XL tablet      • estradiol (ESTRACE) 1 MG Tab Take  by mouth every day. Take 1 tablet by mouth every day     • amphetamine-dextroamphetamine (ADDERALL) 10 MG Tab      • Multiple Vitamins-Minerals (MULTIVITAMIN ADULT PO) Take 1 Tab by mouth every day.     • Cholecalciferol 4000 units Cap Take 1 Cap by mouth every day. 5000     • estradiol (VAGIFEM) 10 MCG Tab Insert 10 mcg in vagina every day.     • Ascorbic Acid (VITAMIN C) Powder Take 300 g by mouth.     • Melatonin 10 MG Cap Take 20 mg by mouth.     • TURMERIC CURCUMIN PO Take 1 Tab by mouth every day.     • multivitamin (THERAGRAN) Tab Take 1 Tab by mouth every day.     • vitamin D, Ergocalciferol, (DRISDOL) 65128 units Cap capsule Take 50,000 Units by mouth every 7 days.       No current [facility]-ordered facility-administered medications on file.         Past Medical History:   Diagnosis Date   • Anxiety    • Cancer (HCC) 11/2017    breast cancer   • Pain     edwar drains in place bilateral chest   • Psychiatric problem     anxiety       Past Surgical History:   Procedure Laterality Date   • BREAST IMPLANT REMOVAL Right 6/18/2018    Procedure: BREAST EXPANDER  REMOVAL;  Surgeon: Niki Osuna M.D.;  Location: SURGERY [facility];  Service: Plastics   • FLAP GRAFT Right 5/16/2018    Procedure: FLAP GRAFT-   ADJACENT TISSUE TRANSFER OR REARRANGEMENT, TRUNK;  Surgeon: Niki Osuna M.D.;  Location: SURGERY SAME DAY [facility];  Service: Plastics   • OTHER  04/2018    bilateral mastectomy   • OTHER  03/2018    lymph node dissection   • OTHER  10/11/2017    port placement   • ABDOMINAL HYSTERECTOMY TOTAL     • OPEN REDUCTION      R foot   • OTHER      wisdom teeth removed       Social History     Tobacco Use   • Smoking status: Light Tobacco Smoker     Packs/day: 0.03     Years: 0.00     Pack years: 0.00     Types: Cigarettes   • Smokeless tobacco: Never Used   Substance Use Topics   • Alcohol use: Yes     Frequency: 2-4 times a month     Drinks per session: 3 or 4     Comment: ocassional   • Drug use: No       Social History     Social History Narrative   • Not on file       Family History   Problem Relation Age of Onset   • Cancer Mother 45        ovarian ca   • Hypertension Father    • Heart Disease Father    • Hyperlipidemia Father        Health Maintenance: See above    ROS:   Gen: no fevers/chills  Eyes: no changes in vision  ENT: no sore throat or nasal congestion  Pulm: no sob, no cough  CV: no chest pain  GI: no nausea/vomiting, no diarrhea or constipation  : no dysuria  MSk: no myalgias  Skin: no rash  Neuro: no headaches, no numbness/tingling  Immuno: no seasonal allergies  Heme/Lymph: no easy bruising  Psych: anxiety and depression per above      Objective:       Exam: /92 (BP Location: Left arm, Patient Position: Sitting, BP Cuff  "Size: Adult)   Pulse 70   Temp 37.6 °C (99.7 °F) (Temporal)   Resp 18   Ht 1.626 m (5' 4\")   Wt 87.2 kg (192 lb 3.2 oz)   SpO2 96%  Body mass index is 32.99 kg/m².    General: Well-developed, well-nourished. Appears stated age.  Eyes: Normocephalic. Conjunctiva clear without injection or scleral icterus. Pupils equal and reactive to light.   HENT: Normocephalic, atraumatic. Ears normal shape and contour, canals are clear bilaterally, tympanic membranes pearly, oropharynx is clear without erythema, edema or exudates.   Neck: Supple; no obvious thyromegaly or nodules appreciated  Pulmonary: Clear to ausculation bilaterally.  No increased work of breathing. No rales, ronchi, or wheezing.  Cardiovascular: Regular rate and rhythm without murmur.  Abdomen: Soft, nontender, nondistended. Normal bowel sounds. No guarding or rebound tenderness.  Neurologic: CN III-XII intact.  Lymph: No cervical or supraclavicular lymphadenopathy palpated.  Skin: Warm and dry.  No obvious lesions.  Musculoskeletal: Normal gait. No extremity cyanosis, clubbing, or edema.  Psych: Euthymic affect. Alert and oriented x 3. Judgment and insight is normal.      Assessment & Plan:   40 y.o. female with the following -    1. Malignant neoplasm of left female breast, unspecified estrogen receptor status, unspecified site of breast (HCC)  Diagnosed 9/2017, triple negative, s/p bilateral mastectomy and reconstruction, chemo, radiation, and immunotherapy.   - Patient following with Dr. Whitehead q 3 months    2. Morbid obesity (HCC)  This is a chronic problem. The patient has lost about 40 pounds over the last year. She was working with the HIP program however did not feel this was very helpful for her.   - Discussed recommendation for diet rich in vegetables, fruits, fiber, minimal processed/packaged foods, and 150 minutes of moderate exercise per week.    3. Primary insomnia  - Continue melatonin 10mg qhs    4. Generalized anxiety disorder with " panic attacks  Patient is following with Dr. Zuniga.  - Continue wellbutrin XL 450mg and xanax PRN per psychiatry    5. Hashimoto's thyroiditis  This is a chronic condition for which the patient follows with Dr. Benavides. Recent TSH WNL.  - Continue synthroid 88mcg q morning, f/u w/ Dr. Benavides tomorrow    6. Need for vaccination  - TDAP VACCINE =>6YO IM    7. Gastroesophageal reflux disease, esophagitis presence not specified  This is a chronic condition. The patient reports severe reflux with chemotherapy which is now improving. Patient is aware of potential side effects of longterm PPI use and is weaning medication.  - Continue PPI taper  - Patient is actively working on weight loss      Return in about 6 months (around 3/29/2021).    Please note this dictation was created using voice recognition software. I have made every reasonable attempt to correct obvious errors, but I expect there may be errors of grammar, and possibly content, that I did not discover before finalizing the note.

## 2020-09-30 ENCOUNTER — OFFICE VISIT (OUTPATIENT)
Dept: ENDOCRINOLOGY | Facility: MEDICAL CENTER | Age: 40
End: 2020-09-30
Attending: INTERNAL MEDICINE
Payer: COMMERCIAL

## 2020-09-30 VITALS
BODY MASS INDEX: 32.47 KG/M2 | HEIGHT: 64 IN | DIASTOLIC BLOOD PRESSURE: 80 MMHG | HEART RATE: 76 BPM | SYSTOLIC BLOOD PRESSURE: 130 MMHG | WEIGHT: 190.2 LBS | OXYGEN SATURATION: 98 %

## 2020-09-30 DIAGNOSIS — E66.01 MORBID OBESITY (HCC): ICD-10-CM

## 2020-09-30 DIAGNOSIS — E06.3 HASHIMOTO'S THYROIDITIS: ICD-10-CM

## 2020-09-30 DIAGNOSIS — E55.9 VITAMIN D DEFICIENCY: ICD-10-CM

## 2020-09-30 DIAGNOSIS — E03.8 SUBCLINICAL HYPOTHYROIDISM: ICD-10-CM

## 2020-09-30 DIAGNOSIS — E04.2 NONTOXIC MULTINODULAR GOITER: ICD-10-CM

## 2020-09-30 PROCEDURE — 99211 OFF/OP EST MAY X REQ PHY/QHP: CPT | Performed by: INTERNAL MEDICINE

## 2020-09-30 PROCEDURE — 99214 OFFICE O/P EST MOD 30 MIN: CPT | Performed by: INTERNAL MEDICINE

## 2020-09-30 RX ORDER — PHENTERMINE HYDROCHLORIDE 37.5 MG/1
37.5 CAPSULE ORAL EVERY MORNING
Qty: 30 CAP | Refills: 5 | Status: SHIPPED | OUTPATIENT
Start: 2020-09-30 | End: 2020-10-30

## 2020-09-30 RX ORDER — LEVOTHYROXINE SODIUM 100 MCG
100 TABLET ORAL
Qty: 90 TAB | Refills: 2 | Status: SHIPPED | OUTPATIENT
Start: 2020-09-30 | End: 2020-12-29

## 2020-09-30 RX ORDER — PHENTERMINE HYDROCHLORIDE 37.5 MG/1
37.5 CAPSULE ORAL EVERY MORNING
Qty: 30 CAP | Refills: 5 | Status: SHIPPED | OUTPATIENT
Start: 2020-09-30 | End: 2020-09-30 | Stop reason: SDUPTHER

## 2020-09-30 ASSESSMENT — FIBROSIS 4 INDEX: FIB4 SCORE: 0.63

## 2020-09-30 NOTE — PROGRESS NOTES
Chief Complaint: Follow up for Primary Hypothyroidism secondary to Hashimoto's thyroiditis and history of nontoxic multinodular goiter.        HPI:     Pebbles Cosme is a 39 y.o. female here for follow up of Primary Hypothyroidism.  Since last visit patient reports feeling poor.  She remains on Synthroid 88 MCG daily which has been her dose for the past 3 months.   She reports excellent compliance and denies missing any daily doses.   She takes thyroid hormone prior to breakfast.   She  denies taking any iron, calcium supplements or antacids.      Weight has been stable    She currently still reports fatigue.  She no longer feels cold.  She denies constipation   She currently denies anxiousness, feeling excessive energy, tremulousness, palpitations, sweating, weight loss, diarrhea.       Her most recent TSH is stable at 1.7 with a free T4 of 1.2 on September 20, 2020  Her vitamin D is adequate at 53 on September 20, 2020        She also has a history of nontoxic multinodular goiter with last ultrasound completed in October 2019 with a dominant 1 cm hypoechoic solid nodule on the left mid lobe and subcentimeter nodule on the right lobe which appear to be low risk for malignancy per my read.  She denies a family history of thyroid cancer and denies radiation exposure.    Follow-up thyroid ultrasound completed on September 20, 2020 showed stable left mid lobe nodule with no evidence of significant change or growth.    She has a history of breast cancer that is triple negative and she completed chemotherapy and immunotherapy.  This is being monitored by her oncologist.      Patient's medications, allergies, and social histories were reviewed and updated as appropriate.      ROS:     CONS:     No fever, no chills   EYES:     No diplopia, no blurry vision   CV:           No chest pain, no palpitations   PULM:     No SOB, no cough, no hemoptysis.   GI:            No nausea, no vomiting, no diarrhea, no constipation    ENDO:     No polyuria, no polydipsia, no heat intolerance, denies cold intolerance       Past Medical History:  Problem List:  2020-09: GERD (gastroesophageal reflux disease)  2020-09: Major depressive disorder  2020-03: Nontoxic multinodular goiter  2020-03: Hashimoto's thyroiditis  2020-01: Research study patient  2020-01: [facility]  2019-02: Impaired fasting glucose  2019-02: Morbid obesity ([facility])  2019-02: Weight gain  2019-01: Subclinical hypothyroidism  2019-01: Class 3 severe obesity without serious comorbidity in adult   ([facility])  2019-01: Fatigue  2019-01: Vitamin D deficiency  2018-06: BRCA gene mutation positive in female  2018-06: Breast cancer ([facility])  2018-06: Infected breast tissue expander ([facility])  2018-03: Situational anxiety  2017-08: Generalized anxiety disorder with panic attacks  2015-06: H/O LEEP  2015-06: Family hx of ovarian malignancy  2015-06: Decreased energy  2015-06: Insomnia  2015-06: Anxiety      Past Surgical History:  Past Surgical History:   Procedure Laterality Date   • BREAST IMPLANT REMOVAL Right 6/18/2018    Procedure: BREAST EXPANDER  REMOVAL;  Surgeon: Niki Osuna M.D.;  Location: SURGERY [facility];  Service: Plastics   • FLAP GRAFT Right 5/16/2018    Procedure: FLAP GRAFT-   ADJACENT TISSUE TRANSFER OR REARRANGEMENT, TRUNK;  Surgeon: Niki Osuna M.D.;  Location: SURGERY SAME DAY [facility];  Service: Plastics   • OTHER  04/2018    bilateral mastectomy   • OTHER  03/2018    lymph node dissection   • OTHER  10/11/2017    port placement   • ABDOMINAL HYSTERECTOMY TOTAL     • OPEN REDUCTION      R foot   • OTHER      wisdom teeth removed        Allergies:  Patient has no known allergies.     Social History:  Social History     Tobacco Use   • Smoking status: Light Tobacco Smoker     Packs/day: 0.03     Years: 0.00     Pack years: 0.00     Types: Cigarettes   • Smokeless tobacco: Never Used   Substance Use Topics   • Alcohol use: Yes      "Frequency: 2-4 times a month     Drinks per session: 3 or 4     Comment: ocassional   • Drug use: No        Family History:   family history includes Cancer (age of onset: 45) in her mother; Heart Disease in her father; Hyperlipidemia in her father; Hypertension in her father.      PHYSICAL EXAM:   Vital signs: /80 (BP Location: Left arm, Patient Position: Sitting, BP Cuff Size: Large adult)   Pulse 76   Ht 1.626 m (5' 4\")   Wt 86.3 kg (190 lb 3.2 oz)   LMP 11/01/2017 (Approximate) Comment: last dose chemo 3/12/18.  SpO2 98%   BMI 32.65 kg/m²   GENERAL: Well-developed, well-nourished in no apparent distress.   EYE:  No ocular asymmetry, PERRLA  HENT: Pink, moist mucous membranes.    NECK: Thyroid is slightly enlarged and feels bosselated  CARDIOVASCULAR:  No murmurs  LUNGS: Clear breath sounds  ABDOMEN: Soft, nontender   EXTREMITIES: No clubbing, cyanosis, or edema.   NEUROLOGICAL: No gross focal motor abnormalities   LYMPH: No cervical adenopathy palpated.   SKIN: No rashes, lesions.       Labs:  Lab Results   Component Value Date/Time    SODIUM 139 09/28/2020 08:50 AM    POTASSIUM 4.4 09/28/2020 08:50 AM    CHLORIDE 103 09/28/2020 08:50 AM    CO2 24 09/28/2020 08:50 AM    ANION 12.0 09/28/2020 08:50 AM    GLUCOSE 93 09/28/2020 08:50 AM    BUN 19 09/28/2020 08:50 AM    CREATININE 0.64 09/28/2020 08:50 AM    CALCIUM 8.8 09/28/2020 08:50 AM    ASTSGOT 14 09/28/2020 08:50 AM    ALTSGPT 11 09/28/2020 08:50 AM    TBILIRUBIN <0.2 09/28/2020 08:50 AM    ALBUMIN 4.0 09/28/2020 08:50 AM    TOTPROTEIN 6.4 09/28/2020 08:50 AM    GLOBULIN 2.4 09/28/2020 08:50 AM    AGRATIO 1.7 09/28/2020 08:50 AM       Lab Results   Component Value Date/Time    SODIUM 134 (L) 02/24/2020 1117    POTASSIUM 3.7 02/24/2020 1117    CHLORIDE 101 02/24/2020 1117    CO2 23 02/24/2020 1117    GLUCOSE 110 (H) 02/24/2020 1117    BUN 16 02/24/2020 1117    CREATININE 0.85 02/24/2020 1117    CALCIUM 8.8 02/24/2020 1117    ANION 10.0 02/24/2020 " 1117       Lab Results   Component Value Date/Time    CHOLSTRLTOT 150 01/06/2017 1142    TRIGLYCERIDE 35 01/06/2017 1142    HDL 73 01/06/2017 1142    LDL 70 01/06/2017 1142       Lab Results   Component Value Date/Time    TSHULTRASEN 2.980 02/24/2020 1117     Lab Results   Component Value Date/Time    FREET4 1.03 12/03/2019 1120     Lab Results   Component Value Date/Time    FREET3 3.48 12/03/2019 1120     No results found for: THYSTIMIG    Lab Results   Component Value Date/Time    MICROSOMALA 15 08/13/2019 1216         Imaging: See ultrasound from September 2020 discussed and reviewed with the patient      ASSESSMENT/PLAN:     1. Subclinical hypothyroidism  Unstable  Adjust Synthroid 100 mcg daily  Reviewed importance of adherence  We will plan for follow-up in  3 months with a repeat of her TSH level    2. Hashimoto's thyroiditis  This is the etiology of her hypothyroidism  Explained to patient that 10% of individuals with Hashimoto's thyroiditis with negative antibodies  Ultrasound is more sensitive for the diagnosis of Hashimoto's disease    3. Nontoxic multinodular goiter  Stable  I reviewed the ultrasound images with the patient in detail  She has a 1 cm dominant hypoechoic wider than tall solid nodule on the left mid lobe with no suspicious microcalcifications  I recommend observation and repeating her ultrasound again on October 2021-2022    4. Vitamin D deficiency  Controlled  Continue current supplements  Repeat calcium 25-hydroxy vitamin D level in 6 months      5. Morbid obesity (HCC)  Unstable  Start phentermine  Reviewed side effects of the medication  Recommend that she follow a low-carb diet  Will reassess weight in 3 months      Return in about 3 months (around 12/30/2020).      This patient during there office visit today was started on a new medication.  Side effects of the new medication were discussed with the patient today in the office.     Thank you kindly for allowing me to participate in  the thyroid care plan for this patient.    Seth Benavides MD, FACE, ECNU  03/17/20    CC:   Pcp Pt States None

## 2020-10-28 ENCOUNTER — APPOINTMENT (OUTPATIENT)
Dept: HEMATOLOGY ONCOLOGY | Facility: MEDICAL CENTER | Age: 40
End: 2020-10-28
Payer: COMMERCIAL

## 2020-12-28 ENCOUNTER — HOSPITAL ENCOUNTER (OUTPATIENT)
Dept: LAB | Facility: MEDICAL CENTER | Age: 40
End: 2020-12-28
Attending: INTERNAL MEDICINE
Payer: COMMERCIAL

## 2020-12-28 DIAGNOSIS — E03.8 SUBCLINICAL HYPOTHYROIDISM: ICD-10-CM

## 2020-12-28 LAB
T4 FREE SERPL-MCNC: 1.56 NG/DL (ref 0.93–1.7)
TSH SERPL DL<=0.005 MIU/L-ACNC: 2.93 UIU/ML (ref 0.38–5.33)

## 2020-12-28 PROCEDURE — 84443 ASSAY THYROID STIM HORMONE: CPT

## 2020-12-28 PROCEDURE — 36415 COLL VENOUS BLD VENIPUNCTURE: CPT

## 2020-12-28 PROCEDURE — 84439 ASSAY OF FREE THYROXINE: CPT

## 2020-12-30 ENCOUNTER — TELEMEDICINE (OUTPATIENT)
Dept: ENDOCRINOLOGY | Facility: MEDICAL CENTER | Age: 40
End: 2020-12-30
Attending: INTERNAL MEDICINE
Payer: COMMERCIAL

## 2020-12-30 DIAGNOSIS — E66.01 MORBID OBESITY (HCC): ICD-10-CM

## 2020-12-30 DIAGNOSIS — E03.8 SUBCLINICAL HYPOTHYROIDISM: ICD-10-CM

## 2020-12-30 DIAGNOSIS — E04.2 NONTOXIC MULTINODULAR GOITER: ICD-10-CM

## 2020-12-30 DIAGNOSIS — E06.3 HASHIMOTO'S THYROIDITIS: ICD-10-CM

## 2020-12-30 DIAGNOSIS — E55.9 VITAMIN D DEFICIENCY: ICD-10-CM

## 2020-12-30 PROCEDURE — 99214 OFFICE O/P EST MOD 30 MIN: CPT | Mod: 95,CR | Performed by: INTERNAL MEDICINE

## 2020-12-30 PROCEDURE — 999999 HB NO CHARGE: Mod: 95 | Performed by: INTERNAL MEDICINE

## 2020-12-30 RX ORDER — LEVOTHYROXINE SODIUM 100 MCG
100 TABLET ORAL
Qty: 90 TAB | Refills: 3 | Status: SHIPPED | OUTPATIENT
Start: 2020-12-30 | End: 2021-06-18

## 2020-12-30 NOTE — PROGRESS NOTES
Chief Complaint: Follow up for Primary Hypothyroidism secondary to Hashimoto's thyroiditis and history of nontoxic multinodular goiter.  Patient was presented for a telehealth consultation via secure and encrypted videoconferencing technology. This encounter was conducted via Zoom . Verbal consent was obtained. Patient's identity was verified.    HPI:     Pebbles Cosme is a 39 y.o. female here for follow up of Primary Hypothyroidism.  Since last visit patient reports feeling poor.  She remains on Synthroid 100 MCG daily which has been her dose for the past 3 months.   She  denies taking any iron, calcium supplements or antacids.  She admits to missing doses.    Weight has decrease by 4 lbs in 3 months  She reports phentermine helped    She currently reports better   She currently denies anxiousness, feeling excessive energy, tremulousness, palpitations, sweating, weight loss, diarrhea.         Her most recent TSH was 2.9 with a free T4 of 1.5 on Dec 12/2020    Her vitamin D is adequate at 53 on September 20, 2020        She also has a history of nontoxic multinodular goiter with last ultrasound completed in October 2019 with a dominant 1 cm hypoechoic solid nodule on the left mid lobe and subcentimeter nodule on the right lobe which appear to be low risk for malignancy per my read.  She denies a family history of thyroid cancer and denies radiation exposure.    Follow-up thyroid ultrasound completed on September 20, 2020 showed stable left mid lobe nodule with no evidence of significant change or growth.        She has a history of breast cancer that is triple negative and she completed chemotherapy and immunotherapy.  This is being monitored by her oncologist.      Patient's medications, allergies, and social histories were reviewed and updated as appropriate.      ROS:     CONS:     No fever, no chills   EYES:     No diplopia, no blurry vision   CV:           No chest pain, no palpitations   PULM:     No  SOB, no cough, no hemoptysis.   GI:            No nausea, no vomiting, no diarrhea, no constipation   ENDO:     No polyuria, no polydipsia, no heat intolerance, denies cold intolerance       Past Medical History:  Problem List:  2020-09: GERD (gastroesophageal reflux disease)  2020-09: Major depressive disorder  2020-03: Nontoxic multinodular goiter  2020-03: Hashimoto's thyroiditis  2020-01: Research study patient  [facility]  2019-02: Impaired fasting glucose  2019-02: Morbid obesity ([facility])  2019-02: Weight gain  2019-01: Subclinical hypothyroidism  2019-01: Class 3 severe obesity without serious comorbidity in adult   ([facility])  2019-01: Fatigue  2019-01: Vitamin D deficiency  2018-06: BRCA gene mutation positive in female  2018-06: Breast cancer ([facility])  2018-06: Infected breast tissue expander ([facility])  2018-03: Situational anxiety  2017-08: Generalized anxiety disorder with panic attacks  2015-06: H/O LEEP  2015-06: Family hx of ovarian malignancy  2015-06: Decreased energy  2015-06: Insomnia  2015-06: Anxiety      Past Surgical History:  Past Surgical History:   Procedure Laterality Date   • BREAST IMPLANT REMOVAL Right 6/18/2018    Procedure: BREAST EXPANDER  REMOVAL;  Surgeon: Niki Osuna M.D.;  Location: SURGERY [facility];  Service: Plastics   • FLAP GRAFT Right 5/16/2018    Procedure: FLAP GRAFT-   ADJACENT TISSUE TRANSFER OR REARRANGEMENT, TRUNK;  Surgeon: Niki Osuna M.D.;  Location: SURGERY SAME DAY [facility];  Service: Plastics   • OTHER  04/2018    bilateral mastectomy   • OTHER  03/2018    lymph node dissection   • OTHER  10/11/2017    port placement   • ABDOMINAL HYSTERECTOMY TOTAL     • OPEN REDUCTION      R foot   • OTHER      wisdom teeth removed        Allergies:  Patient has no known allergies.     Social History:  Social History     Tobacco Use   • Smoking status: Light Tobacco Smoker     Packs/day: 0.03     Years: 0.00     Pack years: 0.00      Types: Cigarettes   • Smokeless tobacco: Never Used   Substance Use Topics   • Alcohol use: Yes     Frequency: 2-4 times a month     Drinks per session: 3 or 4     Comment: ocassional   • Drug use: No        Family History:   family history includes Cancer (age of onset: 45) in her mother; Heart Disease in her father; Hyperlipidemia in her father; Hypertension in her father.      PHYSICAL EXAM:   Vital signs: LMP 11/01/2017 (Approximate) Comment: last dose chemo 3/12/18.  GENERAL: Well-developed, well-nourished in no apparent distress.   EYE:  No ocular asymmetry, PERRLA  HENT: Pink, moist mucous membranes.    NECK: Thyroid is slightly enlarged and feels bosselated  CARDIOVASCULAR:  No murmurs  LUNGS: Clear breath sounds  ABDOMEN: Soft, nontender   EXTREMITIES: No clubbing, cyanosis, or edema.   NEUROLOGICAL: No gross focal motor abnormalities   LYMPH: No cervical adenopathy seen  SKIN: No rashes, lesions.       Labs:  Lab Results   Component Value Date/Time    SODIUM 139 09/28/2020 08:50 AM    POTASSIUM 4.4 09/28/2020 08:50 AM    CHLORIDE 103 09/28/2020 08:50 AM    CO2 24 09/28/2020 08:50 AM    ANION 12.0 09/28/2020 08:50 AM    GLUCOSE 93 09/28/2020 08:50 AM    BUN 19 09/28/2020 08:50 AM    CREATININE 0.64 09/28/2020 08:50 AM    CALCIUM 8.8 09/28/2020 08:50 AM    ASTSGOT 14 09/28/2020 08:50 AM    ALTSGPT 11 09/28/2020 08:50 AM    TBILIRUBIN <0.2 09/28/2020 08:50 AM    ALBUMIN 4.0 09/28/2020 08:50 AM    TOTPROTEIN 6.4 09/28/2020 08:50 AM    GLOBULIN 2.4 09/28/2020 08:50 AM    AGRATIO 1.7 09/28/2020 08:50 AM       Lab Results   Component Value Date/Time    SODIUM 134 (L) 02/24/2020 1117    POTASSIUM 3.7 02/24/2020 1117    CHLORIDE 101 02/24/2020 1117    CO2 23 02/24/2020 1117    GLUCOSE 110 (H) 02/24/2020 1117    BUN 16 02/24/2020 1117    CREATININE 0.85 02/24/2020 1117    CALCIUM 8.8 02/24/2020 1117    ANION 10.0 02/24/2020 1117       Lab Results   Component Value Date/Time    PÉREZ 150 01/06/2017 1142     TRIGLYCERIDE 35 01/06/2017 1142    HDL 73 01/06/2017 1142    LDL 70 01/06/2017 1142       Lab Results   Component Value Date/Time    TSHULTRASEN 2.980 02/24/2020 1117     Lab Results   Component Value Date/Time    FREET4 1.03 12/03/2019 1120     Lab Results   Component Value Date/Time    FREET3 3.48 12/03/2019 1120     No results found for: THYSTIMIG    Lab Results   Component Value Date/Time    MICROSOMALA 15 08/13/2019 1216         Imaging: See ultrasound from September 2020 discussed and reviewed with the patient      ASSESSMENT/PLAN:     1. Subclinical hypothyroidism  Stable  Continue  Synthroid 100 mcg daily  Reviewed importance of adherence  We will plan for follow-up in 6 months with a repeat of her TSH level    2. Hashimoto's thyroiditis  This is the etiology of her hypothyroidism    3. Nontoxic multinodular goiter  Stable  I reviewed the ultrasound images with the patient in detail  She has a 1 cm dominant hypoechoic wider than tall solid nodule on the left mid lobe with no suspicious microcalcifications  I recommend observation and repeating her ultrasound again on October 2021-2022    4. Vitamin D deficiency  Controlled  Continue current supplements  Repeat calcium 25-hydroxy vitamin D level in 6 months    5. Morbid obesity (HCC)  Improved  continue phentermine  Reviewed side effects of the medication  Recommend that she follow a low-carb diet  Will reassess weight in 6 months      Return in about 6 months (around 6/30/2021).      This patient during there office visit today was started on a new medication.  Side effects of the new medication were discussed with the patient today in the office.     Thank you kindly for allowing me to participate in the thyroid care plan for this patient.    Seth Benavides MD, FACE, [facility]  03/17/20    CC:   Pcp Pt States None

## 2021-03-12 ENCOUNTER — OFFICE VISIT (OUTPATIENT)
Dept: URGENT CARE | Facility: PHYSICIAN GROUP | Age: 41
End: 2021-03-12
Payer: COMMERCIAL

## 2021-03-12 ENCOUNTER — HOSPITAL ENCOUNTER (OUTPATIENT)
Facility: MEDICAL CENTER | Age: 41
End: 2021-03-12
Attending: PHYSICIAN ASSISTANT
Payer: COMMERCIAL

## 2021-03-12 VITALS
SYSTOLIC BLOOD PRESSURE: 142 MMHG | RESPIRATION RATE: 14 BRPM | WEIGHT: 171 LBS | HEART RATE: 89 BPM | TEMPERATURE: 99.2 F | OXYGEN SATURATION: 97 % | HEIGHT: 64 IN | DIASTOLIC BLOOD PRESSURE: 90 MMHG | BODY MASS INDEX: 29.19 KG/M2

## 2021-03-12 DIAGNOSIS — J02.9 PHARYNGITIS, UNSPECIFIED ETIOLOGY: ICD-10-CM

## 2021-03-12 DIAGNOSIS — J36 CELLULITIS OF TONSIL: ICD-10-CM

## 2021-03-12 LAB
INT CON NEG: NEGATIVE
INT CON POS: POSITIVE
S PYO AG THROAT QL: NORMAL

## 2021-03-12 PROCEDURE — 99214 OFFICE O/P EST MOD 30 MIN: CPT | Performed by: PHYSICIAN ASSISTANT

## 2021-03-12 PROCEDURE — 87070 CULTURE OTHR SPECIMN AEROBIC: CPT

## 2021-03-12 PROCEDURE — 87880 STREP A ASSAY W/OPTIC: CPT | Performed by: PHYSICIAN ASSISTANT

## 2021-03-12 RX ORDER — AMOXICILLIN AND CLAVULANATE POTASSIUM 875; 125 MG/1; MG/1
1 TABLET, FILM COATED ORAL 2 TIMES DAILY
Qty: 20 TABLET | Refills: 0 | Status: SHIPPED | OUTPATIENT
Start: 2021-03-12 | End: 2021-03-22

## 2021-03-12 RX ORDER — DEXAMETHASONE SODIUM PHOSPHATE 10 MG/ML
10 INJECTION INTRAMUSCULAR; INTRAVENOUS ONCE
Status: COMPLETED | OUTPATIENT
Start: 2021-03-12 | End: 2021-03-12

## 2021-03-12 RX ADMIN — DEXAMETHASONE SODIUM PHOSPHATE 10 MG: 10 INJECTION INTRAMUSCULAR; INTRAVENOUS at 09:57

## 2021-03-12 ASSESSMENT — FIBROSIS 4 INDEX: FIB4 SCORE: 0.63

## 2021-03-12 ASSESSMENT — ENCOUNTER SYMPTOMS
ABDOMINAL PAIN: 0
SHORTNESS OF BREATH: 0
NAUSEA: 0
VOMITING: 0
HEADACHES: 0
CHILLS: 0
SORE THROAT: 1
DIARRHEA: 0
EYE PAIN: 0
COUGH: 0
MYALGIAS: 0
FEVER: 0
CONSTIPATION: 0

## 2021-03-12 NOTE — PROGRESS NOTES
Subjective:   Pebbles Cosme is a 40 y.o. female who presents for Sore Throat (onset yesterday)      HPI:  This is a 40-year-old female with a history of recurrent strep pharyngitis as well as other group C & G strep infections presenting to urgent care with acute onset sore throat yesterday morning.  She notes lateralization of the sore throat with worse on the right side as well as some right-sided neck discomfort.  She notes discomfort when swallowing liquids and solids however she has been tolerating her fluids normally.  She is not noticed any change in phonation.  She has had some mild chills however no overt fevers.  She has no recent sick contacts, no recent travel.     Review of Systems   Constitutional: Positive for malaise/fatigue. Negative for chills and fever.   HENT: Positive for sore throat. Negative for congestion and ear pain.    Eyes: Negative for pain.   Respiratory: Negative for cough and shortness of breath.    Cardiovascular: Negative for chest pain.   Gastrointestinal: Negative for abdominal pain, constipation, diarrhea, nausea and vomiting.   Genitourinary: Negative for dysuria.   Musculoskeletal: Negative for myalgias.   Skin: Negative for rash.   Neurological: Negative for headaches.       Medications:    • ALPRAZolam Tabs  • amphetamine-dextroamphetamine Tabs  • buPROPion  • Cholecalciferol Caps  • estradiol Tabs  • Melatonin Caps  • MULTIVITAMIN ADULT PO  • multivitamin Tabs  • omeprazole  • Synthroid Tabs  • TURMERIC CURCUMIN PO  • Vitamin C Powd  • vitamin D (Ergocalciferol) Caps    Allergies: Patient has no known allergies.    Problem List: Pebbles Cosme has H/O LEEP; Family hx of ovarian malignancy; Insomnia; Anxiety; Generalized anxiety disorder with panic attacks; Breast cancer (HCC); BRCA gene mutation positive in female; Subclinical hypothyroidism; Fatigue; Vitamin D deficiency; Impaired fasting glucose; Morbid obesity (HCC); Weight gain; Research study patient;  "[facility] Billing; Nontoxic multinodular goiter; Hashimoto's thyroiditis; GERD (gastroesophageal reflux disease); and Major depressive disorder on their problem list.    Surgical History:  Past Surgical History:   Procedure Laterality Date   • BREAST IMPLANT REMOVAL Right 6/18/2018    Procedure: BREAST EXPANDER  REMOVAL;  Surgeon: Niki Osuna M.D.;  Location: SURGERY [facility];  Service: Plastics   • FLAP GRAFT Right 5/16/2018    Procedure: FLAP GRAFT-   ADJACENT TISSUE TRANSFER OR REARRANGEMENT, TRUNK;  Surgeon: Niki Osuna M.D.;  Location: SURGERY SAME DAY [facility];  Service: Plastics   • OTHER  04/2018    bilateral mastectomy   • OTHER  03/2018    lymph node dissection   • OTHER  10/11/2017    port placement   • ABDOMINAL HYSTERECTOMY TOTAL     • OPEN REDUCTION      R foot   • OTHER      wisdom teeth removed       Past Social Hx: Pebbles Cosme  reports that she has been smoking cigarettes. She has been smoking about 0.03 packs per day for the past 0.00 years. She has never used smokeless tobacco. She reports current alcohol use. She reports that she does not use drugs.     Past Family Hx:  Pebbles Cosme family history includes Cancer (age of onset: 45) in her mother; Heart Disease in her father; Hyperlipidemia in her father; Hypertension in her father.     Problem list, medications, and allergies reviewed by myself today in Epic.     Objective:     /90   Pulse 89   Temp 37.3 °C (99.2 °F)   Resp 14   Ht 1.626 m (5' 4\")   Wt 77.6 kg (171 lb)   LMP 11/01/2017 (Approximate) Comment: last dose chemo 3/12/18.  SpO2 97%   BMI 29.35 kg/m²     Physical Exam  Vitals reviewed.   Constitutional:       Appearance: Normal appearance.      Comments: Normal phonation, no hot potato voice   HENT:      Head: Normocephalic and atraumatic.      Right Ear: External ear normal.      Left Ear: External ear normal.      Nose: Nose normal.      Mouth/Throat:      Mouth: " Mucous membranes are moist.      Comments: Erythematous posterior oropharynx, right-sided tonsil 3+ hypertrophy with exudate, left side is 2+ without exudate.  Uvula is midline.  Patent airway.  Eyes:      Conjunctiva/sclera: Conjunctivae normal.   Cardiovascular:      Rate and Rhythm: Normal rate.   Pulmonary:      Effort: Pulmonary effort is normal.   Lymphadenopathy:      Cervical: Cervical adenopathy (Right greater than left anterior and posterior) present.   Skin:     General: Skin is warm and dry.      Capillary Refill: Capillary refill takes less than 2 seconds.   Neurological:      Mental Status: She is alert and oriented to person, place, and time.         Lab Results/POC Test Results   Results for orders placed or performed in visit on 03/12/21   POCT Rapid Strep A   Result Value Ref Range    Rapid Strep Screen Neg     Internal Control Positive Positive     Internal Control Negative Negative            Assessment/Plan:     Diagnosis and associated orders:     1. Pharyngitis, unspecified etiology  POCT Rapid Strep A    CULTURE THROAT    dexamethasone (DECADRON) injection (check route below) 10 mg    amoxicillin-clavulanate (AUGMENTIN) 875-125 MG Tab   2. Cellulitis of tonsil        Comments/MDM:     • Rapid strep is negative  • Concerned about early peritonsillar cellulitis versus peritonsillar abscess based on unilateral hypertrophy on the right side.  • On conversation with patient about risk versus benefits of steroids and empiric antibiotics.  • After long discussion patient and I agreed using shared decision making to do a single dose of Decadron orally here now and initiate Augmentin.  • We will perform throat culture and if culture is negative can de-escalate antibiotics  • History and physical does not support a concerning peritonsillar abscess or airway risk at this time, however I educated the patient on peritonsillar abscess and indications for ER presentation.  She demonstrated good  understanding of these instructions and was appropriately concerned.  • History and physical do not support a retropharyngeal abscess, Jonathan's angina, or other more serious etiology.  This could represent a viral presentation but due to the lateralization and concerns of peritonsillar abscess, also in light of her rapid progression I think is reasonable to cover with empiric broad-spectrum Augmentin.         Differential diagnosis, natural history, supportive care, and indications for immediate follow-up discussed.    Advised the patient to follow-up with the primary care physician for recheck, reevaluation, and consideration of further management.    Please note that this dictation was created using voice recognition software. I have made a reasonable attempt to correct obvious errors, but I expect that there are errors of grammar and possibly content that I did not discover before finalizing the note.    This note was electronically signed by Justin Arevalo PA-C

## 2021-03-14 LAB
BACTERIA SPEC RESP CULT: NORMAL
SIGNIFICANT IND 70042: NORMAL
SITE SITE: NORMAL
SOURCE SOURCE: NORMAL

## 2021-03-31 RX ORDER — OMEPRAZOLE 20 MG/1
CAPSULE, DELAYED RELEASE ORAL
Qty: 90 CAPSULE | Refills: 1 | Status: SHIPPED | OUTPATIENT
Start: 2021-03-31 | End: 2021-09-28

## 2021-04-26 DIAGNOSIS — E66.01 MORBID OBESITY (HCC): ICD-10-CM

## 2021-04-26 NOTE — TELEPHONE ENCOUNTER
Received request via: Pharmacy    Was the patient seen in the last year in this department? Yes    Does the patient have an active prescription (recently filled or refills available) for medication(s) requested? No       phentermine 37.5 MG capsule       Sig: TAKE 1 CAPSULE BY MOUTH EVERY MORNING

## 2021-05-28 RX ORDER — PHENTERMINE HYDROCHLORIDE 37.5 MG/1
CAPSULE ORAL
OUTPATIENT
Start: 2021-05-28

## 2021-06-03 ENCOUNTER — OFFICE VISIT (OUTPATIENT)
Dept: URGENT CARE | Facility: PHYSICIAN GROUP | Age: 41
End: 2021-06-03
Payer: COMMERCIAL

## 2021-06-03 ENCOUNTER — HOSPITAL ENCOUNTER (OUTPATIENT)
Dept: LAB | Facility: MEDICAL CENTER | Age: 41
End: 2021-06-03
Attending: OBSTETRICS & GYNECOLOGY
Payer: COMMERCIAL

## 2021-06-03 ENCOUNTER — HOSPITAL ENCOUNTER (OUTPATIENT)
Dept: LAB | Facility: MEDICAL CENTER | Age: 41
End: 2021-06-03
Attending: INTERNAL MEDICINE
Payer: COMMERCIAL

## 2021-06-03 VITALS
DIASTOLIC BLOOD PRESSURE: 82 MMHG | TEMPERATURE: 98.2 F | WEIGHT: 163 LBS | OXYGEN SATURATION: 99 % | SYSTOLIC BLOOD PRESSURE: 128 MMHG | BODY MASS INDEX: 27.83 KG/M2 | HEIGHT: 64 IN | RESPIRATION RATE: 14 BRPM | HEART RATE: 78 BPM

## 2021-06-03 DIAGNOSIS — E04.2 NONTOXIC MULTINODULAR GOITER: ICD-10-CM

## 2021-06-03 DIAGNOSIS — E03.8 SUBCLINICAL HYPOTHYROIDISM: ICD-10-CM

## 2021-06-03 DIAGNOSIS — E66.01 MORBID OBESITY (HCC): ICD-10-CM

## 2021-06-03 DIAGNOSIS — E06.3 HASHIMOTO'S THYROIDITIS: ICD-10-CM

## 2021-06-03 DIAGNOSIS — E55.9 VITAMIN D DEFICIENCY: ICD-10-CM

## 2021-06-03 LAB
ALBUMIN SERPL BCP-MCNC: 3.9 G/DL (ref 3.2–4.9)
ALBUMIN/GLOB SERPL: 1.4 G/DL
ALP SERPL-CCNC: 47 U/L (ref 30–99)
ALT SERPL-CCNC: 15 U/L (ref 2–50)
ANION GAP SERPL CALC-SCNC: 9 MMOL/L (ref 7–16)
AST SERPL-CCNC: 15 U/L (ref 12–45)
BILIRUB SERPL-MCNC: 0.3 MG/DL (ref 0.1–1.5)
BUN SERPL-MCNC: 14 MG/DL (ref 8–22)
CALCIUM SERPL-MCNC: 8.9 MG/DL (ref 8.5–10.5)
CHLORIDE SERPL-SCNC: 107 MMOL/L (ref 96–112)
CO2 SERPL-SCNC: 24 MMOL/L (ref 20–33)
CREAT SERPL-MCNC: 0.78 MG/DL (ref 0.5–1.4)
GLOBULIN SER CALC-MCNC: 2.8 G/DL (ref 1.9–3.5)
GLUCOSE SERPL-MCNC: 91 MG/DL (ref 65–99)
HCV AB SER QL: NORMAL
HIV 1+2 AB+HIV1 P24 AG SERPL QL IA: NORMAL
POTASSIUM SERPL-SCNC: 3.6 MMOL/L (ref 3.6–5.5)
PROT SERPL-MCNC: 6.7 G/DL (ref 6–8.2)
SODIUM SERPL-SCNC: 140 MMOL/L (ref 135–145)
T4 FREE SERPL-MCNC: 1.39 NG/DL (ref 0.93–1.7)
TREPONEMA PALLIDUM IGG+IGM AB [PRESENCE] IN SERUM OR PLASMA BY IMMUNOASSAY: NORMAL
TSH SERPL DL<=0.005 MIU/L-ACNC: 2.8 UIU/ML (ref 0.38–5.33)
VIT B12 SERPL-MCNC: 566 PG/ML (ref 211–911)

## 2021-06-03 PROCEDURE — 86696 HERPES SIMPLEX TYPE 2 TEST: CPT

## 2021-06-03 PROCEDURE — 82306 VITAMIN D 25 HYDROXY: CPT

## 2021-06-03 PROCEDURE — 86803 HEPATITIS C AB TEST: CPT

## 2021-06-03 PROCEDURE — 82607 VITAMIN B-12: CPT

## 2021-06-03 PROCEDURE — 36415 COLL VENOUS BLD VENIPUNCTURE: CPT

## 2021-06-03 PROCEDURE — 80053 COMPREHEN METABOLIC PANEL: CPT

## 2021-06-03 PROCEDURE — 87389 HIV-1 AG W/HIV-1&-2 AB AG IA: CPT

## 2021-06-03 PROCEDURE — 86695 HERPES SIMPLEX TYPE 1 TEST: CPT

## 2021-06-03 PROCEDURE — 84443 ASSAY THYROID STIM HORMONE: CPT

## 2021-06-03 PROCEDURE — 84439 ASSAY OF FREE THYROXINE: CPT

## 2021-06-03 PROCEDURE — 86780 TREPONEMA PALLIDUM: CPT

## 2021-06-03 PROCEDURE — 86694 HERPES SIMPLEX NES ANTBDY: CPT

## 2021-06-03 ASSESSMENT — FIBROSIS 4 INDEX: FIB4 SCORE: 0.64

## 2021-06-05 LAB
25(OH)D3 SERPL-MCNC: 32 NG/ML (ref 30–80)
HSV1 GG IGG SER-ACNC: 21.7 IV
HSV1+2 IGG SER IA-ACNC: >22.4 IV
HSV2 GG IGG SER-ACNC: 0.07 IV

## 2021-06-14 RX ORDER — TURMERIC 95 %
1 POWDER (GRAM) MISCELLANEOUS DAILY
COMMUNITY
End: 2021-06-18

## 2021-06-14 RX ORDER — GARLIC 200 MG
300 TABLET ORAL
COMMUNITY
End: 2021-06-18

## 2021-06-18 ENCOUNTER — OFFICE VISIT (OUTPATIENT)
Dept: ENDOCRINOLOGY | Facility: MEDICAL CENTER | Age: 41
End: 2021-06-18
Attending: INTERNAL MEDICINE
Payer: COMMERCIAL

## 2021-06-18 VITALS
DIASTOLIC BLOOD PRESSURE: 70 MMHG | SYSTOLIC BLOOD PRESSURE: 108 MMHG | HEART RATE: 76 BPM | WEIGHT: 161 LBS | BODY MASS INDEX: 27.49 KG/M2 | OXYGEN SATURATION: 100 % | HEIGHT: 64 IN

## 2021-06-18 DIAGNOSIS — E03.8 SUBCLINICAL HYPOTHYROIDISM: ICD-10-CM

## 2021-06-18 DIAGNOSIS — E06.3 HASHIMOTO'S THYROIDITIS: ICD-10-CM

## 2021-06-18 DIAGNOSIS — E55.9 VITAMIN D DEFICIENCY: ICD-10-CM

## 2021-06-18 PROBLEM — Z85.3 PERSONAL HISTORY OF BREAST CANCER: Status: ACTIVE | Noted: 2020-11-19

## 2021-06-18 PROBLEM — Z90.13 ACQUIRED ABSENCE OF BILATERAL BREASTS AND NIPPLES: Status: ACTIVE | Noted: 2018-05-15

## 2021-06-18 PROCEDURE — 99214 OFFICE O/P EST MOD 30 MIN: CPT | Performed by: INTERNAL MEDICINE

## 2021-06-18 RX ORDER — LEVOTHYROXINE SODIUM 112 MCG
112 TABLET ORAL
Qty: 90 TABLET | Refills: 1 | Status: SHIPPED | OUTPATIENT
Start: 2021-06-18 | End: 2022-01-28 | Stop reason: SDUPTHER

## 2021-06-18 RX ORDER — NITROFURANTOIN 25; 75 MG/1; MG/1
CAPSULE ORAL
COMMUNITY
Start: 2021-06-02 | End: 2021-06-18

## 2021-06-18 RX ORDER — ESTRADIOL 0.1 MG/G
CREAM VAGINAL DAILY
COMMUNITY
Start: 2021-06-02

## 2021-06-18 ASSESSMENT — FIBROSIS 4 INDEX: FIB4 SCORE: 0.59

## 2021-06-18 NOTE — PROGRESS NOTES
Chief Complaint: Follow up for Primary Hypothyroidism secondary to Hashimoto's thyroiditis and history of nontoxic multinodular goiter.      HPI:     Pebbles Cosme is a 39 y.o. female here for follow up of Primary Hypothyroidism.      Comorbid issues include BRCA1 + status and triple negative breast cancer s/p bilateral mastectomy and TAHBSO      Since last visit patient reports feeling poor.  She remains on Synthroid 100 MCG daily which has been her dose for the past 3 months.   She  denies taking any iron, calcium supplements or antacids.  She admits to missing doses.    She reports feeling fatigue and lethargy      Most recent TSH is suboptimal at 2.8  Vitamin D is suboptimal at 32 on Karen 3, 2021        She also has a history of nontoxic multinodular goiter with last ultrasound completed in October 2019 with a dominant 1 cm hypoechoic solid nodule on the left mid lobe and subcentimeter nodule on the right lobe which appear to be low risk for malignancy per my read.  She denies a family history of thyroid cancer and denies radiation exposure.    Follow-up thyroid ultrasound completed on September 20, 2020 showed stable left mid lobe nodule with no evidence of significant change or growth.      She denies feeling any new lumps or swelling on her neck        Patient's medications, allergies, and social histories were reviewed and updated as appropriate.      ROS:     CONS:     No fever, no chills   EYES:     No diplopia, no blurry vision   CV:           No chest pain, no palpitations   PULM:     No SOB, no cough, no hemoptysis.   GI:            No nausea, no vomiting, no diarrhea, no constipation   ENDO:     No polyuria, no polydipsia, no heat intolerance, denies cold intolerance       Past Medical History:  Problem List:  2020-09: GERD (gastroesophageal reflux disease)  2020-09: Major depressive disorder  2020-03: Nontoxic multinodular goiter  2020-03: Hashimoto's thyroiditis  2020-01: Research study  patient  2020-01: [facility]On[facility] Billing  2019-02: Impaired fasting glucose  2019-02: Morbid obesity (HCC)  2019-02: Weight gain  2019-01: Subclinical hypothyroidism  2019-01: Class 3 severe obesity without serious comorbidity in adult   ([facility])  2019-01: Fatigue  2019-01: Vitamin D deficiency  2018-06: BRCA gene mutation positive in female  2018-06: Breast cancer ([facility])  2018-06: Infected breast tissue expander ([facility])  2018-03: Situational anxiety  2017-08: Generalized anxiety disorder with panic attacks  2015-06: H/O LEEP  2015-06: Family hx of ovarian malignancy  2015-06: Decreased energy  2015-06: Insomnia  2015-06: Anxiety      Past Surgical History:  Past Surgical History:   Procedure Laterality Date   • BREAST IMPLANT REMOVAL Right 6/18/2018    Procedure: BREAST EXPANDER  REMOVAL;  Surgeon: Niki Osuna M.D.;  Location: SURGERY [facility];  Service: Plastics   • FLAP GRAFT Right 5/16/2018    Procedure: FLAP GRAFT-   ADJACENT TISSUE TRANSFER OR REARRANGEMENT, TRUNK;  Surgeon: Niki Osuna M.D.;  Location: SURGERY SAME DAY [facility];  Service: Plastics   • OTHER  04/2018    bilateral mastectomy   • OTHER  03/2018    lymph node dissection   • OTHER  10/11/2017    port placement   • ABDOMINAL HYSTERECTOMY TOTAL     • OPEN REDUCTION      R foot   • OTHER      wisdom teeth removed        Allergies:  Patient has no known allergies.     Social History:  Social History     Tobacco Use   • Smoking status: Light Tobacco Smoker     Packs/day: 0.03     Years: 0.00     Pack years: 0.00     Types: Cigarettes   • Smokeless tobacco: Never Used   Vaping Use   • Vaping Use: Never used   Substance Use Topics   • Alcohol use: Yes     Comment: ocassional   • Drug use: No        Family History:   family history includes Cancer (age of onset: 45) in her mother; Heart Disease in her father; Hyperlipidemia in her father; Hypertension in her father.      PHYSICAL EXAM:   Vital signs: /70   Pulse 76    "Ht 1.626 m (5' 4\")   Wt 73 kg (161 lb)   LMP 11/01/2017 (Approximate) Comment: last dose chemo 3/12/18.  SpO2 100%   BMI 27.64 kg/m²   GENERAL: Well-developed, well-nourished in no apparent distress.   EYE:  No ocular asymmetry, PERRLA  HENT: Pink, moist mucous membranes.    NECK: Thyroid is slightly enlarged and feels bosselated  CARDIOVASCULAR:  No murmurs  LUNGS: Clear breath sounds  ABDOMEN: Soft, nontender   EXTREMITIES: No clubbing, cyanosis, or edema.   NEUROLOGICAL: No gross focal motor abnormalities   LYMPH: No cervical adenopathy seen  SKIN: No rashes, lesions.       Labs:  Lab Results   Component Value Date/Time    SODIUM 140 06/03/2021 07:57 AM    POTASSIUM 3.6 06/03/2021 07:57 AM    CHLORIDE 107 06/03/2021 07:57 AM    CO2 24 06/03/2021 07:57 AM    ANION 9.0 06/03/2021 07:57 AM    GLUCOSE 91 06/03/2021 07:57 AM    BUN 14 06/03/2021 07:57 AM    CREATININE 0.78 06/03/2021 07:57 AM    CALCIUM 8.9 06/03/2021 07:57 AM    ASTSGOT 15 06/03/2021 07:57 AM    ALTSGPT 15 06/03/2021 07:57 AM    TBILIRUBIN 0.3 06/03/2021 07:57 AM    ALBUMIN 3.9 06/03/2021 07:57 AM    TOTPROTEIN 6.7 06/03/2021 07:57 AM    GLOBULIN 2.8 06/03/2021 07:57 AM    AGRATIO 1.4 06/03/2021 07:57 AM       Lab Results   Component Value Date/Time    SODIUM 134 (L) 02/24/2020 1117    POTASSIUM 3.7 02/24/2020 1117    CHLORIDE 101 02/24/2020 1117    CO2 23 02/24/2020 1117    GLUCOSE 110 (H) 02/24/2020 1117    BUN 16 02/24/2020 1117    CREATININE 0.85 02/24/2020 1117    CALCIUM 8.8 02/24/2020 1117    ANION 10.0 02/24/2020 1117       Lab Results   Component Value Date/Time    CHOLSTRLTOT 150 01/06/2017 1142    TRIGLYCERIDE 35 01/06/2017 1142    HDL 73 01/06/2017 1142    LDL 70 01/06/2017 1142       Lab Results   Component Value Date/Time    TSHULTRASEN 2.980 02/24/2020 1117     Lab Results   Component Value Date/Time    FREET4 1.03 12/03/2019 1120     Lab Results   Component Value Date/Time    FREET3 3.48 12/03/2019 1120     No results found for: " THYSTIMIG    Lab Results   Component Value Date/Time    MICROSOMALA 15 08/13/2019 1216         Imaging: See ultrasound from September 2020 discussed and reviewed with the patient      ASSESSMENT/PLAN:     1. Subclinical hypothyroidism  Stable  Adjust Synthroid to 112 mcg daily  Reviewed importance of adherence  Follow-up in 3 months with repeat of TSH    2. Hashimoto's thyroiditis  This is the etiology of her hypothyroidism    3. Nontoxic multinodular goiter  Stable  I reviewed the ultrasound images with the patient in detail  She has a 1 cm dominant hypoechoic wider than tall solid nodule on the left mid lobe with no suspicious microcalcifications  I am scheduling her for repeat ultrasound    4. Vitamin D deficiency  Controlled  Continue current supplements  Repeat vitamin D in 3 months      Return in about 4 months (around 10/18/2021).      Thank you kindly for allowing me to participate in the thyroid care plan for this patient.    Seth Benavides MD, TRICIA, [facility]  03/17/20    CC:   Pcp Pt States None

## 2021-07-01 ENCOUNTER — OFFICE VISIT (OUTPATIENT)
Dept: URGENT CARE | Facility: PHYSICIAN GROUP | Age: 41
End: 2021-07-01
Payer: COMMERCIAL

## 2021-07-01 VITALS
SYSTOLIC BLOOD PRESSURE: 122 MMHG | DIASTOLIC BLOOD PRESSURE: 78 MMHG | TEMPERATURE: 97.5 F | BODY MASS INDEX: 27.46 KG/M2 | HEART RATE: 73 BPM | RESPIRATION RATE: 16 BRPM | WEIGHT: 164.8 LBS | OXYGEN SATURATION: 98 % | HEIGHT: 65 IN

## 2021-07-01 DIAGNOSIS — Z02.89 ENCOUNTER FOR EXAMINATION REQUIRED BY DEPARTMENT OF TRANSPORTATION (DOT): ICD-10-CM

## 2021-07-01 PROCEDURE — 7100 PR DOT PHYSICAL: Performed by: NURSE PRACTITIONER

## 2021-07-01 ASSESSMENT — FIBROSIS 4 INDEX: FIB4 SCORE: 0.59

## 2021-07-01 NOTE — PROGRESS NOTES
Subjective:     Pebbles Cosme is a 41 y.o. female who presents for Annual Exam (DOT Exam)           Annual Exam        Past Medical History:   Diagnosis Date   • Anxiety    • Cancer (HCC) 11/2017    breast cancer   • Pain     edwar drains in place bilateral chest   • Psychiatric problem     anxiety       Past Surgical History:   Procedure Laterality Date   • BREAST IMPLANT REMOVAL Right 6/18/2018    Procedure: BREAST EXPANDER  REMOVAL;  Surgeon: Niki Osuna M.D.;  Location: SURGERY [facility];  Service: Plastics   • FLAP GRAFT Right 5/16/2018    Procedure: FLAP GRAFT-   ADJACENT TISSUE TRANSFER OR REARRANGEMENT, TRUNK;  Surgeon: Niki Osuna M.D.;  Location: SURGERY SAME DAY [facility];  Service: Plastics   • OTHER  04/2018    bilateral mastectomy   • OTHER  03/2018    lymph node dissection   • OTHER  10/11/2017    port placement   • ABDOMINAL HYSTERECTOMY TOTAL     • OPEN REDUCTION      R foot   • OTHER      wisdom teeth removed       Social History     Socioeconomic History   • Marital status: Single     Spouse name: Not on file   • Number of children: Not on file   • Years of education: Not on file   • Highest education level: Some college, no degree   Occupational History   • Not on file   Tobacco Use   • Smoking status: Light Tobacco Smoker     Packs/day: 0.03     Years: 0.00     Pack years: 0.00     Types: Cigarettes   • Smokeless tobacco: Never Used   Vaping Use   • Vaping Use: Never used   Substance and Sexual Activity   • Alcohol use: Yes     Comment: ocassional   • Drug use: No   • Sexual activity: Not Currently     Partners: Male     Birth control/protection: Condom   Other Topics Concern   • Not on file   Social History Narrative   • Not on file     Social Determinants of Health     Financial Resource Strain: Low Risk    • Difficulty of Paying Living Expenses: Not very hard   Food Insecurity: No Food Insecurity   • Worried About Running Out of Food in the Last Year: Never true  "  • Ran Out of Food in the Last Year: Never true   Transportation Needs: No Transportation Needs   • Lack of Transportation (Medical): No   • Lack of Transportation (Non-Medical): No   Physical Activity: Sufficiently Active   • Days of Exercise per Week: 5 days   • Minutes of Exercise per Session: 50 min   Stress: Stress Concern Present   • Feeling of Stress : Rather much   Social Connections: Socially Isolated   • Frequency of Communication with Friends and Family: Three times a week   • Frequency of Social Gatherings with Friends and Family: Once a week   • Attends Zoroastrian Services: Never   • Active Member of Clubs or Organizations: No   • Attends Club or Organization Meetings: Never   • Marital Status: Never    Intimate Partner Violence:    • Fear of Current or Ex-Partner:    • Emotionally Abused:    • Physically Abused:    • Sexually Abused:         Family History   Problem Relation Age of Onset   • Cancer Mother 45        ovarian ca   • Hypertension Father    • Heart Disease Father    • Hyperlipidemia Father         No Known Allergies    ROS     Objective:   /78   Pulse 73   Temp 36.4 °C (97.5 °F) (Temporal)   Resp 16   Ht 1.638 m (5' 4.5\")   Wt 74.8 kg (164 lb 12.8 oz)   LMP 11/01/2017 (Approximate) Comment: last dose chemo 3/12/18.  SpO2 98%   BMI 27.85 kg/m²     Physical Exam    Assessment/Plan:   There are no diagnoses linked to this encounter.  Corrected    Differential diagnosis, natural history, supportive care, and indications for immediate follow-up discussed.  "

## 2021-07-02 NOTE — PROGRESS NOTES
Subjective:     Pebbles Cosme is a 41 y.o. female who presents for Annual Exam (DOT Exam)      Returns for DOT exam. Was previously asked to obtain note from provider regarding Alprazolam. Has taken alprazolam prn for insomnia for the last 3-4 years.     Annual Exam        Past Medical History:   Diagnosis Date   • Anxiety    • Cancer (HCC) 11/2017    breast cancer   • Pain     edwar drains in place bilateral chest   • Psychiatric problem     anxiety       Past Surgical History:   Procedure Laterality Date   • BREAST IMPLANT REMOVAL Right 6/18/2018    Procedure: BREAST EXPANDER  REMOVAL;  Surgeon: Niki Osuna M.D.;  Location: SURGERY [facility];  Service: Plastics   • FLAP GRAFT Right 5/16/2018    Procedure: FLAP GRAFT-   ADJACENT TISSUE TRANSFER OR REARRANGEMENT, TRUNK;  Surgeon: Niki Osuna M.D.;  Location: SURGERY SAME DAY [facility];  Service: Plastics   • OTHER  04/2018    bilateral mastectomy   • OTHER  03/2018    lymph node dissection   • OTHER  10/11/2017    port placement   • ABDOMINAL HYSTERECTOMY TOTAL     • OPEN REDUCTION      R foot   • OTHER      wisdom teeth removed       Social History     Socioeconomic History   • Marital status: Single     Spouse name: Not on file   • Number of children: Not on file   • Years of education: Not on file   • Highest education level: Some college, no degree   Occupational History   • Not on file   Tobacco Use   • Smoking status: Light Tobacco Smoker     Packs/day: 0.03     Years: 0.00     Pack years: 0.00     Types: Cigarettes   • Smokeless tobacco: Never Used   Vaping Use   • Vaping Use: Never used   Substance and Sexual Activity   • Alcohol use: Yes     Comment: ocassional   • Drug use: No   • Sexual activity: Not Currently     Partners: Male     Birth control/protection: Condom   Other Topics Concern   • Not on file   Social History Narrative   • Not on file     Social Determinants of Health     Financial Resource Strain: Low Risk    •  "Difficulty of Paying Living Expenses: Not very hard   Food Insecurity: No Food Insecurity   • Worried About Running Out of Food in the Last Year: Never true   • Ran Out of Food in the Last Year: Never true   Transportation Needs: No Transportation Needs   • Lack of Transportation (Medical): No   • Lack of Transportation (Non-Medical): No   Physical Activity: Sufficiently Active   • Days of Exercise per Week: 5 days   • Minutes of Exercise per Session: 50 min   Stress: Stress Concern Present   • Feeling of Stress : Rather much   Social Connections: Socially Isolated   • Frequency of Communication with Friends and Family: Three times a week   • Frequency of Social Gatherings with Friends and Family: Once a week   • Attends Alevism Services: Never   • Active Member of Clubs or Organizations: No   • Attends Club or Organization Meetings: Never   • Marital Status: Never    Intimate Partner Violence:    • Fear of Current or Ex-Partner:    • Emotionally Abused:    • Physically Abused:    • Sexually Abused:         Family History   Problem Relation Age of Onset   • Cancer Mother 45        ovarian ca   • Hypertension Father    • Heart Disease Father    • Hyperlipidemia Father         No Known Allergies    Review of Systems   Psychiatric/Behavioral: Positive for depression. Negative for suicidal ideas. The patient has insomnia.    All other systems reviewed and are negative.       Objective:   /78   Pulse 73   Temp 36.4 °C (97.5 °F) (Temporal)   Resp 16   Ht 1.638 m (5' 4.5\")   Wt 74.8 kg (164 lb 12.8 oz)   LMP 11/01/2017 (Approximate) Comment: last dose chemo 3/12/18.  SpO2 98%   BMI 27.85 kg/m²     Physical Exam  Vitals reviewed.   Constitutional:       General: She is not in acute distress.     Appearance: She is well-developed.   HENT:      Head: Normocephalic and atraumatic.      Right Ear: Tympanic membrane, ear canal and external ear normal.      Left Ear: Tympanic membrane, ear canal and " external ear normal.      Nose: Nose normal.      Mouth/Throat:      Mouth: Mucous membranes are moist.   Eyes:      Extraocular Movements: Extraocular movements intact.      Conjunctiva/sclera: Conjunctivae normal.      Pupils: Pupils are equal, round, and reactive to light.   Cardiovascular:      Rate and Rhythm: Normal rate and regular rhythm.   Pulmonary:      Effort: Pulmonary effort is normal.      Breath sounds: Normal breath sounds.   Musculoskeletal:         General: Normal range of motion.      Cervical back: Normal range of motion and neck supple.   Skin:     General: Skin is warm and dry.      Findings: No rash.   Neurological:      General: No focal deficit present.      Mental Status: She is alert and oriented to person, place, and time.      GCS: GCS eye subscore is 4. GCS verbal subscore is 5. GCS motor subscore is 6.   Psychiatric:         Mood and Affect: Mood normal.         Speech: Speech normal.         Behavior: Behavior normal.         Thought Content: Thought content normal.         Judgment: Judgment normal.         Assessment/Plan:   1. 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 See scanned documentation for physical and health questionnaire. Denies SOB, CP or dizziness on exertion. Denies h/o respiratory or cardiac pathology, or seizures.    No hx of SI, tolerates wellbutrin well. No sedative effects. See scanned letter pertaining to alprazolam prescribed for insomnia. 1 year certification.     Differential diagnosis, natural history, supportive care, and indications for immediate follow-up discussed.

## 2021-07-07 ASSESSMENT — ENCOUNTER SYMPTOMS
DEPRESSION: 1
INSOMNIA: 1

## 2021-07-08 LAB
APPEARANCE UR: NORMAL
BILIRUB UR STRIP-MCNC: NORMAL MG/DL
COLOR UR AUTO: YELLOW
GLUCOSE UR STRIP.AUTO-MCNC: NORMAL MG/DL
KETONES UR STRIP.AUTO-MCNC: NORMAL MG/DL
LEUKOCYTE ESTERASE UR QL STRIP.AUTO: NORMAL
NITRITE UR QL STRIP.AUTO: NORMAL
PH UR STRIP.AUTO: 7.5 [PH] (ref 5–8)
PROT UR QL STRIP: NORMAL MG/DL
RBC UR QL AUTO: NORMAL
SP GR UR STRIP.AUTO: 1.01
UROBILINOGEN UR STRIP-MCNC: 0.2 MG/DL

## 2021-07-16 ENCOUNTER — OFFICE VISIT (OUTPATIENT)
Dept: MEDICAL GROUP | Facility: MEDICAL CENTER | Age: 41
End: 2021-07-16
Payer: COMMERCIAL

## 2021-07-16 VITALS
BODY MASS INDEX: 28.04 KG/M2 | HEART RATE: 80 BPM | SYSTOLIC BLOOD PRESSURE: 126 MMHG | TEMPERATURE: 99.3 F | OXYGEN SATURATION: 98 % | HEIGHT: 64 IN | RESPIRATION RATE: 18 BRPM | WEIGHT: 164.24 LBS | DIASTOLIC BLOOD PRESSURE: 88 MMHG

## 2021-07-16 DIAGNOSIS — F41.9 ANXIETY: ICD-10-CM

## 2021-07-16 DIAGNOSIS — K21.9 GASTROESOPHAGEAL REFLUX DISEASE, UNSPECIFIED WHETHER ESOPHAGITIS PRESENT: ICD-10-CM

## 2021-07-16 DIAGNOSIS — E55.9 VITAMIN D DEFICIENCY: ICD-10-CM

## 2021-07-16 DIAGNOSIS — E04.2 NONTOXIC MULTINODULAR GOITER: ICD-10-CM

## 2021-07-16 DIAGNOSIS — E06.3 HASHIMOTO'S THYROIDITIS: ICD-10-CM

## 2021-07-16 DIAGNOSIS — C50.912 MALIGNANT NEOPLASM OF LEFT FEMALE BREAST, UNSPECIFIED ESTROGEN RECEPTOR STATUS, UNSPECIFIED SITE OF BREAST (HCC): ICD-10-CM

## 2021-07-16 PROBLEM — R63.5 WEIGHT GAIN: Status: RESOLVED | Noted: 2019-02-13 | Resolved: 2021-07-16

## 2021-07-16 PROBLEM — R53.83 FATIGUE: Status: RESOLVED | Noted: 2019-01-24 | Resolved: 2021-07-16

## 2021-07-16 PROBLEM — E03.8 SUBCLINICAL HYPOTHYROIDISM: Status: RESOLVED | Noted: 2019-01-24 | Resolved: 2021-07-16

## 2021-07-16 PROBLEM — F33.42 RECURRENT MAJOR DEPRESSIVE DISORDER, IN FULL REMISSION (HCC): Status: ACTIVE | Noted: 2020-09-29

## 2021-07-16 PROBLEM — R73.01 IMPAIRED FASTING GLUCOSE: Status: RESOLVED | Noted: 2019-02-13 | Resolved: 2021-07-16

## 2021-07-16 PROBLEM — E66.01 MORBID OBESITY (HCC): Status: RESOLVED | Noted: 2019-02-13 | Resolved: 2021-07-16

## 2021-07-16 PROCEDURE — 99214 OFFICE O/P EST MOD 30 MIN: CPT | Performed by: FAMILY MEDICINE

## 2021-07-16 ASSESSMENT — FIBROSIS 4 INDEX: FIB4 SCORE: 0.59

## 2021-07-16 NOTE — ASSESSMENT & PLAN NOTE
The patient follows with psychiatry, Dr. Candy Zuniga.  She is on Wellbutrin XL for 450 mg q. morning, Xanax 0.25 mg as needed. She reports her symptoms are fairly well-controlled on this regimen.

## 2021-07-16 NOTE — PROGRESS NOTES
Subjective:     CC: Follow up chronic conditions    HPI:   Pebbles presents today with:    Breast cancer (HCC)  The patient reports she was diagnosed with triple negative breast cancer 9/2017.  Lump initially identified in left breast.  She is BRCA1 positive.  Patient was treated with double mastectomy, chemo, radiation, and most recently immunotherapy which she finished 2/2020.  The patient was following with Dr. Whitehead, she will be establishing with Dr. Rivera in [place]. She had a consultation at [facility] 3/2021, PET scan completed at [facility] (reportedly normal, will request records).     Double mastectomy completed at [facility] 4/2018, reconstructions completed at [facility] in [place].    Patient is s/p total hysterectomy completed about a year ago by Dr. Woods. She is now on estradiol 1mg daily.        Hashimoto's thyroiditis  This is a chronic condition for which the patient follows with endocrinology, Dr. Benavides. She is now on synthroid 112mcg q morning. Patient has an appointment with Dr. Benavides 11/5/2021.     Anxiety  The patient follows with psychiatry, Dr. Candy Zuniga.  She is on Wellbutrin XL for 450 mg q. morning, Xanax 0.25 mg as needed. She reports her symptoms are fairly well-controlled on this regimen.    GERD (gastroesophageal reflux disease)  This is a chronic problem.  The patient reports severe reflux while on chemotherapy.  She reports her symptoms are improving.  She continues to work on tapering her omeprazole.  She was previously on omeprazole 40 mg daily and has tapered to 20 mg daily. She has never had an EGD. Patient denies dysphagia, odynophagia, globus, choking, melena, hematochezia.        Past Medical History:   Diagnosis Date   • Anxiety    • Cancer (HCC) 11/2017    breast cancer   • Pain     edwar drains in place bilateral chest   • Psychiatric problem     anxiety       Social History     Tobacco Use   • Smoking status: Light Tobacco Smoker  "    Packs/day: 0.03     Years: 0.00     Pack years: 0.00     Types: Cigarettes   • Smokeless tobacco: Never Used   • Tobacco comment: rare/ once a week    Vaping Use   • Vaping Use: Never used   Substance Use Topics   • Alcohol use: Yes     Comment: ocassional   • Drug use: No       Current Outpatient Medications Ordered in Epic   Medication Sig Dispense Refill   • estradiol (ESTRACE) 0.1 MG/GM vaginal cream      • SYNTHROID 112 MCG Tab Take 1 tablet by mouth every morning on an empty stomach. 90 tablet 1   • omeprazole (PRILOSEC) 20 MG delayed-release capsule TAKE 1 CAPSULE BY MOUTH EVERY DAY 90 capsule 1   • buPROPion (WELLBUTRIN XL) 150 MG XL tablet TK 1 T PO QD WITH 300 MG T     • ALPRAZolam (XANAX) 0.25 MG Tab      • buPROPion (WELLBUTRIN XL) 300 MG XL tablet      • estradiol (ESTRACE) 1 MG Tab Take  by mouth every day. Take 1 tablet by mouth every day     • amphetamine-dextroamphetamine (ADDERALL) 10 MG Tab 10 mg 2 times a day.     • Multiple Vitamins-Minerals (MULTIVITAMIN ADULT PO) Take 1 Tab by mouth every day.     • Cholecalciferol 4000 units Cap Take 1 capsule by mouth every day.     • Ascorbic Acid (VITAMIN C) Powder Take 300 g by mouth.     • Melatonin 10 MG Cap Take 20 mg by mouth.     • TURMERIC CURCUMIN PO Take 1 Tab by mouth every day.       No current [facility]-ordered facility-administered medications on file.       Allergies:  Patient has no known allergies.    Health Maintenance: COVID19 vaccination UTD    ROS:  Gen: no fevers/chills  Eyes: no changes in vision  ENT: no sore throat, no hearing loss, no bloody nose  Pulm: no SOB  CV: no chest pain    Objective:       Exam:  /88 (BP Location: Left arm, Patient Position: Sitting, BP Cuff Size: Adult long)   Pulse 80   Temp 37.4 °C (99.3 °F) (Temporal)   Resp 18   Ht 1.626 m (5' 4\")   Wt 74.5 kg (164 lb 3.9 oz)   LMP 11/01/2017 (Approximate) Comment: last dose chemo 3/12/18.  SpO2 98%   BMI 28.19 kg/m²  Body mass index is 28.19 " kg/m².    Gen: Alert and oriented, No apparent distress  Lungs: Normal effort, CTA bilaterally, no wheezes, rhonchi, or rales  CV: Regular rate and rhythm, no murmurs, rubs, or gallops      Assessment & Plan:     41 y.o. female with the following -     1. Malignant neoplasm of left female breast, unspecified estrogen receptor status, unspecified site of breast (HCC)  - Patient will be establishing with new oncologist per HPI  - Requesting records PET scan [facility]    2. Hashimoto's thyroiditis  - Continue synthroid 112mcg q morning per Dr. Benavides  - Comp Metabolic Panel; Future  - Lipid Profile; Future  - HEMOGLOBIN A1C; Future  - CBC WITH DIFFERENTIAL; Future  - TSH; Future  - FREE THYROXINE; Future    3. Vitamin D deficiency  - VITAMIN D,25 HYDROXY; Future    4. Nontoxic multinodular goiter  - US ordered by Dr. Benavides     5. Anxiety  - Stable, continue current regimen per psychiatry    6. Gastroesophageal reflux disease, unspecified whether esophagitis present  - Stable, patient continuing to wean PPI; discussed alternating with H2 blocker    Return in about 3 months (around 10/16/2021) for F/U lab.    Please note this dictation was created using voice recognition software. I have made every reasonable attempt to correct obvious errors, but I expect there may be errors of grammar, and possibly content, that I did not discover before finalizing the note.

## 2021-07-16 NOTE — ASSESSMENT & PLAN NOTE
This is a chronic problem.  The patient reports severe reflux while on chemotherapy.  She reports her symptoms are improving.  She continues to work on tapering her omeprazole.  She was previously on omeprazole 40 mg daily and has tapered to 20 mg daily. She has never had an EGD. Patient denies dysphagia, odynophagia, globus, choking, melena, hematochezia.

## 2021-07-16 NOTE — ASSESSMENT & PLAN NOTE
This is a chronic condition for which the patient follows with endocrinology, Dr. Benavides. She is now on synthroid 112mcg q morning. Patient has an appointment with Dr. Benavides 11/5/2021.

## 2021-07-16 NOTE — LETTER
[facility]  Simona Mccracken M.D.  [address]  [place] [address]  Fax: [phone]   Authorization for Release/Disclosure of   Protected Health Information   Name: PASTORA AYERS : 1980 SSN: [id number]   Address: [address] [place] [address] [address] [place] [address] Phone:    [phone] (home)    I authorize the entity listed below to release/disclose the PHI below to:   [facility]/Simona Mccracken M.D. and Simona Mccracken M.D.   Provider or Entity Name:[facility] Address City, State, Zip   Phone:      Fax:[phone]     Reason for request: continuity of care   Information to be released:    [  ] LAST COLONOSCOPY,  including any PATH REPORT and follow-up  [  ] LAST FIT/COLOGUARD RESULT [  ] LAST DEXA  [  ] LAST MAMMOGRAM  [  ] LAST PAP  [  ] LAST LABS [  ] RETINA EXAM REPORT  [  ] IMMUNIZATION RECORDS  [  ] PET Scan Done in January      [  ] Check here and initial the line next to each item to release ALL health information INCLUDING  _____ Care and treatment for drug and / or alcohol abuse  _____ HIV testing, infection status, or AIDS  _____ Genetic Testing    DATES OF SERVICE OR TIME PERIOD TO BE DISCLOSED: _____________  I understand and acknowledge that:  * This Authorization may be revoked at any time by you in writing, except if your health information has already been used or disclosed.  * Your health information that will be used or disclosed as a result of you signing this authorization could be re-disclosed by the recipient. If this occurs, your re-disclosed health information may no longer be protected by State or Federal laws.  * You may refuse to sign this Authorization. Your refusal will not affect your ability to obtain treatment.  * This Authorization becomes effective upon signing and will  on (date) __________.      If no date is indicated, this Authorization will  one (1) year from the signature date.    Name: Pastora Vo  Ba    Signature:Continuation of Care   Date:     7/16/2021       PLEASE FAX REQUESTED RECORDS BACK TO: [phone]

## 2021-07-16 NOTE — ASSESSMENT & PLAN NOTE
The patient reports she was diagnosed with triple negative breast cancer 9/2017.  Lump initially identified in left breast.  She is BRCA1 positive.  Patient was treated with double mastectomy, chemo, radiation, and most recently immunotherapy which she finished 2/2020.  The patient was following with Dr. Whitehead, she will be establishing with Dr. Rivera in [place]. She had a consultation at [facility] 3/2021, PET scan completed at [facility] (reportedly normal, will request records).     Double mastectomy completed at [facility] 4/2018, reconstructions completed at [facility] in [place].    Patient is s/p total hysterectomy completed about a year ago by Dr. Woods. She is now on estradiol 1mg daily.

## 2021-09-28 RX ORDER — OMEPRAZOLE 20 MG/1
CAPSULE, DELAYED RELEASE ORAL
Qty: 90 CAPSULE | Refills: 1 | Status: SHIPPED | OUTPATIENT
Start: 2021-09-28 | End: 2022-04-12 | Stop reason: SDUPTHER

## 2021-10-15 ENCOUNTER — OFFICE VISIT (OUTPATIENT)
Dept: MEDICAL GROUP | Facility: MEDICAL CENTER | Age: 41
End: 2021-10-15
Payer: COMMERCIAL

## 2021-10-15 VITALS
BODY MASS INDEX: 28.38 KG/M2 | HEIGHT: 64 IN | DIASTOLIC BLOOD PRESSURE: 84 MMHG | RESPIRATION RATE: 20 BRPM | HEART RATE: 71 BPM | OXYGEN SATURATION: 100 % | WEIGHT: 166.2 LBS | TEMPERATURE: 98.9 F | SYSTOLIC BLOOD PRESSURE: 136 MMHG

## 2021-10-15 DIAGNOSIS — Z23 NEED FOR VACCINATION: ICD-10-CM

## 2021-10-15 DIAGNOSIS — C50.912 MALIGNANT NEOPLASM OF LEFT FEMALE BREAST, UNSPECIFIED ESTROGEN RECEPTOR STATUS, UNSPECIFIED SITE OF BREAST (HCC): ICD-10-CM

## 2021-10-15 DIAGNOSIS — K21.9 GASTROESOPHAGEAL REFLUX DISEASE, UNSPECIFIED WHETHER ESOPHAGITIS PRESENT: ICD-10-CM

## 2021-10-15 DIAGNOSIS — F41.9 ANXIETY: ICD-10-CM

## 2021-10-15 PROCEDURE — 99214 OFFICE O/P EST MOD 30 MIN: CPT | Mod: 25 | Performed by: FAMILY MEDICINE

## 2021-10-15 PROCEDURE — 90471 IMMUNIZATION ADMIN: CPT | Performed by: FAMILY MEDICINE

## 2021-10-15 PROCEDURE — 90732 PPSV23 VACC 2 YRS+ SUBQ/IM: CPT | Performed by: FAMILY MEDICINE

## 2021-10-15 PROCEDURE — 90686 IIV4 VACC NO PRSV 0.5 ML IM: CPT | Performed by: FAMILY MEDICINE

## 2021-10-15 PROCEDURE — 90472 IMMUNIZATION ADMIN EACH ADD: CPT | Performed by: FAMILY MEDICINE

## 2021-10-15 ASSESSMENT — PATIENT HEALTH QUESTIONNAIRE - PHQ9
SUM OF ALL RESPONSES TO PHQ9 QUESTIONS 1 AND 2: 0
1. LITTLE INTEREST OR PLEASURE IN DOING THINGS: NOT AT ALL
7. TROUBLE CONCENTRATING ON THINGS, SUCH AS READING THE NEWSPAPER OR WATCHING TELEVISION: NOT AT ALL
9. THOUGHTS THAT YOU WOULD BE BETTER OFF DEAD, OR OF HURTING YOURSELF: NOT AT ALL
2. FEELING DOWN, DEPRESSED, IRRITABLE, OR HOPELESS: NOT AT ALL
5. POOR APPETITE OR OVEREATING: NOT AT ALL
3. TROUBLE FALLING OR STAYING ASLEEP OR SLEEPING TOO MUCH: NOT AT ALL
4. FEELING TIRED OR HAVING LITTLE ENERGY: NOT AT ALL
6. FEELING BAD ABOUT YOURSELF - OR THAT YOU ARE A FAILURE OR HAVE LET YOURSELF OR YOUR FAMILY DOWN: NOT AL ALL
8. MOVING OR SPEAKING SO SLOWLY THAT OTHER PEOPLE COULD HAVE NOTICED. OR THE OPPOSITE, BEING SO FIGETY OR RESTLESS THAT YOU HAVE BEEN MOVING AROUND A LOT MORE THAN USUAL: NOT AT ALL
SUM OF ALL RESPONSES TO PHQ QUESTIONS 1-9: 0

## 2021-10-15 ASSESSMENT — FIBROSIS 4 INDEX: FIB4 SCORE: 0.59

## 2021-10-15 NOTE — ASSESSMENT & PLAN NOTE
This is a chronic problem.  The patient reports severe reflux while on chemotherapy.  She reports her symptoms were improving until she started eating trigger foods. She continues to work on tapering her omeprazole.  She was previously on omeprazole 40 mg daily and has tapered to 20 mg daily. She has never had an EGD. Patient denies dysphagia, odynophagia, globus, choking, melena, hematochezia.

## 2021-10-15 NOTE — PROGRESS NOTES
Subjective:     CC: follow up chronic conditions    HPI:   Pebbles presents today with:    Breast cancer (HCC)  The patient reports she was diagnosed with triple negative breast cancer 9/2017.  Lump initially identified in left breast.  She is BRCA1 positive.  Patient was treated with double mastectomy, chemo, radiation, and most recently immunotherapy which she finished 2/2020.  The patient was following with Dr. Whitehead, she is now established with Dr. Rivera in [place]. She had a consultation at [place] 3/2021, PET scan completed at [facility].    Double mastectomy completed at [facility] 4/2018, reconstructions completed at [place] [facility] in [place].    Patient is s/p total hysterectomy completed by Dr. Woods. She is now on estradiol 1mg daily.     She has an appointment with Dr. Rivera next week. Pebbles reports she continues to have mild-mod persistent fatigue. Echocardiogram scheduled to rule out cardiac etiology 2/2 chemo.        Anxiety  The patient follows with psychiatry, Dr. Candy Zuniga.  She is on Wellbutrin XL for 450 mg q. morning, Xanax 0.25 mg as needed. She reports her symptoms are fairly well-controlled on this regimen however notes more anxiety recently. She is not currently established with a psychologist/counselor. She also notes she was previously exercising regularly and is not currently getting regular exercise.    GERD (gastroesophageal reflux disease)  This is a chronic problem.  The patient reports severe reflux while on chemotherapy.  She reports her symptoms were improving until she started eating trigger foods. She continues to work on tapering her omeprazole.  She was previously on omeprazole 40 mg daily and has tapered to 20 mg daily. She has never had an EGD. Patient denies dysphagia, odynophagia, globus, choking, melena, hematochezia.        Past Medical History:   Diagnosis Date   • Anxiety    • Cancer (HCC) 11/2017    breast cancer   • Pain     edwar  drains in place bilateral chest   • Psychiatric problem     anxiety       Social History     Tobacco Use   • Smoking status: Light Tobacco Smoker     Packs/day: 0.03     Years: 0.00     Pack years: 0.00     Types: Cigarettes   • Smokeless tobacco: Never Used   • Tobacco comment: rare/ once a week    Vaping Use   • Vaping Use: Never used   Substance Use Topics   • Alcohol use: Yes     Comment: ocassional   • Drug use: No       Current Outpatient Medications Ordered in Epic   Medication Sig Dispense Refill   • omeprazole (PRILOSEC) 20 MG delayed-release capsule TAKE 1 CAPSULE BY MOUTH EVERY DAY 90 Capsule 1   • estradiol (ESTRACE) 0.1 MG/GM vaginal cream      • SYNTHROID 112 MCG Tab Take 1 tablet by mouth every morning on an empty stomach. 90 tablet 1   • buPROPion (WELLBUTRIN XL) 150 MG XL tablet TK 1 T PO QD WITH 300 MG T     • ALPRAZolam (XANAX) 0.25 MG Tab      • buPROPion (WELLBUTRIN XL) 300 MG XL tablet      • estradiol (ESTRACE) 1 MG Tab Take  by mouth every day. Take 1 tablet by mouth every day     • amphetamine-dextroamphetamine (ADDERALL) 10 MG Tab 10 mg 2 times a day.     • Multiple Vitamins-Minerals (MULTIVITAMIN ADULT PO) Take 1 Tab by mouth every day.     • Cholecalciferol 4000 units Cap Take 1 capsule by mouth every day.     • Ascorbic Acid (VITAMIN C) Powder Take 300 g by mouth.     • Melatonin 10 MG Cap Take 20 mg by mouth.     • TURMERIC CURCUMIN PO Take 1 Tab by mouth every day.       No current [facility]-ordered facility-administered medications on file.       Allergies:  Patient has no known allergies.    Health Maintenance: Patient interested in COVID19 vaccination; recommended she get at a pharmacy or health department as soon as possible    ROS:  Gen: no fevers/chills, no changes in weight  Eyes: no changes in vision  ENT: no sore throat, no hearing loss, no bloody nose  Pulm: no SOB  CV: no chest pain      Objective:       Exam:  /84   Pulse 71   Temp 37.2 °C (98.9 °F) (Temporal)   Resp 20  "  Ht 1.626 m (5' 4\")   Wt 75.4 kg (166 lb 3.2 oz)   LMP 11/01/2017 (Approximate) Comment: last dose chemo 3/12/18.  SpO2 100%   BMI 28.53 kg/m²  Body mass index is 28.53 kg/m².    Gen: Alert and oriented, No apparent distress  Lungs: Normal effort, CTA bilaterally, no wheezes, rhonchi, or rales  CV: Regular rate and rhythm, no murmurs, rubs, or gallops      Assessment & Plan:     41 y.o. female with the following -     1. Malignant neoplasm of left female breast, unspecified estrogen receptor status, unspecified site of breast (HCC)  See HPI for details, following with Dr. Rivera at [facility], appointment next week    2. Anxiety  Pebbles is following with psychiatry; strongly encouraged her to re-establish with psychologist. Discussed the importance of regular exercise, healthy diet, good sleep hygiene, and positive social interaction.    3. Gastroesophageal reflux disease, unspecified whether esophagitis present  Severe GERD while on chemotherapy which has persisted. Patient has tapered omeprazole 40mg daily-->20mg daily. Continuing to work on weight loss, recognizes she needs to avoid trigger foods.    4. Need for vaccination  - INFLUENZA VACCINE QUAD INJ (PF)  - Pneumococal Polysaccharide Vaccine 23-Valent =>3YO SQ/IM      Return in about 6 months (around 4/15/2022).    Please note this dictation was created using voice recognition software. I have made every reasonable attempt to correct obvious errors, but I expect there may be errors of grammar, and possibly content, that I did not discover before finalizing the note.       "

## 2021-10-15 NOTE — ASSESSMENT & PLAN NOTE
The patient follows with psychiatry, Dr. Candy Zuniga.  She is on Wellbutrin XL for 450 mg q. morning, Xanax 0.25 mg as needed. She reports her symptoms are fairly well-controlled on this regimen however notes more anxiety recently. She is not currently established with a psychologist/counselor. She also notes she was previously exercising regularly and is not currently getting regular exercise.

## 2021-10-15 NOTE — ASSESSMENT & PLAN NOTE
The patient reports she was diagnosed with triple negative breast cancer 9/2017.  Lump initially identified in left breast.  She is BRCA1 positive.  Patient was treated with double mastectomy, chemo, radiation, and most recently immunotherapy which she finished 2/2020.  The patient was following with Dr. Whitehead, she is now established with Dr. Rivera in [place]. She had a consultation at [place] 3/2021, PET scan completed at [facility].    Double mastectomy completed at [facility] 4/2018, reconstructions completed at [place] [facility] in [place].    Patient is s/p total hysterectomy completed by Dr. Woods. She is now on estradiol 1mg daily.     She has an appointment with Dr. Rivera next week. Pebbles reports she continues to have mild-mod persistent fatigue. Echocardiogram scheduled to rule out cardiac etiology 2/2 chemo.

## 2021-10-18 ENCOUNTER — TELEPHONE (OUTPATIENT)
Dept: MEDICAL GROUP | Facility: MEDICAL CENTER | Age: 41
End: 2021-10-18

## 2021-10-18 NOTE — TELEPHONE ENCOUNTER
Phone Number Called: [phone] (home)     Call outcome: Spoke to patient regarding message below.    Message: pt. Called and left a voicemail stating that she got her pneumonia vaccine and flu vaccine last week her arm is hurting and hot and red to the touch. Called patient she states her arm arm starting hurting really bad on Saturday and she was starting to feel sick. She states it does feel better today. She states her arm is still hurting. She states her arm is still warm and swollen.  She states she does feel her arm is improving. Informed patient to keep an eye on it and if it gets worse to go to Urgent Care. She verbalized understanding. Informed patient after a pneumonia vaccine your arm does typically get sore and to definitely keep an eye on it. She verbalized understanding.

## 2021-10-25 ENCOUNTER — HOSPITAL ENCOUNTER (OUTPATIENT)
Dept: CARDIOLOGY | Facility: MEDICAL CENTER | Age: 41
End: 2021-10-25
Attending: INTERNAL MEDICINE
Payer: COMMERCIAL

## 2021-10-25 DIAGNOSIS — R06.00 DYSPNEA, PAROXYSMAL NOCTURNAL: ICD-10-CM

## 2021-10-25 DIAGNOSIS — R53.83 FATIGUE, UNSPECIFIED TYPE: ICD-10-CM

## 2021-10-25 PROCEDURE — 93306 TTE W/DOPPLER COMPLETE: CPT

## 2021-10-26 LAB
LV EJECT FRACT  99904: 60
LV EJECT FRACT MOD 2C 99903: 55.13
LV EJECT FRACT MOD 4C 99902: 54.87
LV EJECT FRACT MOD BP 99901: 56.89

## 2021-10-26 PROCEDURE — 93306 TTE W/DOPPLER COMPLETE: CPT | Mod: 26 | Performed by: INTERNAL MEDICINE

## 2021-11-03 ENCOUNTER — HOSPITAL ENCOUNTER (OUTPATIENT)
Dept: LAB | Facility: MEDICAL CENTER | Age: 41
End: 2021-11-03
Attending: INTERNAL MEDICINE
Payer: COMMERCIAL

## 2021-11-03 DIAGNOSIS — E55.9 VITAMIN D DEFICIENCY: ICD-10-CM

## 2021-11-03 DIAGNOSIS — E03.8 SUBCLINICAL HYPOTHYROIDISM: ICD-10-CM

## 2021-11-03 DIAGNOSIS — E06.3 HASHIMOTO'S THYROIDITIS: ICD-10-CM

## 2021-11-03 LAB
25(OH)D3 SERPL-MCNC: 61 NG/ML (ref 30–100)
ALBUMIN SERPL BCP-MCNC: 4.8 G/DL (ref 3.2–4.9)
ALBUMIN/GLOB SERPL: 1.6 G/DL
ALP SERPL-CCNC: 59 U/L (ref 30–99)
ALT SERPL-CCNC: 11 U/L (ref 2–50)
ANION GAP SERPL CALC-SCNC: 12 MMOL/L (ref 7–16)
AST SERPL-CCNC: 14 U/L (ref 12–45)
BILIRUB SERPL-MCNC: 0.2 MG/DL (ref 0.1–1.5)
BUN SERPL-MCNC: 13 MG/DL (ref 8–22)
CALCIUM SERPL-MCNC: 9.7 MG/DL (ref 8.5–10.5)
CHLORIDE SERPL-SCNC: 99 MMOL/L (ref 96–112)
CO2 SERPL-SCNC: 26 MMOL/L (ref 20–33)
CREAT SERPL-MCNC: 0.83 MG/DL (ref 0.5–1.4)
GLOBULIN SER CALC-MCNC: 3 G/DL (ref 1.9–3.5)
GLUCOSE SERPL-MCNC: 89 MG/DL (ref 65–99)
POTASSIUM SERPL-SCNC: 4.2 MMOL/L (ref 3.6–5.5)
PROT SERPL-MCNC: 7.8 G/DL (ref 6–8.2)
SODIUM SERPL-SCNC: 137 MMOL/L (ref 135–145)
T4 FREE SERPL-MCNC: 1.48 NG/DL (ref 0.93–1.7)
TSH SERPL DL<=0.005 MIU/L-ACNC: 1.18 UIU/ML (ref 0.38–5.33)

## 2021-11-03 PROCEDURE — 82306 VITAMIN D 25 HYDROXY: CPT

## 2021-11-03 PROCEDURE — 36415 COLL VENOUS BLD VENIPUNCTURE: CPT

## 2021-11-03 PROCEDURE — 80053 COMPREHEN METABOLIC PANEL: CPT

## 2021-11-03 PROCEDURE — 84443 ASSAY THYROID STIM HORMONE: CPT

## 2021-11-03 PROCEDURE — 84439 ASSAY OF FREE THYROXINE: CPT

## 2021-11-05 ENCOUNTER — OFFICE VISIT (OUTPATIENT)
Dept: ENDOCRINOLOGY | Facility: MEDICAL CENTER | Age: 41
End: 2021-11-05
Attending: INTERNAL MEDICINE
Payer: COMMERCIAL

## 2021-11-05 VITALS
WEIGHT: 165 LBS | SYSTOLIC BLOOD PRESSURE: 122 MMHG | HEART RATE: 84 BPM | BODY MASS INDEX: 28.17 KG/M2 | HEIGHT: 64 IN | DIASTOLIC BLOOD PRESSURE: 80 MMHG | OXYGEN SATURATION: 99 %

## 2021-11-05 DIAGNOSIS — E06.3 HASHIMOTO'S THYROIDITIS: ICD-10-CM

## 2021-11-05 DIAGNOSIS — L65.9 HAIR LOSS: ICD-10-CM

## 2021-11-05 DIAGNOSIS — E55.9 VITAMIN D DEFICIENCY: ICD-10-CM

## 2021-11-05 DIAGNOSIS — E04.2 NONTOXIC MULTINODULAR GOITER: ICD-10-CM

## 2021-11-05 DIAGNOSIS — E03.8 SUBCLINICAL HYPOTHYROIDISM: ICD-10-CM

## 2021-11-05 PROCEDURE — 99214 OFFICE O/P EST MOD 30 MIN: CPT | Performed by: INTERNAL MEDICINE

## 2021-11-05 PROCEDURE — 99212 OFFICE O/P EST SF 10 MIN: CPT | Performed by: INTERNAL MEDICINE

## 2021-11-05 ASSESSMENT — FIBROSIS 4 INDEX: FIB4 SCORE: 0.64

## 2021-11-05 NOTE — PROGRESS NOTES
Chief Complaint: Follow up for Primary Hypothyroidism secondary to Hashimoto's thyroiditis and history of nontoxic multinodular goiter.      HPI:     Pebbles Cosme is a 39 y.o. female here for follow up of Primary Hypothyroidism.      Comorbid issues include BRCA1 + status and triple negative breast cancer s/p bilateral mastectomy and TAHBSO      Since last visit patient reports feeling poor.  She remains on Synthroid 112 MCG daily which has been her dose for the past 3 months.   She  denies taking any iron, calcium supplements or antacids.  She admits to missing doses.    She still reports fatigue, dry skin, constipation and is complaining of hair loss    Previously TSH was suboptimal 2.8 while on Synthroid 100  Her most recent TSH is better at 1.18 with a free T4 1.48 on November 3, 2021  Vitamin D was 61 on November 3, 2021          She also has a history of nontoxic multinodular goiter with last ultrasound completed in October 2019 with a dominant 1 cm hypoechoic solid nodule on the left mid lobe and subcentimeter nodule on the right lobe which appear to be low risk for malignancy per my read.  She denies a family history of thyroid cancer and denies radiation exposure.    Follow-up thyroid ultrasound completed on September 20, 2020 showed stable left mid lobe nodule with no evidence of significant change or growth.    She denies feeling any new lumps or swelling on her neck  I ordered an ultrasound last visit but she was unable to get the ultrasound done because of insurance problems      Patient's medications, allergies, and social histories were reviewed and updated as appropriate.      ROS:     CONS:     No fever, no chills   EYES:     No diplopia, no blurry vision   CV:           No chest pain, no palpitations   PULM:     No SOB, no cough, no hemoptysis.   GI:            No nausea, no vomiting, no diarrhea, no constipation   ENDO:     No polyuria, no polydipsia, no heat intolerance, denies cold  intolerance       Past Medical History:  Problem List:  2020-11: Personal history of breast cancer  2020-09: GERD (gastroesophageal reflux disease)  2020-09: Recurrent major depressive disorder, in full remission ([facility])  2020-03: Nontoxic multinodular goiter  2020-03: Hashimoto's thyroiditis  2020-01: Research study patient  2020-01: [facility]  2019-02: Impaired fasting glucose  2019-02: Morbid obesity ([facility])  2019-02: Weight gain  2019-01: Subclinical hypothyroidism  2019-01: Class 3 severe obesity without serious comorbidity in adult   ([facility])  2019-01: Fatigue  2019-01: Vitamin D deficiency  2018-06: BRCA gene mutation positive in female  2018-06: Breast cancer ([facility])  2018-06: Infected breast tissue expander ([facility])  2018-05: Acquired absence of bilateral breasts and nipples  2018-03: Situational anxiety  2017-08: Generalized anxiety disorder with panic attacks  2015-06: H/O LEEP  2015-06: Family hx of ovarian malignancy  2015-06: Decreased energy  2015-06: Insomnia  2015-06: Anxiety      Past Surgical History:  Past Surgical History:   Procedure Laterality Date   • BREAST IMPLANT REMOVAL Right 6/18/2018    Procedure: BREAST EXPANDER  REMOVAL;  Surgeon: Niki Osuna M.D.;  Location: SURGERY [facility];  Service: Plastics   • FLAP GRAFT Right 5/16/2018    Procedure: FLAP GRAFT-   ADJACENT TISSUE TRANSFER OR REARRANGEMENT, TRUNK;  Surgeon: Niki Osuna M.D.;  Location: SURGERY SAME DAY [facility];  Service: Plastics   • OTHER  04/2018    bilateral mastectomy   • OTHER  03/2018    lymph node dissection   • OTHER  10/11/2017    port placement   • ABDOMINAL HYSTERECTOMY TOTAL     • OPEN REDUCTION      R foot   • OTHER      wisdom teeth removed        Allergies:  Patient has no known allergies.     Social History:  Social History     Tobacco Use   • Smoking status: Light Tobacco Smoker     Packs/day: 0.03     Years: 0.00     Pack years: 0.00     Types: Cigarettes   • Smokeless  "tobacco: Never Used   • Tobacco comment: rare/ once a week    Vaping Use   • Vaping Use: Never used   Substance Use Topics   • Alcohol use: Yes     Comment: ocassional   • Drug use: No        Family History:   family history includes Cancer (age of onset: 45) in her mother; Heart Disease in her father; Hyperlipidemia in her father; Hypertension in her father.      PHYSICAL EXAM:   Vital signs: /80   Pulse 84   Ht 1.626 m (5' 4\")   Wt 74.8 kg (165 lb)   LMP 11/01/2017 (Approximate) Comment: last dose chemo 3/12/18.  SpO2 99%   BMI 28.32 kg/m²   GENERAL: Well-developed, well-nourished in no apparent distress.   EYE:  No ocular asymmetry, PERRLA  HENT: Pink, moist mucous membranes.    NECK: Thyroid is slightly enlarged and feels bosselated  CARDIOVASCULAR:  No murmurs  LUNGS: Clear breath sounds  ABDOMEN: Soft, nontender   EXTREMITIES: No clubbing, cyanosis, or edema.   NEUROLOGICAL: No gross focal motor abnormalities   LYMPH: No cervical adenopathy seen  SKIN: No rashes, lesions.       Labs:  Lab Results   Component Value Date/Time    SODIUM 137 11/03/2021 03:22 PM    POTASSIUM 4.2 11/03/2021 03:22 PM    CHLORIDE 99 11/03/2021 03:22 PM    CO2 26 11/03/2021 03:22 PM    ANION 12.0 11/03/2021 03:22 PM    GLUCOSE 89 11/03/2021 03:22 PM    BUN 13 11/03/2021 03:22 PM    CREATININE 0.83 11/03/2021 03:22 PM    CALCIUM 9.7 11/03/2021 03:22 PM    ASTSGOT 14 11/03/2021 03:22 PM    ALTSGPT 11 11/03/2021 03:22 PM    TBILIRUBIN 0.2 11/03/2021 03:22 PM    ALBUMIN 4.8 11/03/2021 03:22 PM    TOTPROTEIN 7.8 11/03/2021 03:22 PM    GLOBULIN 3.0 11/03/2021 03:22 PM    AGRATIO 1.6 11/03/2021 03:22 PM       Lab Results   Component Value Date/Time    SODIUM 134 (L) 02/24/2020 1117    POTASSIUM 3.7 02/24/2020 1117    CHLORIDE 101 02/24/2020 1117    CO2 23 02/24/2020 1117    GLUCOSE 110 (H) 02/24/2020 1117    BUN 16 02/24/2020 1117    CREATININE 0.85 02/24/2020 1117    CALCIUM 8.8 02/24/2020 1117    ANION 10.0 02/24/2020 1117 "       Lab Results   Component Value Date/Time    CHOLSTRLTOT 150 01/06/2017 1142    TRIGLYCERIDE 35 01/06/2017 1142    HDL 73 01/06/2017 1142    LDL 70 01/06/2017 1142       Lab Results   Component Value Date/Time    TSHULTRASEN 2.980 02/24/2020 1117     Lab Results   Component Value Date/Time    FREET4 1.03 12/03/2019 1120     Lab Results   Component Value Date/Time    FREET3 3.48 12/03/2019 1120     No results found for: THYSTIMIG    Lab Results   Component Value Date/Time    MICROSOMALA 15 08/13/2019 1216         Imaging: See ultrasound from September 2020 discussed and reviewed with the patient      ASSESSMENT/PLAN:     1. Subclinical hypothyroidism  Stable  Her TSH is better but she is still complaining of hypothyroid symptoms  We discussed the possibility of combination therapy today  Continue Synthroid to 112 mcg daily  Consider adding Cytomel  Will check T3 first and I will update her on her labs and add Cytomel or liothyronine  Reviewed importance of adherence  Follow-up in 6 months with repeat of TSH, free T4 and free T3    2. Hashimoto's thyroiditis  This is the etiology of her hypothyroidism    3. Nontoxic multinodular goiter  Stable  She has a 1 cm dominant hypoechoic wider than tall solid nodule on the left mid lobe with no suspicious microcalcifications  I ordered an ultrasound last visit but she was not able to get it done because of insurance problems  She informed me that she has to resolve her insurance issues before she can get the US done    4. Vitamin D deficiency  Controlled  Continue current supplements   repeat labs in 6 months      Return in about 6 months (around 5/5/2022).      Thank you kindly for allowing me to participate in the thyroid care plan for this patient.    Seth Benavides MD, FACE, NU  03/17/20    CC:   Pcp Pt States None

## 2022-01-26 ENCOUNTER — HOSPITAL ENCOUNTER (OUTPATIENT)
Dept: LAB | Facility: MEDICAL CENTER | Age: 42
End: 2022-01-26
Attending: FAMILY MEDICINE
Payer: COMMERCIAL

## 2022-01-26 ENCOUNTER — HOSPITAL ENCOUNTER (OUTPATIENT)
Dept: LAB | Facility: MEDICAL CENTER | Age: 42
End: 2022-01-26
Attending: INTERNAL MEDICINE
Payer: COMMERCIAL

## 2022-01-26 DIAGNOSIS — E03.8 SUBCLINICAL HYPOTHYROIDISM: ICD-10-CM

## 2022-01-26 DIAGNOSIS — E55.9 VITAMIN D DEFICIENCY: ICD-10-CM

## 2022-01-26 DIAGNOSIS — L65.9 HAIR LOSS: ICD-10-CM

## 2022-01-26 DIAGNOSIS — E06.3 HASHIMOTO'S THYROIDITIS: ICD-10-CM

## 2022-01-26 LAB
25(OH)D3 SERPL-MCNC: 54 NG/ML (ref 30–100)
25(OH)D3 SERPL-MCNC: 54 NG/ML (ref 30–100)
ALBUMIN SERPL BCP-MCNC: 4.2 G/DL (ref 3.2–4.9)
ALBUMIN SERPL BCP-MCNC: 4.3 G/DL (ref 3.2–4.9)
ALBUMIN/GLOB SERPL: 1.6 G/DL
ALBUMIN/GLOB SERPL: 1.6 G/DL
ALP SERPL-CCNC: 69 U/L (ref 30–99)
ALP SERPL-CCNC: 70 U/L (ref 30–99)
ALT SERPL-CCNC: 12 U/L (ref 2–50)
ALT SERPL-CCNC: 7 U/L (ref 2–50)
ANION GAP SERPL CALC-SCNC: 11 MMOL/L (ref 7–16)
ANION GAP SERPL CALC-SCNC: 12 MMOL/L (ref 7–16)
AST SERPL-CCNC: 14 U/L (ref 12–45)
AST SERPL-CCNC: 15 U/L (ref 12–45)
BASOPHILS # BLD AUTO: 0.3 % (ref 0–1.8)
BASOPHILS # BLD: 0.03 K/UL (ref 0–0.12)
BILIRUB SERPL-MCNC: 0.2 MG/DL (ref 0.1–1.5)
BILIRUB SERPL-MCNC: 0.2 MG/DL (ref 0.1–1.5)
BUN SERPL-MCNC: 18 MG/DL (ref 8–22)
BUN SERPL-MCNC: 18 MG/DL (ref 8–22)
CALCIUM SERPL-MCNC: 8.6 MG/DL (ref 8.5–10.5)
CALCIUM SERPL-MCNC: 8.6 MG/DL (ref 8.5–10.5)
CHLORIDE SERPL-SCNC: 102 MMOL/L (ref 96–112)
CHLORIDE SERPL-SCNC: 102 MMOL/L (ref 96–112)
CHOLEST SERPL-MCNC: 179 MG/DL (ref 100–199)
CO2 SERPL-SCNC: 25 MMOL/L (ref 20–33)
CO2 SERPL-SCNC: 26 MMOL/L (ref 20–33)
CREAT SERPL-MCNC: 0.65 MG/DL (ref 0.5–1.4)
CREAT SERPL-MCNC: 0.78 MG/DL (ref 0.5–1.4)
EOSINOPHIL # BLD AUTO: 0.09 K/UL (ref 0–0.51)
EOSINOPHIL NFR BLD: 0.9 % (ref 0–6.9)
ERYTHROCYTE [DISTWIDTH] IN BLOOD BY AUTOMATED COUNT: 44.1 FL (ref 35.9–50)
EST. AVERAGE GLUCOSE BLD GHB EST-MCNC: 105 MG/DL
FASTING STATUS PATIENT QL REPORTED: NORMAL
FASTING STATUS PATIENT QL REPORTED: NORMAL
FERRITIN SERPL-MCNC: 66.6 NG/ML (ref 10–291)
GLOBULIN SER CALC-MCNC: 2.7 G/DL (ref 1.9–3.5)
GLOBULIN SER CALC-MCNC: 2.7 G/DL (ref 1.9–3.5)
GLUCOSE SERPL-MCNC: 86 MG/DL (ref 65–99)
GLUCOSE SERPL-MCNC: 87 MG/DL (ref 65–99)
HBA1C MFR BLD: 5.3 % (ref 4–5.6)
HCT VFR BLD AUTO: 39.2 % (ref 37–47)
HDLC SERPL-MCNC: 60 MG/DL
HGB BLD-MCNC: 13 G/DL (ref 12–16)
IMM GRANULOCYTES # BLD AUTO: 0.06 K/UL (ref 0–0.11)
IMM GRANULOCYTES NFR BLD AUTO: 0.6 % (ref 0–0.9)
IRON SATN MFR SERPL: 18 % (ref 15–55)
IRON SERPL-MCNC: 66 UG/DL (ref 40–170)
LDLC SERPL CALC-MCNC: 87 MG/DL
LYMPHOCYTES # BLD AUTO: 3 K/UL (ref 1–4.8)
LYMPHOCYTES NFR BLD: 29.9 % (ref 22–41)
MCH RBC QN AUTO: 32.2 PG (ref 27–33)
MCHC RBC AUTO-ENTMCNC: 33.2 G/DL (ref 33.6–35)
MCV RBC AUTO: 97 FL (ref 81.4–97.8)
MONOCYTES # BLD AUTO: 0.55 K/UL (ref 0–0.85)
MONOCYTES NFR BLD AUTO: 5.5 % (ref 0–13.4)
NEUTROPHILS # BLD AUTO: 6.32 K/UL (ref 2–7.15)
NEUTROPHILS NFR BLD: 62.8 % (ref 44–72)
NRBC # BLD AUTO: 0 K/UL
NRBC BLD-RTO: 0 /100 WBC
PLATELET # BLD AUTO: 339 K/UL (ref 164–446)
PMV BLD AUTO: 9.3 FL (ref 9–12.9)
POTASSIUM SERPL-SCNC: 3.9 MMOL/L (ref 3.6–5.5)
POTASSIUM SERPL-SCNC: 3.9 MMOL/L (ref 3.6–5.5)
PROT SERPL-MCNC: 6.9 G/DL (ref 6–8.2)
PROT SERPL-MCNC: 7 G/DL (ref 6–8.2)
RBC # BLD AUTO: 4.04 M/UL (ref 4.2–5.4)
SODIUM SERPL-SCNC: 139 MMOL/L (ref 135–145)
SODIUM SERPL-SCNC: 139 MMOL/L (ref 135–145)
T3FREE SERPL-MCNC: 2.87 PG/ML (ref 2–4.4)
T4 FREE SERPL-MCNC: 1.39 NG/DL (ref 0.93–1.7)
T4 FREE SERPL-MCNC: 1.4 NG/DL (ref 0.93–1.7)
TIBC SERPL-MCNC: 364 UG/DL (ref 250–450)
TRIGL SERPL-MCNC: 159 MG/DL (ref 0–149)
TSH SERPL DL<=0.005 MIU/L-ACNC: 2.97 UIU/ML (ref 0.38–5.33)
TSH SERPL DL<=0.005 MIU/L-ACNC: 2.98 UIU/ML (ref 0.38–5.33)
UIBC SERPL-MCNC: 298 UG/DL (ref 110–370)
VIT B12 SERPL-MCNC: 583 PG/ML (ref 211–911)
WBC # BLD AUTO: 10.1 K/UL (ref 4.8–10.8)

## 2022-01-26 PROCEDURE — 84443 ASSAY THYROID STIM HORMONE: CPT

## 2022-01-26 PROCEDURE — 83036 HEMOGLOBIN GLYCOSYLATED A1C: CPT

## 2022-01-26 PROCEDURE — 82607 VITAMIN B-12: CPT

## 2022-01-26 PROCEDURE — 83540 ASSAY OF IRON: CPT

## 2022-01-26 PROCEDURE — 84439 ASSAY OF FREE THYROXINE: CPT

## 2022-01-26 PROCEDURE — 82728 ASSAY OF FERRITIN: CPT

## 2022-01-26 PROCEDURE — 82306 VITAMIN D 25 HYDROXY: CPT

## 2022-01-26 PROCEDURE — 80053 COMPREHEN METABOLIC PANEL: CPT | Mod: 91

## 2022-01-26 PROCEDURE — 36415 COLL VENOUS BLD VENIPUNCTURE: CPT

## 2022-01-26 PROCEDURE — 83550 IRON BINDING TEST: CPT

## 2022-01-26 PROCEDURE — 84481 FREE ASSAY (FT-3): CPT

## 2022-01-26 PROCEDURE — 85025 COMPLETE CBC W/AUTO DIFF WBC: CPT

## 2022-01-26 PROCEDURE — 80053 COMPREHEN METABOLIC PANEL: CPT

## 2022-01-26 PROCEDURE — 82306 VITAMIN D 25 HYDROXY: CPT | Mod: 91

## 2022-01-26 PROCEDURE — 84443 ASSAY THYROID STIM HORMONE: CPT | Mod: 91

## 2022-01-26 PROCEDURE — 80061 LIPID PANEL: CPT

## 2022-01-26 PROCEDURE — 84439 ASSAY OF FREE THYROXINE: CPT | Mod: 91

## 2022-01-28 DIAGNOSIS — E03.8 SUBCLINICAL HYPOTHYROIDISM: ICD-10-CM

## 2022-01-28 DIAGNOSIS — E55.9 VITAMIN D DEFICIENCY: ICD-10-CM

## 2022-01-28 DIAGNOSIS — E53.8 B12 DEFICIENCY: ICD-10-CM

## 2022-01-28 DIAGNOSIS — E06.3 HASHIMOTO'S THYROIDITIS: ICD-10-CM

## 2022-01-28 RX ORDER — LIOTHYRONINE SODIUM 5 UG/1
5 TABLET ORAL DAILY
Qty: 90 TABLET | Refills: 1 | Status: SHIPPED | OUTPATIENT
Start: 2022-01-28 | End: 2022-07-20

## 2022-01-28 RX ORDER — LEVOTHYROXINE SODIUM 112 MCG
112 TABLET ORAL
Qty: 90 TABLET | Refills: 1 | Status: SHIPPED | OUTPATIENT
Start: 2022-01-28 | End: 2022-05-06 | Stop reason: SDUPTHER
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
## 2022-03-01 ENCOUNTER — TELEPHONE (OUTPATIENT)
Dept: ENDOCRINOLOGY | Facility: MEDICAL CENTER | Age: 42
End: 2022-03-01
Payer: COMMERCIAL

## 2022-03-01 NOTE — TELEPHONE ENCOUNTER
Patient called and let me know that she needs a PA on her brand Synthroid. She has the same [employer] blue shield plan as last year.  Please advise.

## 2022-03-01 NOTE — TELEPHONE ENCOUNTER
DOCUMENTATION OF PAR STATUS:    1. Name of Medication & Dose: Synthroid 112 mcg     2. Name of Prescription Coverage Company & phone #: [employer]    3. Date Prior Auth Submitted: 3/1/22    4. What information was given to obtain insurance decision? In chard    5. Prior Auth Status? Pending    6. Patient Notified: no    Key: [id number]

## 2022-04-12 ENCOUNTER — TELEMEDICINE (OUTPATIENT)
Dept: MEDICAL GROUP | Facility: MEDICAL CENTER | Age: 42
End: 2022-04-12
Payer: COMMERCIAL

## 2022-04-12 VITALS — HEIGHT: 64 IN | BODY MASS INDEX: 29.37 KG/M2 | WEIGHT: 172 LBS

## 2022-04-12 DIAGNOSIS — F41.9 ANXIETY: ICD-10-CM

## 2022-04-12 DIAGNOSIS — E06.3 HASHIMOTO'S THYROIDITIS: ICD-10-CM

## 2022-04-12 DIAGNOSIS — K21.9 GASTROESOPHAGEAL REFLUX DISEASE, UNSPECIFIED WHETHER ESOPHAGITIS PRESENT: ICD-10-CM

## 2022-04-12 DIAGNOSIS — C50.912 MALIGNANT NEOPLASM OF LEFT FEMALE BREAST, UNSPECIFIED ESTROGEN RECEPTOR STATUS, UNSPECIFIED SITE OF BREAST (HCC): ICD-10-CM

## 2022-04-12 DIAGNOSIS — F33.42 RECURRENT MAJOR DEPRESSIVE DISORDER, IN FULL REMISSION (HCC): ICD-10-CM

## 2022-04-12 PROCEDURE — 99214 OFFICE O/P EST MOD 30 MIN: CPT | Mod: 95 | Performed by: FAMILY MEDICINE

## 2022-04-12 RX ORDER — OMEPRAZOLE 20 MG/1
20 CAPSULE, DELAYED RELEASE ORAL
Qty: 90 CAPSULE | Refills: 2 | Status: SHIPPED | OUTPATIENT
Start: 2022-04-12 | End: 2023-01-20 | Stop reason: SDUPTHER

## 2022-04-12 ASSESSMENT — PATIENT HEALTH QUESTIONNAIRE - PHQ9: CLINICAL INTERPRETATION OF PHQ2 SCORE: 0

## 2022-04-12 ASSESSMENT — FIBROSIS 4 INDEX: FIB4 SCORE: 0.67

## 2022-04-12 NOTE — PROGRESS NOTES
Telemedicine Visit: Established Patient     This evaluation was conducted via Zoom using secure and encrypted videoconferencing technology. The patient was in a private location in the [facility].    The patient's identity was confirmed and verbal consent was obtained for this virtual visit.    Subjective:   CC: follow up chronic conditions  Pebbles Cosme is a 41 y.o. female presenting for evaluation and management of:    Breast cancer (HCC)  The patient reports she was diagnosed with triple negative breast cancer 9/2017.  Lump initially identified in left breast.  She is BRCA1 positive.  Patient was treated with double mastectomy, chemo, radiation, and most recently immunotherapy which she finished 2/2020.  The patient was following with Dr. Whitehead, she is now established with Dr. Rivera in [place].    Double mastectomy completed at [facility] 4/2018, reconstructions completed at [facility] in [place].    Patient is s/p total hysterectomy completed by Dr. Owusu. She is on estradiol 1mg daily.     PET scan 1/2022 clear, plan for repeat PET 1/2023.    She is working on an exercise program through "[employer] and is loving this program.      GERD (gastroesophageal reflux disease)  This is a chronic problem.  The patient reports severe reflux while on chemotherapy.  She is working on tapering. She continues to work on tapering her omeprazole.  She was previously on omeprazole 40 mg daily and has tapered to 20 mg daily. Patient denies dysphagia, odynophagia, globus, choking, melena, hematochezia.      Anxiety  The patient follows with psychiatry, Dr. Candy Zuniga.  She is on Wellbutrin XL for 450 mg q. morning, Xanax 0.25 mg as needed. She reports symptoms are well-controlled. She also utilizes regular exercise to treat anxiety and depression.    Hashimoto's thyroiditis  This is a chronic condition for which the patient follows with endocrinology, Dr. Benavides.  She  is on levothyroxine 112 mcg q. morning and Cytomel 5 mcg q. morning      ROS   Denies any recent fevers or chills. No nausea or vomiting. No chest pains or shortness of breath.     No Known Allergies    Current medicines (including changes today)  Current Outpatient Medications   Medication Sig Dispense Refill   • omeprazole (PRILOSEC) 20 MG delayed-release capsule Take 1 Capsule by mouth every day. 90 Capsule 2   • SYNTHROID 112 MCG Tab Take 1 Tablet by mouth every morning on an empty stomach. 90 Tablet 1   • liothyronine (CYTOMEL) 5 MCG Tab Take 1 Tablet by mouth every day. 90 Tablet 1   • estradiol (ESTRACE) 0.1 MG/GM vaginal cream      • buPROPion (WELLBUTRIN XL) 150 MG XL tablet TK 1 T PO QD WITH 300 MG T     • ALPRAZolam (XANAX) 0.25 MG Tab      • buPROPion (WELLBUTRIN XL) 300 MG XL tablet Take 300 mg by mouth every morning.     • estradiol (ESTRACE) 1 MG Tab Take  by mouth every day. Take 1 tablet by mouth every day     • amphetamine-dextroamphetamine (ADDERALL) 10 MG Tab 10 mg 2 times a day.     • Multiple Vitamins-Minerals (MULTIVITAMIN ADULT PO) Take 1 Tab by mouth every day.     • Cholecalciferol 4000 units Cap Take 1 capsule by mouth every day.     • Ascorbic Acid (VITAMIN C) Powder Take 300 g by mouth.     • Melatonin 10 MG Cap Take 20 mg by mouth.     • TURMERIC CURCUMIN PO Take 1 Tab by mouth every day.       No current facility-administered medications for this visit.       Patient Active Problem List    Diagnosis Date Noted   • Personal history of breast cancer 11/19/2020   • GERD (gastroesophageal reflux disease) 09/29/2020   • Recurrent major depressive disorder, in full remission (HCC) 09/29/2020   • Nontoxic multinodular goiter 03/17/2020   • Hashimoto's thyroiditis 03/17/2020   • Research study patient 01/13/2020   • [facility]   • Subclinical hypothyroidism 01/24/2019   • Vitamin D deficiency 01/24/2019   • BRCA gene mutation positive in female 06/19/2018   •  "Breast cancer (HCC) 06/15/2018   • Acquired absence of bilateral breasts and nipples 05/15/2018   • Generalized anxiety disorder with panic attacks 08/07/2017   • H/O LEEP 06/01/2015   • Family hx of ovarian malignancy 06/01/2015   • Insomnia 06/01/2015   • Anxiety 06/01/2015       Family History   Problem Relation Age of Onset   • Cancer Mother 45        ovarian ca   • Hypertension Father    • Heart Disease Father    • Hyperlipidemia Father        She  has a past medical history of Anxiety, Cancer (HCC) (11/2017), Pain, and Psychiatric problem.    She has no past medical history of ASTHMA, Diabetes, Hyperlipidemia, or Hypertension.  She  has a past surgical history that includes open reduction; other (10/11/2017); other (04/2018); other (03/2018); other; flap graft (Right, 5/16/2018); breast implant removal (Right, 6/18/2018); and abdominal hysterectomy total.       Objective:   Ht 1.626 m (5' 4\") Comment: pt. reported  Wt 78 kg (172 lb) Comment: pt. reported  LMP 11/01/2017 (Approximate) Comment: last dose chemo 3/12/18.  BMI 29.52 kg/m²     Physical Exam:  Constitutional: Alert, no distress, well-groomed.  Skin: No rashes in visible areas.  Eye: Round. Conjunctiva clear, lids normal. No icterus.   ENMT: Lips pink without lesions, good dentition, moist mucous membranes. Phonation normal.  Respiratory: Unlabored respiratory effort, no cough or audible wheeze  Psych: Alert and oriented x3, normal affect and mood.       Assessment and Plan:   The following treatment plan was discussed:     1. Malignant neoplasm of left female breast, unspecified estrogen receptor status, unspecified site of breast (HCC)  - Following with Dr. Rivera, see HPI for details, PET negative 1/22 with plan to repeat in one year    2. Gastroesophageal reflux disease, unspecified whether esophagitis present  - Pebbles will continue working on taper of PPI    3. Anxiety  4. Recurrent major depressive disorder, in full remission (HCC)  - " Symptoms well-controlled, continue current regimen per psychiatry, continue regular exercise and healthy lifestyle modification    5. Hashimoto's thyroiditis  - Continue levothyroxine and cytomel per Dr. Benavides     Other orders  - omeprazole (PRILOSEC) 20 MG delayed-release capsule; Take 1 Capsule by mouth every day.  Dispense: 90 Capsule; Refill: 2        Follow-up: Return in about 6 months (around 10/12/2022).

## 2022-04-12 NOTE — ASSESSMENT & PLAN NOTE
The patient reports she was diagnosed with triple negative breast cancer 9/2017.  Lump initially identified in left breast.  She is BRCA1 positive.  Patient was treated with double mastectomy, chemo, radiation, and most recently immunotherapy which she finished 2/2020.  The patient was following with Dr. Whitehead, she is now established with Dr. Rivera in [place].    Double mastectomy completed at [facility] 4/2018, reconstructions completed at [facility] in [place].    Patient is s/p total hysterectomy completed by Dr. Owusu. She is on estradiol 1mg daily.     PET scan 1/2022 clear, plan for repeat PET 1/2023.    She is working on an exercise program through [employer] and is loving this program.

## 2022-04-12 NOTE — ASSESSMENT & PLAN NOTE
History  Chief Complaint   Patient presents with    Hip Pain     L hip pain, radiates down leg to L foot  Unable to bear wt - no known injury     52F on hospice for end-stage COPD and cardiomyopathy, presents with 1 week of atraumatic L hip pain  Unable to bear weight  On chronic steroids for lung ds, and on high dose morphine already but pain is uncontrolled  Pt not a surgical candidate but wants to know if hip is fractured  Prior to Admission Medications   Prescriptions Last Dose Informant Patient Reported? Taking?    ALPRAZolam (XANAX) 1 mg tablet 3/9/2018 at Unknown time Self Yes Yes   Sig: Take 1 mg by mouth 3 (three) times a day as needed for anxiety   NON FORMULARY  Self Yes No   Sig: Colandra dose unknown oral daily   acetaminophen (TYLENOL) 325 mg tablet   No No   Sig: Take 2 tablets by mouth every 6 (six) hours as needed for mild pain, headaches or fever   diazepam (VALIUM) 5 mg tablet 3/8/2018 at Unknown time  Yes Yes   Sig: Take 5 mg by mouth daily   doxepin (SINEquan) 150 MG capsule 3/8/2018 at Unknown time  No Yes   Sig: Take 1 capsule by mouth daily at bedtime   famotidine (PEPCID) 20 mg tablet 3/9/2018 at Unknown time  No Yes   Sig: Take 1 tablet by mouth 2 (two) times a day   furosemide (LASIX) 40 mg tablet 3/9/2018 at Unknown time  No Yes   Sig: Take 1 tablet by mouth daily   Patient taking differently: Take 80 mg by mouth 2 (two) times a day     ipratropium-albuterol (DUO-NEB) 0 5-2 5 mg/mL 3/9/2018 at Unknown time  No Yes   Sig: Take 3 mL by nebulization every 6 (six) hours   lidocaine (LIDODERM) 5 % More than a month at Unknown time  No No   Sig: Place 1 patch on the skin daily Remove & Discard patch within 12 hours or as directed by MD   metolazone (ZAROXOLYN) 5 mg tablet   Yes Yes   Sig: Take 5 mg by mouth daily   morphine (DARVIN) 60 MG 24 hr capsule 3/9/2018 at Unknown time  Yes Yes   Sig: Take 90 mg by mouth daily   nystatin (MYCOSTATIN) 100,000 units/mL suspension This is a chronic condition for which the patient follows with endocrinology, Dr. Benavides.  She is on levothyroxine 112 mcg q. morning and Cytomel 5 mcg q. morning   3/9/2018 at Unknown time  No Yes   Sig: Swish and swallow 5 mL 4 (four) times a day   oxyCODONE-acetaminophen (PERCOCET) 5-325 mg per tablet 3/9/2018 at Unknown time  Yes Yes   Sig: Take 1 tablet by mouth every 4 (four) hours as needed for moderate pain   predniSONE 10 mg tablet 3/9/2018 at Unknown time  No Yes   Sig: Take 40 mg per day for 2 days, then 30 mg per day for 2 days, then 20 mg daily   Patient taking differently: Take 30 mg by mouth daily Take 40 mg per day for 2 days, then 30 mg per day for 2 days, then 20 mg daily    spironolactone (ALDACTONE) 100 mg tablet 3/9/2018 at Unknown time  No Yes   Sig: Take 1 tablet by mouth daily      Facility-Administered Medications: None       Past Medical History:   Diagnosis Date    [facility] fibrillation [facility]     Brain aneurysm     coil and stent in place    Cardiac disease     Cardiomyopathy ([facility]     COPD (chronic obstructive pulmonary disease) ([facility]     Left leg pain     Oxygen dependent     O2 at 3 5 L NC    Psychiatric disorder     Pulmonary embolism (HCC)        Past Surgical History:   Procedure Laterality Date    APPENDECTOMY      CARDIAC CATHETERIZATION      states ejection fracture of 15%    CARDIAC DEFIBRILLATOR PLACEMENT      THORACOSCOPY Left 2013    Left thorascopic biopsy of benign left lung nodule       Family History   Problem Relation Age of Onset    Cardiomyopathy Father      I have reviewed and agree with the history as documented  Social History   Substance Use Topics    Smoking status: Former Smoker     Packs/day: 1 00     Years: 30 00     Quit date: 1/28/2013    Smokeless tobacco: Never Used      Comment: smoked 1 to 2 PPD x 30 years    Alcohol use No        Review of Systems   Constitutional: Negative for fever  Respiratory: Negative for cough  Gastrointestinal: Negative for abdominal pain  Musculoskeletal: Negative for back pain  Neurological: Negative for headaches     All other systems reviewed and are negative  Physical Exam  ED Triage Vitals [03/09/18 1627]   Temperature Pulse Respirations Blood Pressure SpO2   97 8 °F (36 6 °C) 100 20 119/70 94 %      Temp Source Heart Rate Source Patient Position - Orthostatic VS BP Location FiO2 (%)   Tympanic Monitor Lying Right arm --      Pain Score       8           Orthostatic Vital Signs  Vitals:    03/09/18 1627 03/09/18 1630 03/09/18 1900 03/09/18 2212   BP: 119/70 119/70 141/86 118/68   Pulse: 100 102 (!) 106 104   Patient Position - Orthostatic VS: Lying   Lying       Physical Exam   Constitutional: She is oriented to person, place, and time  She appears well-developed  HENT:   Mouth/Throat: Oropharynx is clear and moist    Eyes: Conjunctivae are normal    Neck: Neck supple  Cardiovascular: Normal rate and regular rhythm  Pulmonary/Chest: Effort normal and breath sounds normal    Abdominal: Soft  Bowel sounds are normal    Musculoskeletal:   Limited ROM L hip, pain with passive flexion/extension   Neurological: She is alert and oriented to person, place, and time  LLE distal pulses / sensation intact   Skin: Skin is warm and dry  Psychiatric: She has a normal mood and affect  Vitals reviewed        ED Medications  Medications   nicotine polacrilex (NICORETTE) gum 2 mg (2 mg Oral Not Given 3/9/18 2233)   ALPRAZolam (XANAX) tablet 1 mg (not administered)   diazepam (VALIUM) tablet 5 mg (not administered)   doxepin (SINEquan) capsule 150 mg (150 mg Oral Given 3/9/18 2233)   famotidine (PEPCID) tablet 20 mg (20 mg Oral Given 3/9/18 2234)   furosemide (LASIX) tablet 40 mg (not administered)   ipratropium-albuterol (DUO-NEB) 0 5-2 5 mg/3 mL inhalation solution 3 mL (not administered)   metolazone (ZAROXOLYN) tablet 5 mg (not administered)   spironolactone (ALDACTONE) tablet 100 mg (not administered)   heparin (porcine) subcutaneous injection 5,000 Units (5,000 Units Subcutaneous Given 3/9/18 2234)   HYDROmorphone (DILAUDID) injection 1 mg (not administered)   predniSONE tablet 30 mg (not administered)   morphine (MS CONTIN) ER tablet 30 mg (30 mg Oral Given 3/9/18 2234)   albuterol inhalation solution 2 5 mg (not administered)   influenza inactivated quadrivalent vaccine (FLULAVAL) IM injection 0 5 mL (not administered)   HYDROmorphone (DILAUDID) injection 2 mg (2 mg Intramuscular Given 3/9/18 1728)   ALPRAZolam (XANAX) tablet 1 mg (1 mg Oral Given 3/9/18 1852)   HYDROmorphone (DILAUDID) injection 1 mg (1 mg Intravenous Given 3/9/18 2125)       Diagnostic Studies  Results Reviewed     None                 XR hip/pelv 2-3 vws left   Final Result by Karlene Logan MD (03/09 1849)      Marked joint space narrowing and sclerosis of the left hip with flattening of the femoral head and lateral uncovering, suggestive of avascular necrosis  Workstation performed: [id number]         XR shoulder 2+ vw right    (Results Pending)              Procedures  Procedures       Phone Contacts  ED Phone Contact    ED Course  ED Course                                MDM  Number of Diagnoses or Management Options  Hip fracture [facility]):   Diagnosis management comments: Pt with L femoral head fx likely d/t chronic steroid use  Not a surgical candidate and not interested in surgery   Case discussed with Dr Nika Pairkh of ortho who advises only tx would be hemiarthroplasty if pt were to desire surgery  Pain uncontrolled, hospice team working on arranging in-home hospital bed  Pt placed on observation for pain control and DC tomorrow once services in place for home care         CritCare Time    Disposition  Final diagnoses:   Hip fracture ([facility] )     Time reflects when diagnosis was documented in both MDM as applicable and the Disposition within this note     Time User Action Codes Description Comment    3/9/2018  8:44 PM Zoraida Zimmerman Add [S72 009A] Hip fracture ([facility] )     3/9/2018  8:59 PM Aspen DELGADO Add [M87 [id number]] Avascular necrosis of hip, left ([facility] )     3/9/2018 8:59 PM Tyron Garcia [V18 [id number]] Avascular necrosis of hip, left [facility])       ED Disposition     ED Disposition Condition Comment    Admit  Case was discussed with Dr Petar Mcleod and the patient's admission status was agreed to be Admission Status: observation status to the service of Dr Crescencio Hector          Follow-up Information    None       Current Discharge Medication List      CONTINUE these medications which have NOT CHANGED    Details   ALPRAZolam (XANAX) 1 mg tablet Take 1 mg by mouth 3 (three) times a day as needed for anxiety      diazepam (VALIUM) 5 mg tablet Take 5 mg by mouth daily      doxepin (SINEquan) 150 MG capsule Take 1 capsule by mouth daily at bedtime  Qty: 30 capsule, Refills: 0      famotidine (PEPCID) 20 mg tablet Take 1 tablet by mouth 2 (two) times a day  Qty: 60 tablet, Refills: 0      furosemide (LASIX) 40 mg tablet Take 1 tablet by mouth daily  Qty: 30 tablet, Refills: 0      ipratropium-albuterol (DUO-NEB) 0 5-2 5 mg/mL Take 3 mL by nebulization every 6 (six) hours  Qty: 360 mL, Refills: 0      metolazone (ZAROXOLYN) 5 mg tablet Take 5 mg by mouth daily      morphine (DARVIN) 60 MG 24 hr capsule Take 90 mg by mouth daily      nystatin (MYCOSTATIN) 100,000 units/mL suspension Swish and swallow 5 mL 4 (four) times a day  Qty: 60 mL, Refills: 0      oxyCODONE-acetaminophen (PERCOCET) 5-325 mg per tablet Take 1 tablet by mouth every 4 (four) hours as needed for moderate pain      predniSONE 10 mg tablet Take 40 mg per day for 2 days, then 30 mg per day for 2 days, then 20 mg daily  Qty: 80 tablet, Refills: 0      spironolactone (ALDACTONE) 100 mg tablet Take 1 tablet by mouth daily  Qty: 30 tablet, Refills: 0      acetaminophen (TYLENOL) 325 mg tablet Take 2 tablets by mouth every 6 (six) hours as needed for mild pain, headaches or fever  Qty: 30 tablet, Refills: 0      lidocaine (LIDODERM) 5 % Place 1 patch on the skin daily Remove & Discard patch within 12 hours or as directed by MD  Qty: 30 patch, Refills: 0      NON FORMULARY Colandra dose unknown oral daily           No discharge procedures on file      ED Provider  Electronically Signed by           Jose Rick DO  03/09/18 0411

## 2022-04-12 NOTE — ASSESSMENT & PLAN NOTE
The patient follows with psychiatry, Dr. Candy Zuniga.  She is on Wellbutrin XL for 450 mg q. morning, Xanax 0.25 mg as needed. She reports symptoms are well-controlled. She also utilizes regular exercise to treat anxiety and depression.

## 2022-04-12 NOTE — ASSESSMENT & PLAN NOTE
This is a chronic problem.  The patient reports severe reflux while on chemotherapy.  She is working on tapering. She continues to work on tapering her omeprazole.  She was previously on omeprazole 40 mg daily and has tapered to 20 mg daily. Patient denies dysphagia, odynophagia, globus, choking, melena, hematochezia.

## 2022-04-12 NOTE — LETTER
[facility]  Simona Mccracken M.D.  [address]  [place] [address]  Fax: [phone]   Authorization for Release/Disclosure of   Protected Health Information   Name: PASTORA AYERS : 1980 SSN: [id number]   Address: [address] [place] [address] [place] [address] Phone:    [phone] (home)    I authorize the entity listed below to release/disclose the PHI below to:   [facility]/Simona Mccracken M.D. and Simona Mccracken M.D.   Provider or Entity Name:  [facility] Address     Address   [facility]   Phone:[phone]      Fax:[phone]     Reason for request: continuity of care   Information to be released:    [  ] LAST COLONOSCOPY,  including any PATH REPORT and follow-up  [  ] LAST FIT/COLOGUARD RESULT [  ] LAST DEXA  [  ] LAST MAMMOGRAM  [ xxx ] LAST PAP  [  ] LAST LABS [  ] RETINA EXAM REPORT  [  ] IMMUNIZATION RECORDS  [  ] Release all info      [  ] Check here and initial the line next to each item to release ALL health information INCLUDING  _____ Care and treatment for drug and / or alcohol abuse  _____ HIV testing, infection status, or AIDS  _____ Genetic Testing    DATES OF SERVICE OR TIME PERIOD TO BE DISCLOSED: _____________  I understand and acknowledge that:  * This Authorization may be revoked at any time by you in writing, except if your health information has already been used or disclosed.  * Your health information that will be used or disclosed as a result of you signing this authorization could be re-disclosed by the recipient. If this occurs, your re-disclosed health information may no longer be protected by State or Federal laws.  * You may refuse to sign this Authorization. Your refusal will not affect your ability to obtain treatment.  * This Authorization becomes effective upon signing and will  on (date) __________.      If no date is indicated, this Authorization will  one (1) year from the signature date.    Name: Pastora Vo  Ba    Signature:Continuation of Care   Date:     4/12/2022       PLEASE FAX REQUESTED RECORDS BACK TO: [phone]

## 2022-05-02 ENCOUNTER — HOSPITAL ENCOUNTER (OUTPATIENT)
Dept: LAB | Facility: MEDICAL CENTER | Age: 42
End: 2022-05-02
Attending: INTERNAL MEDICINE
Payer: COMMERCIAL

## 2022-05-02 DIAGNOSIS — E55.9 VITAMIN D DEFICIENCY: ICD-10-CM

## 2022-05-02 DIAGNOSIS — E03.8 SUBCLINICAL HYPOTHYROIDISM: ICD-10-CM

## 2022-05-02 DIAGNOSIS — E53.8 B12 DEFICIENCY: ICD-10-CM

## 2022-05-02 DIAGNOSIS — E06.3 HASHIMOTO'S THYROIDITIS: ICD-10-CM

## 2022-05-02 LAB
25(OH)D3 SERPL-MCNC: 64 NG/ML (ref 30–100)
ALBUMIN SERPL BCP-MCNC: 4.6 G/DL (ref 3.2–4.9)
ALBUMIN/GLOB SERPL: 1.8 G/DL
ALP SERPL-CCNC: 52 U/L (ref 30–99)
ALT SERPL-CCNC: 19 U/L (ref 2–50)
ANION GAP SERPL CALC-SCNC: 15 MMOL/L (ref 7–16)
AST SERPL-CCNC: 26 U/L (ref 12–45)
BILIRUB SERPL-MCNC: 0.5 MG/DL (ref 0.1–1.5)
BUN SERPL-MCNC: 15 MG/DL (ref 8–22)
CALCIUM SERPL-MCNC: 9.2 MG/DL (ref 8.5–10.5)
CHLORIDE SERPL-SCNC: 97 MMOL/L (ref 96–112)
CO2 SERPL-SCNC: 23 MMOL/L (ref 20–33)
CREAT SERPL-MCNC: 0.76 MG/DL (ref 0.5–1.4)
GFR SERPLBLD CREATININE-BSD FMLA CKD-EPI: 99 ML/MIN/1.73 M 2
GLOBULIN SER CALC-MCNC: 2.6 G/DL (ref 1.9–3.5)
GLUCOSE SERPL-MCNC: 87 MG/DL (ref 65–99)
POTASSIUM SERPL-SCNC: 4.1 MMOL/L (ref 3.6–5.5)
PROT SERPL-MCNC: 7.2 G/DL (ref 6–8.2)
SODIUM SERPL-SCNC: 135 MMOL/L (ref 135–145)
T3FREE SERPL-MCNC: 2.4 PG/ML (ref 2–4.4)
T4 FREE SERPL-MCNC: 1.42 NG/DL (ref 0.93–1.7)
TSH SERPL DL<=0.005 MIU/L-ACNC: 0.8 UIU/ML (ref 0.38–5.33)
VIT B12 SERPL-MCNC: 510 PG/ML (ref 211–911)

## 2022-05-02 PROCEDURE — 84439 ASSAY OF FREE THYROXINE: CPT

## 2022-05-02 PROCEDURE — 84443 ASSAY THYROID STIM HORMONE: CPT

## 2022-05-02 PROCEDURE — 84481 FREE ASSAY (FT-3): CPT

## 2022-05-02 PROCEDURE — 82306 VITAMIN D 25 HYDROXY: CPT

## 2022-05-02 PROCEDURE — 82607 VITAMIN B-12: CPT

## 2022-05-02 PROCEDURE — 80053 COMPREHEN METABOLIC PANEL: CPT

## 2022-05-02 PROCEDURE — 36415 COLL VENOUS BLD VENIPUNCTURE: CPT

## 2022-05-06 ENCOUNTER — OFFICE VISIT (OUTPATIENT)
Dept: ENDOCRINOLOGY | Facility: MEDICAL CENTER | Age: 42
End: 2022-05-06
Attending: INTERNAL MEDICINE
Payer: COMMERCIAL

## 2022-05-06 VITALS
SYSTOLIC BLOOD PRESSURE: 118 MMHG | OXYGEN SATURATION: 100 % | DIASTOLIC BLOOD PRESSURE: 80 MMHG | WEIGHT: 170 LBS | HEART RATE: 74 BPM | HEIGHT: 64 IN | BODY MASS INDEX: 29.02 KG/M2

## 2022-05-06 DIAGNOSIS — E06.3 HASHIMOTO'S THYROIDITIS: ICD-10-CM

## 2022-05-06 DIAGNOSIS — E03.8 SUBCLINICAL HYPOTHYROIDISM: ICD-10-CM

## 2022-05-06 DIAGNOSIS — E04.2 NONTOXIC MULTINODULAR GOITER: ICD-10-CM

## 2022-05-06 DIAGNOSIS — E55.9 VITAMIN D DEFICIENCY: ICD-10-CM

## 2022-05-06 PROCEDURE — 99214 OFFICE O/P EST MOD 30 MIN: CPT | Performed by: INTERNAL MEDICINE

## 2022-05-06 PROCEDURE — 99212 OFFICE O/P EST SF 10 MIN: CPT | Performed by: INTERNAL MEDICINE

## 2022-05-06 RX ORDER — LEVOTHYROXINE SODIUM 112 MCG
112 TABLET ORAL
Qty: 90 TABLET | Refills: 1 | Status: SHIPPED | OUTPATIENT
Start: 2022-05-06 | End: 2022-10-30

## 2022-05-06 ASSESSMENT — FIBROSIS 4 INDEX: FIB4 SCORE: 0.76

## 2022-05-06 NOTE — PROGRESS NOTES
Chief Complaint: Follow up for Primary Hypothyroidism secondary to Hashimoto's thyroiditis and history of nontoxic multinodular goiter.      HPI:     Pebbles Cosme is a 41 y.o. female here for follow up of Primary Hypothyroidism.      Comorbid issues include BRCA1 + status and triple negative breast cancer s/p bilateral mastectomy and TAHBSO    She has tried and failed generic Levothyroxine.    Since last visit patient reports feeling poor.  She remains on Synthroid 112 MCG daily and Cytomel 5mcg daily  which has been her dose for the past 6 months.   She  denies taking any iron, calcium supplements or antacids.  She admits to missing doses.    She reports feeling better since adding cytomel  She reports less hair loss   Her energy is better    TSH is 0.800,  free T4 is 1.42 on Synthroid 100 plus Cytomel 5  TSH was suboptimal 2.8 while on Synthroid 100  TSH was better at 1.18 with a free T4 1.48 on November 3, 2021  Vitamin D was 61 on November 3, 2021          She also has a history of nontoxic multinodular goiter with last ultrasound completed in October 2019 with a dominant 1 cm hypoechoic solid nodule on the left mid lobe and subcentimeter nodule on the right lobe which appear to be low risk for malignancy per my read.  She denies a family history of thyroid cancer and denies radiation exposure.    Follow-up thyroid ultrasound completed on September 20, 2020 showed stable left mid lobe nodule with no evidence of significant change or growth.    She denies feeling any new lumps or swelling on her neck  I ordered an ultrasound last visit but she was unable to get the ultrasound done because of insurance problems      Patient's medications, allergies, and social histories were reviewed and updated as appropriate.      ROS:     CONS:     No fever, no chills   EYES:     No diplopia, no blurry vision   CV:           No chest pain, no palpitations   PULM:     No SOB, no cough, no hemoptysis.   GI:            No  nausea, no vomiting, no diarrhea, no constipation   ENDO:     No polyuria, no polydipsia, no heat intolerance, denies cold intolerance       Past Medical History:  Problem List:  2020-11: Personal history of breast cancer  2020-09: GERD (gastroesophageal reflux disease)  2020-09: Recurrent major depressive disorder, in full remission ([facility])  2020-03: Nontoxic multinodular goiter  2020-03: Hashimoto's thyroiditis  2020-01: Research study patient  2020-01: [facility] Billing  2019-02: Impaired fasting glucose  2019-02: Morbid obesity ([facility])  2019-02: Weight gain  2019-01: Subclinical hypothyroidism  2019-01: Class 3 severe obesity without serious comorbidity in adult   ([facility])  2019-01: Fatigue  2019-01: Vitamin D deficiency  2018-06: BRCA gene mutation positive in female  2018-06: Breast cancer ([facility])  2018-06: Infected breast tissue expander ([facility])  2018-05: Acquired absence of bilateral breasts and nipples  2018-03: Situational anxiety  2017-08: Generalized anxiety disorder with panic attacks  2015-06: H/O LEEP  2015-06: Family hx of ovarian malignancy  2015-06: Decreased energy  2015-06: Insomnia  2015-06: Anxiety      Past Surgical History:  Past Surgical History:   Procedure Laterality Date   • BREAST IMPLANT REMOVAL Right 6/18/2018    Procedure: BREAST EXPANDER  REMOVAL;  Surgeon: Niki Osuna M.D.;  Location: SURGERY [facility];  Service: Plastics   • FLAP GRAFT Right 5/16/2018    Procedure: FLAP GRAFT-   ADJACENT TISSUE TRANSFER OR REARRANGEMENT, TRUNK;  Surgeon: Niki Osuna M.D.;  Location: SURGERY SAME DAY [facility];  Service: Plastics   • OTHER  04/2018    bilateral mastectomy   • OTHER  03/2018    lymph node dissection   • OTHER  10/11/2017    port placement   • ABDOMINAL HYSTERECTOMY TOTAL     • OPEN REDUCTION      R foot   • OTHER      wisdom teeth removed        Allergies:  Patient has no known allergies.     Social History:  Social History     Tobacco Use   • Smoking  "status: Light Tobacco Smoker     Packs/day: 0.03     Years: 0.00     Pack years: 0.00     Types: Cigarettes   • Smokeless tobacco: Never Used   • Tobacco comment: rare/ once a week    Vaping Use   • Vaping Use: Never used   Substance Use Topics   • Alcohol use: Yes     Comment: ocassional   • Drug use: No        Family History:   family history includes Cancer (age of onset: 45) in her mother; Heart Disease in her father; Hyperlipidemia in her father; Hypertension in her father.      PHYSICAL EXAM:   Vital signs: /80   Pulse 74   Ht 1.626 m (5' 4\")   Wt 77.1 kg (170 lb)   LMP 11/01/2017 (Approximate) Comment: last dose chemo 3/12/18.  SpO2 100%   BMI 29.18 kg/m²   GENERAL: Well-developed, well-nourished in no apparent distress.   EYE:  No ocular asymmetry, PERRLA  HENT: Pink, moist mucous membranes.    NECK: Thyroid is slightly enlarged and feels bosselated  CARDIOVASCULAR:  No murmurs  LUNGS: Clear breath sounds  ABDOMEN: Soft, nontender   EXTREMITIES: No clubbing, cyanosis, or edema.   NEUROLOGICAL: No gross focal motor abnormalities   LYMPH: No cervical adenopathy seen  SKIN: No rashes, lesions.       Labs:  Lab Results   Component Value Date/Time    SODIUM 135 05/02/2022 03:35 PM    POTASSIUM 4.1 05/02/2022 03:35 PM    CHLORIDE 97 05/02/2022 03:35 PM    CO2 23 05/02/2022 03:35 PM    ANION 15.0 05/02/2022 03:35 PM    GLUCOSE 87 05/02/2022 03:35 PM    BUN 15 05/02/2022 03:35 PM    CREATININE 0.76 05/02/2022 03:35 PM    CALCIUM 9.2 05/02/2022 03:35 PM    ASTSGOT 26 05/02/2022 03:35 PM    ALTSGPT 19 05/02/2022 03:35 PM    TBILIRUBIN 0.5 05/02/2022 03:35 PM    ALBUMIN 4.6 05/02/2022 03:35 PM    TOTPROTEIN 7.2 05/02/2022 03:35 PM    GLOBULIN 2.6 05/02/2022 03:35 PM    AGRATIO 1.8 05/02/2022 03:35 PM       Lab Results   Component Value Date/Time    SODIUM 134 (L) 02/24/2020 1117    POTASSIUM 3.7 02/24/2020 1117    CHLORIDE 101 02/24/2020 1117    CO2 23 02/24/2020 1117    GLUCOSE 110 (H) 02/24/2020 1117    " BUN 16 02/24/2020 1117    CREATININE 0.85 02/24/2020 1117    CALCIUM 8.8 02/24/2020 1117    ANION 10.0 02/24/2020 1117       Lab Results   Component Value Date/Time    CHOLSTRLTOT 150 01/06/2017 1142    TRIGLYCERIDE 35 01/06/2017 1142    HDL 73 01/06/2017 1142    LDL 70 01/06/2017 1142       Lab Results   Component Value Date/Time    TSHULTRASEN 2.980 02/24/2020 1117     Lab Results   Component Value Date/Time    FREET4 1.03 12/03/2019 1120     Lab Results   Component Value Date/Time    FREET3 3.48 12/03/2019 1120     No results found for: THYSTIMIG    Lab Results   Component Value Date/Time    MICROSOMALA 15 08/13/2019 1216         Imaging: See ultrasound from September 2020 discussed and reviewed with the patient      ASSESSMENT/PLAN:     1. Subclinical hypothyroidism  Stable  Continue Synthroid 112 mcg daily  Continue Cytomel 5mcg daily  She will let me know if her insurance doesn't want to cover Synthroid so we can do a PA or if PA is denied then we will   send a script to Synthroid direct   Reviewed importance of adherence  Follow-up in 6 months with repeat of TSH, free T4 and free T3    2. Hashimoto's thyroiditis  This is the etiology of her hypothyroidism    3. Nontoxic multinodular goiter  Stable  She has a 1 cm dominant hypoechoic wider than tall solid nodule on the left mid lobe with no suspicious microcalcifications  I ordered an ultrasound last visit but she was not able to get it done because of insurance problems  She informed me that her US is scheduled  I will update her on the test results    4. Vitamin D deficiency  Controlled  Continue current supplements  Repeat labs in 6 months      Return in about 6 months (around 11/6/2022).      Thank you kindly for allowing me to participate in the thyroid care plan for this patient.    Seth Benavides MD, FACE, ECU  03/17/20    CC:   Pcp Pt States None

## 2022-06-14 ENCOUNTER — OFFICE VISIT (OUTPATIENT)
Dept: URGENT CARE | Facility: PHYSICIAN GROUP | Age: 42
End: 2022-06-14
Payer: COMMERCIAL

## 2022-06-14 ENCOUNTER — HOSPITAL ENCOUNTER (OUTPATIENT)
Facility: MEDICAL CENTER | Age: 42
End: 2022-06-14
Attending: NURSE PRACTITIONER
Payer: COMMERCIAL

## 2022-06-14 VITALS
RESPIRATION RATE: 16 BRPM | DIASTOLIC BLOOD PRESSURE: 70 MMHG | OXYGEN SATURATION: 97 % | HEIGHT: 64 IN | BODY MASS INDEX: 28.68 KG/M2 | SYSTOLIC BLOOD PRESSURE: 112 MMHG | TEMPERATURE: 97.7 F | WEIGHT: 168 LBS | HEART RATE: 88 BPM

## 2022-06-14 DIAGNOSIS — J02.9 SORE THROAT: ICD-10-CM

## 2022-06-14 DIAGNOSIS — J10.1 INFLUENZA A: ICD-10-CM

## 2022-06-14 DIAGNOSIS — J02.0 STREP THROAT: ICD-10-CM

## 2022-06-14 DIAGNOSIS — R05.9 COUGH: ICD-10-CM

## 2022-06-14 DIAGNOSIS — R50.9 FEVER, UNSPECIFIED FEVER CAUSE: ICD-10-CM

## 2022-06-14 LAB
EXTERNAL QUALITY CONTROL: NORMAL
FLUAV+FLUBV AG SPEC QL IA: NORMAL
INT CON NEG: NORMAL
INT CON NEG: NORMAL
INT CON POS: NORMAL
INT CON POS: NORMAL
S PYO AG THROAT QL: POSITIVE
SARS-COV+SARS-COV-2 AG RESP QL IA.RAPID: NORMAL

## 2022-06-14 PROCEDURE — 87426 SARSCOV CORONAVIRUS AG IA: CPT | Performed by: NURSE PRACTITIONER

## 2022-06-14 PROCEDURE — U0005 INFEC AGEN DETEC AMPLI PROBE: HCPCS

## 2022-06-14 PROCEDURE — 87804 INFLUENZA ASSAY W/OPTIC: CPT | Performed by: NURSE PRACTITIONER

## 2022-06-14 PROCEDURE — U0003 INFECTIOUS AGENT DETECTION BY NUCLEIC ACID (DNA OR RNA); SEVERE ACUTE RESPIRATORY SYNDROME CORONAVIRUS 2 (SARS-COV-2) (CORONAVIRUS DISEASE [COVID-19]), AMPLIFIED PROBE TECHNIQUE, MAKING USE OF HIGH THROUGHPUT TECHNOLOGIES AS DESCRIBED BY CMS-2020-01-R: HCPCS

## 2022-06-14 PROCEDURE — 87880 STREP A ASSAY W/OPTIC: CPT | Performed by: NURSE PRACTITIONER

## 2022-06-14 PROCEDURE — 99214 OFFICE O/P EST MOD 30 MIN: CPT | Performed by: NURSE PRACTITIONER

## 2022-06-14 RX ORDER — BENZONATATE 100 MG/1
CAPSULE ORAL
Qty: 60 CAPSULE | Refills: 0 | Status: SHIPPED | OUTPATIENT
Start: 2022-06-14 | End: 2023-03-24

## 2022-06-14 RX ORDER — AMOXICILLIN 500 MG/1
500 CAPSULE ORAL 2 TIMES DAILY
Qty: 20 CAPSULE | Refills: 0 | Status: SHIPPED | OUTPATIENT
Start: 2022-06-14 | End: 2022-06-24

## 2022-06-14 ASSESSMENT — FIBROSIS 4 INDEX: FIB4 SCORE: 0.77

## 2022-06-14 NOTE — LETTER
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  A concern for COVID-19 was identified and testing was done.     Please  follow the the CDC guidelines for return to work or being around others.    In general repeat testing is not necessary and not offered through [facility].  Repeat testing is also not recommended by the CDC.    This is the only note that will be provided from the [facility] for this illness.             VIKKI Galindo  Electronically Signed

## 2022-06-15 DIAGNOSIS — R50.9 FEVER, UNSPECIFIED FEVER CAUSE: ICD-10-CM

## 2022-06-15 DIAGNOSIS — J02.9 SORE THROAT: ICD-10-CM

## 2022-06-15 LAB
COVID ORDER STATUS COVID19: NORMAL
SARS-COV-2 RNA RESP QL NAA+PROBE: NOTDETECTED
SPECIMEN SOURCE: NORMAL

## 2022-07-20 DIAGNOSIS — E03.8 SUBCLINICAL HYPOTHYROIDISM: ICD-10-CM

## 2022-07-20 RX ORDER — LIOTHYRONINE SODIUM 5 UG/1
5 TABLET ORAL DAILY
Qty: 90 TABLET | Refills: 1 | Status: SHIPPED | OUTPATIENT
Start: 2022-07-20 | End: 2023-01-11

## 2022-07-28 ENCOUNTER — OFFICE VISIT (OUTPATIENT)
Dept: URGENT CARE | Facility: PHYSICIAN GROUP | Age: 42
End: 2022-07-28

## 2022-07-28 VITALS
HEART RATE: 75 BPM | HEIGHT: 64 IN | BODY MASS INDEX: 28 KG/M2 | SYSTOLIC BLOOD PRESSURE: 134 MMHG | TEMPERATURE: 98.2 F | DIASTOLIC BLOOD PRESSURE: 78 MMHG | RESPIRATION RATE: 18 BRPM | OXYGEN SATURATION: 98 % | WEIGHT: 164 LBS

## 2022-07-28 DIAGNOSIS — Z02.89 ENCOUNTER FOR EXAMINATION REQUIRED BY DEPARTMENT OF TRANSPORTATION (DOT): ICD-10-CM

## 2022-07-28 PROCEDURE — 7100 PR DOT PHYSICAL: Mod: 25 | Performed by: FAMILY MEDICINE

## 2022-07-28 ASSESSMENT — FIBROSIS 4 INDEX: FIB4 SCORE: 0.77

## 2022-07-28 NOTE — PROGRESS NOTES
Patient here for DOT physical.  She has been very stable on all of her medications with no recent changes.  Reviewed past medication lists and past primary care provider notes.  She only uses Xanax every few months and it is only to help sleep.  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  She is cleared for 2-year certificate.  Please see scanned forms in chart.

## 2023-01-13 ENCOUNTER — TELEPHONE (OUTPATIENT)
Dept: ENDOCRINOLOGY | Facility: MEDICAL CENTER | Age: 43
End: 2023-01-13

## 2023-01-13 DIAGNOSIS — E04.2 NONTOXIC MULTINODULAR GOITER: ICD-10-CM

## 2023-01-17 ENCOUNTER — HOSPITAL ENCOUNTER (OUTPATIENT)
Dept: RADIOLOGY | Facility: MEDICAL CENTER | Age: 43
End: 2023-01-17
Attending: INTERNAL MEDICINE
Payer: COMMERCIAL

## 2023-01-17 DIAGNOSIS — E04.2 NONTOXIC MULTINODULAR GOITER: ICD-10-CM

## 2023-01-17 PROCEDURE — 76536 US EXAM OF HEAD AND NECK: CPT

## 2023-01-20 NOTE — TELEPHONE ENCOUNTER
From: Pebbles Cosme  To: Office of Physician Simona Brown  Sent: 1/19/2023 7:55 PM PST  Subject: Medication Renewal Request    Refills have been requested for the following medications:     omeprazole (PRILOSEC) 20 MG delayed-release capsule [Physician Simona Brown M.D.]   Patient Comment: Hello. [facility] sent a refill request for this prescription but they say they haven’t received a response. I appreciate your assistance. Thank you.    Preferred pharmacy: [id number] - [place], [place] - [address]  Delivery method: Pickup

## 2023-01-20 NOTE — TELEPHONE ENCOUNTER
Received request via: Pharmacy    Was the patient seen in the last year in this department? Yes    Does the patient have an active prescription (recently filled or refills available) for medication(s) requested? No    Does the patient have skilled nursing Plus and need 100 day supply (blood pressure, diabetes and cholesterol meds only)? Patient does not have SCP    Future Appointments         Provider Department [place]    3/24/2023 9:50 AM Pam Louise R.N.; Seth Benavides M.D. [facility]    4/14/2023 10:20 AM Simona Brown M.D. [facility]

## 2023-01-25 RX ORDER — OMEPRAZOLE 20 MG/1
20 CAPSULE, DELAYED RELEASE ORAL
Qty: 30 CAPSULE | Refills: 0 | Status: SHIPPED | OUTPATIENT
Start: 2023-01-25

## 2023-02-01 ENCOUNTER — HOSPITAL ENCOUNTER (OUTPATIENT)
Dept: RADIOLOGY | Facility: MEDICAL CENTER | Age: 43
End: 2023-02-01
Payer: COMMERCIAL

## 2023-03-22 ENCOUNTER — HOSPITAL ENCOUNTER (OUTPATIENT)
Dept: LAB | Facility: MEDICAL CENTER | Age: 43
End: 2023-03-22
Attending: INTERNAL MEDICINE
Payer: COMMERCIAL

## 2023-03-22 DIAGNOSIS — E55.9 VITAMIN D DEFICIENCY: ICD-10-CM

## 2023-03-22 DIAGNOSIS — E06.3 HASHIMOTO'S THYROIDITIS: ICD-10-CM

## 2023-03-22 DIAGNOSIS — E03.8 SUBCLINICAL HYPOTHYROIDISM: ICD-10-CM

## 2023-03-22 DIAGNOSIS — E04.2 NONTOXIC MULTINODULAR GOITER: ICD-10-CM

## 2023-03-22 LAB
25(OH)D3 SERPL-MCNC: 51 NG/ML (ref 30–100)
ALBUMIN SERPL BCP-MCNC: 3.9 G/DL (ref 3.2–4.9)
ALBUMIN/GLOB SERPL: 1.3 G/DL
ALP SERPL-CCNC: 69 U/L (ref 30–99)
ALT SERPL-CCNC: 11 U/L (ref 2–50)
ANION GAP SERPL CALC-SCNC: 9 MMOL/L (ref 7–16)
AST SERPL-CCNC: 16 U/L (ref 12–45)
BILIRUB SERPL-MCNC: 0.3 MG/DL (ref 0.1–1.5)
BUN SERPL-MCNC: 23 MG/DL (ref 8–22)
CALCIUM ALBUM COR SERPL-MCNC: 8.7 MG/DL (ref 8.5–10.5)
CALCIUM SERPL-MCNC: 8.6 MG/DL (ref 8.5–10.5)
CHLORIDE SERPL-SCNC: 106 MMOL/L (ref 96–112)
CO2 SERPL-SCNC: 25 MMOL/L (ref 20–33)
CREAT SERPL-MCNC: 0.69 MG/DL (ref 0.5–1.4)
FASTING STATUS PATIENT QL REPORTED: NORMAL
GFR SERPLBLD CREATININE-BSD FMLA CKD-EPI: 109 ML/MIN/1.73 M 2
GLOBULIN SER CALC-MCNC: 2.9 G/DL (ref 1.9–3.5)
GLUCOSE SERPL-MCNC: 85 MG/DL (ref 65–99)
POTASSIUM SERPL-SCNC: 4.4 MMOL/L (ref 3.6–5.5)
PROT SERPL-MCNC: 6.8 G/DL (ref 6–8.2)
SODIUM SERPL-SCNC: 140 MMOL/L (ref 135–145)
T3FREE SERPL-MCNC: 2.75 PG/ML (ref 2–4.4)
T4 FREE SERPL-MCNC: 1.09 NG/DL (ref 0.93–1.7)
TSH SERPL DL<=0.005 MIU/L-ACNC: 1.16 UIU/ML (ref 0.38–5.33)

## 2023-03-22 PROCEDURE — 84439 ASSAY OF FREE THYROXINE: CPT

## 2023-03-22 PROCEDURE — 84443 ASSAY THYROID STIM HORMONE: CPT

## 2023-03-22 PROCEDURE — 80053 COMPREHEN METABOLIC PANEL: CPT

## 2023-03-22 PROCEDURE — 36415 COLL VENOUS BLD VENIPUNCTURE: CPT

## 2023-03-22 PROCEDURE — 84481 FREE ASSAY (FT-3): CPT

## 2023-03-22 PROCEDURE — 82306 VITAMIN D 25 HYDROXY: CPT

## 2023-03-24 ENCOUNTER — OFFICE VISIT (OUTPATIENT)
Dept: ENDOCRINOLOGY | Facility: MEDICAL CENTER | Age: 43
End: 2023-03-24
Attending: INTERNAL MEDICINE
Payer: COMMERCIAL

## 2023-03-24 VITALS
WEIGHT: 177 LBS | HEIGHT: 64 IN | DIASTOLIC BLOOD PRESSURE: 80 MMHG | SYSTOLIC BLOOD PRESSURE: 122 MMHG | BODY MASS INDEX: 30.22 KG/M2 | OXYGEN SATURATION: 99 % | HEART RATE: 76 BPM

## 2023-03-24 DIAGNOSIS — E04.2 NONTOXIC MULTINODULAR GOITER: ICD-10-CM

## 2023-03-24 DIAGNOSIS — E55.9 VITAMIN D DEFICIENCY: ICD-10-CM

## 2023-03-24 DIAGNOSIS — E06.3 HASHIMOTO'S THYROIDITIS: ICD-10-CM

## 2023-03-24 DIAGNOSIS — E03.8 SUBCLINICAL HYPOTHYROIDISM: ICD-10-CM

## 2023-03-24 DIAGNOSIS — E66.9 OBESITY (BMI 30-39.9): ICD-10-CM

## 2023-03-24 PROCEDURE — 99211 OFF/OP EST MAY X REQ PHY/QHP: CPT | Performed by: INTERNAL MEDICINE

## 2023-03-24 PROCEDURE — 99214 OFFICE O/P EST MOD 30 MIN: CPT | Performed by: INTERNAL MEDICINE

## 2023-03-24 RX ORDER — LIOTHYRONINE SODIUM 5 UG/1
5 TABLET ORAL
Qty: 180 TABLET | Refills: 1 | Status: SHIPPED | OUTPATIENT
Start: 2023-03-24 | End: 2023-10-02

## 2023-03-24 RX ORDER — LEVOTHYROXINE SODIUM 112 MCG
112 TABLET ORAL
Qty: 90 TABLET | Refills: 1 | Status: SHIPPED | OUTPATIENT
Start: 2023-03-24 | End: 2023-10-10 | Stop reason: SDUPTHER

## 2023-03-24 ASSESSMENT — FIBROSIS 4 INDEX: FIB4 SCORE: 0.63

## 2023-03-24 NOTE — PROGRESS NOTES
Chief Complaint: Follow up for Primary Hypothyroidism secondary to Hashimoto's thyroiditis and history of nontoxic multinodular goiter.      HPI:     Pebbles Cosme is a 42 y.o. female here for follow up of Primary Hypothyroidism.      Comorbid issues include BRCA1 + status and triple negative breast cancer s/p bilateral mastectomy and TAHBSO    She has tried and failed generic Levothyroxine.  Because she had tried and failed monotherapy with T4 I placed on combination therapy with T4 and T3      Since last visit patient reports feeling poor.  She remains on Synthroid 112 MCG daily and Cytomel 5mcg daily  which has been her dose for the past 12 months.   She  denies taking any iron, calcium supplements or antacids.  She admits to missing doses.    She reports that her energy is low again and she is open to optimizing her dose of T3      Her TSH is 1.16 with a free T4 of 1.09 and free T3 of 2.75 on March 22, 2023 while on combination therapy with Synthroid 112 and liothyronine 5 mcg daily    Her vitamin D is 51 and the rest of her CMP is normal on March 2023        She has nontoxic multinodular goiter with initial diagnosis in October 2019 with a dominant 1 cm hypoechoic nodule in the left mid lobe and subcentimeter nodule in the right lower lobe.      She denies a family history of thyroid cancer and denies radiation exposure.    Follow-up thyroid ultrasound completed on September 20, 2020 showed stable left mid lobe nodule    Follow-up thyroid ultrasound on January 17, 2023 showed slight increase in the size of the left mid lobe hypoechoic solid nodule      She denies feeling any new lumps or swelling on her neck        Patient's medications, allergies, and social histories were reviewed and updated as appropriate.      ROS:     CONS:     No fever, no chills   EYES:     No diplopia, no blurry vision   CV:           No chest pain, no palpitations   PULM:     No SOB, no cough, no hemoptysis.   GI:             No nausea, no vomiting, no diarrhea, no constipation   ENDO:     No polyuria, no polydipsia, no heat intolerance, denies cold intolerance       Past Medical History:  Problem List:  2020-11: Personal history of breast cancer  2020-09: GERD (gastroesophageal reflux disease)  2020-09: Recurrent major depressive disorder, in full remission ([facility])  2020-03: Nontoxic multinodular goiter  2020-03: Hashimoto's thyroiditis  2020-01: Research study patient  2020-01: [facility]  2019-02: Impaired fasting glucose  2019-02: Morbid obesity ([facility])  2019-02: Weight gain  2019-01: Subclinical hypothyroidism  2019-01: Class 3 severe obesity without serious comorbidity in adult   ([facility])  2019-01: Fatigue  2019-01: Vitamin D deficiency  2018-06: BRCA gene mutation positive in female  2018-06: Breast cancer ([facility])  2018-06: Infected breast tissue expander ([facility])  2018-05: Acquired absence of bilateral breasts and nipples  2018-03: Situational anxiety  2017-08: Generalized anxiety disorder with panic attacks  2015-06: H/O LEEP  2015-06: Family hx of ovarian malignancy  2015-06: Decreased energy  2015-06: Insomnia  2015-06: Anxiety      Past Surgical History:  Past Surgical History:   Procedure Laterality Date    BREAST IMPLANT REMOVAL Right 6/18/2018    Procedure: BREAST EXPANDER  REMOVAL;  Surgeon: Niki Osuna M.D.;  Location: SURGERY [facility];  Service: Plastics    FLAP GRAFT Right 5/16/2018    Procedure: FLAP GRAFT-   ADJACENT TISSUE TRANSFER OR REARRANGEMENT, TRUNK;  Surgeon: Niki Osuna M.D.;  Location: SURGERY SAME DAY [facility];  Service: Plastics    OTHER  04/2018    bilateral mastectomy    OTHER  03/2018    lymph node dissection    OTHER  10/11/2017    port placement    ABDOMINAL HYSTERECTOMY TOTAL      OPEN REDUCTION      R foot    OTHER      wisdom teeth removed        Allergies:  Patient has no known allergies.     Social History:  Social History     Tobacco Use    Smoking status:  "Light Smoker     Packs/day: 0.03     Years: 0.00     Pack years: 0.00     Types: Cigarettes    Smokeless tobacco: Never    Tobacco comments:     rare/ once a week    Vaping Use    Vaping Use: Never used   Substance Use Topics    Alcohol use: Yes     Comment: ocassional    Drug use: No        Family History:   family history includes Cancer (age of onset: 45) in her mother; Heart Disease in her father; Hyperlipidemia in her father; Hypertension in her father.      PHYSICAL EXAM:   Vital signs: /80   Pulse 76   Ht 1.626 m (5' 4\")   Wt 80.3 kg (177 lb)   LMP 11/01/2017 (Approximate) Comment: last dose chemo 3/12/18.  SpO2 99%   BMI 30.38 kg/m²   GENERAL: Well-developed, well-nourished in no apparent distress.   EYE:  No ocular asymmetry, PERRLA  HENT: Pink, moist mucous membranes.    NECK: Thyroid is slightly enlarged and feels bosselated  CARDIOVASCULAR:  No murmurs  LUNGS: Clear breath sounds  ABDOMEN: Soft, nontender   EXTREMITIES: No clubbing, cyanosis, or edema.   NEUROLOGICAL: No gross focal motor abnormalities   LYMPH: No cervical adenopathy seen  SKIN: No rashes, lesions.       Labs:  Lab Results   Component Value Date/Time    SODIUM 140 03/22/2023 08:22 AM    POTASSIUM 4.4 03/22/2023 08:22 AM    CHLORIDE 106 03/22/2023 08:22 AM    CO2 25 03/22/2023 08:22 AM    ANION 9.0 03/22/2023 08:22 AM    GLUCOSE 85 03/22/2023 08:22 AM    BUN 23 (H) 03/22/2023 08:22 AM    CREATININE 0.69 03/22/2023 08:22 AM    CALCIUM 8.6 03/22/2023 08:22 AM    ASTSGOT 16 03/22/2023 08:22 AM    ALTSGPT 11 03/22/2023 08:22 AM    TBILIRUBIN 0.3 03/22/2023 08:22 AM    ALBUMIN 3.9 03/22/2023 08:22 AM    TOTPROTEIN 6.8 03/22/2023 08:22 AM    GLOBULIN 2.9 03/22/2023 08:22 AM    AGRATIO 1.3 03/22/2023 08:22 AM       Lab Results   Component Value Date/Time    SODIUM 134 (L) 02/24/2020 1117    POTASSIUM 3.7 02/24/2020 1117    CHLORIDE 101 02/24/2020 1117    CO2 23 02/24/2020 1117    GLUCOSE 110 (H) 02/24/2020 1117    BUN 16 02/24/2020 " 1117    CREATININE 0.85 02/24/2020 1117    CALCIUM 8.8 02/24/2020 1117    ANION 10.0 02/24/2020 1117       Lab Results   Component Value Date/Time    CHOLSTRLTOT 150 01/06/2017 1142    TRIGLYCERIDE 35 01/06/2017 1142    HDL 73 01/06/2017 1142    LDL 70 01/06/2017 1142       Lab Results   Component Value Date/Time    TSHULTRASEN 2.980 02/24/2020 1117     Lab Results   Component Value Date/Time    FREET4 1.03 12/03/2019 1120     Lab Results   Component Value Date/Time    FREET3 3.48 12/03/2019 1120     No results found for: THYSTIMIG    Lab Results   Component Value Date/Time    MICROSOMALA 15 08/13/2019 1216         Imaging: See ultrasound from September 2020 discussed and reviewed with the patient      ASSESSMENT/PLAN:     1. Subclinical hypothyroidism  Fairly stable  She now reports fatigue  Continue Synthroid 112 mcg daily  Adjust Cytomel to 5mcg daily twice a day  Reviewed proper administration of thyroid hormone  Patient should take thyroid hormone 30-60 minutes before breakfast on an empty stomach plain water and not take it together with food, iron, calcium, and antacids.  Iron, calcium, and antacids should be taken at least 4 hours apart from thyroid hormone.  Repeat TSH, free T4 and free T3 levels in 3 to 6 months    2. Hashimoto's thyroiditis  This is the etiology of her hypothyroidism    3. Nontoxic multinodular goiter  Stable  Reviewed thyroid ultrasound findings from January 2023  With patient I will schedule her for biopsy of the left mid lobe hypoechoic solid nodule in June 2023    4. Vitamin D deficiency  Stable   Vitamin D labs were reviewed with patient  Continue current supplements  Continue monitoring levels       Return in about 6 months (around 9/24/2023).      Thank you kindly for allowing me to participate in the thyroid care plan for this patient.    Seth Benavides MD, FACE, ECNU      CC:   Pcp Pt States None

## 2023-06-26 ENCOUNTER — PROCEDURE VISIT (OUTPATIENT)
Dept: ENDOCRINOLOGY | Facility: MEDICAL CENTER | Age: 43
End: 2023-06-26
Attending: INTERNAL MEDICINE
Payer: COMMERCIAL

## 2023-06-26 ENCOUNTER — HOSPITAL ENCOUNTER (OUTPATIENT)
Facility: MEDICAL CENTER | Age: 43
End: 2023-06-26
Attending: INTERNAL MEDICINE
Payer: COMMERCIAL

## 2023-06-26 DIAGNOSIS — E04.2 NONTOXIC MULTINODULAR GOITER: ICD-10-CM

## 2023-06-26 LAB — PATHOLOGY CONSULT NOTE: NORMAL

## 2023-06-26 PROCEDURE — 88173 CYTOPATH EVAL FNA REPORT: CPT

## 2023-06-26 PROCEDURE — 10005 FNA BX W/US GDN 1ST LES: CPT | Performed by: INTERNAL MEDICINE

## 2023-06-26 NOTE — PROGRESS NOTES
POC US guided FNA procedure was completed today for 1.3 cm LML solid nodule   Please see endocrinologist procedure note  Patient tolerated procedure well without complaints.  Patient was advised that she will be contacted regarding the results and next plan  Patient was advised to follow up as planned with labs prior     Seth Benavides M.D.

## 2023-10-02 DIAGNOSIS — E03.8 SUBCLINICAL HYPOTHYROIDISM: ICD-10-CM

## 2023-10-02 RX ORDER — LIOTHYRONINE SODIUM 5 UG/1
5 TABLET ORAL 2 TIMES DAILY
Qty: 180 TABLET | Refills: 1 | Status: SHIPPED | OUTPATIENT
Start: 2023-10-02 | End: 2023-10-10 | Stop reason: SDUPTHER

## 2023-10-03 ENCOUNTER — TELEPHONE (OUTPATIENT)
Dept: ENDOCRINOLOGY | Facility: MEDICAL CENTER | Age: 43
End: 2023-10-03
Payer: COMMERCIAL

## 2023-10-03 RX ORDER — VALACYCLOVIR HYDROCHLORIDE 1 G/1
1000 TABLET, FILM COATED ORAL 2 TIMES DAILY
COMMUNITY
Start: 2023-07-27

## 2023-10-03 RX ORDER — ESTRADIOL 2 MG/1
2 TABLET ORAL DAILY
COMMUNITY
Start: 2023-07-22

## 2023-10-03 NOTE — TELEPHONE ENCOUNTER
Unable to leave voicemail in regards to labs that need to be completed prior to appointment. If labs not completed by Thursday, appointment will be cancelled.

## 2023-10-06 ENCOUNTER — HOSPITAL ENCOUNTER (OUTPATIENT)
Dept: LAB | Facility: MEDICAL CENTER | Age: 43
End: 2023-10-06
Attending: INTERNAL MEDICINE
Payer: COMMERCIAL

## 2023-10-06 DIAGNOSIS — E55.9 VITAMIN D DEFICIENCY: ICD-10-CM

## 2023-10-06 DIAGNOSIS — E06.3 HASHIMOTO'S THYROIDITIS: ICD-10-CM

## 2023-10-06 DIAGNOSIS — E04.2 NONTOXIC MULTINODULAR GOITER: ICD-10-CM

## 2023-10-06 DIAGNOSIS — E03.8 SUBCLINICAL HYPOTHYROIDISM: ICD-10-CM

## 2023-10-06 LAB
25(OH)D3 SERPL-MCNC: 57 NG/ML (ref 30–100)
ALBUMIN SERPL BCP-MCNC: 4.5 G/DL (ref 3.2–4.9)
ALBUMIN/GLOB SERPL: 1.5 G/DL
ALP SERPL-CCNC: 69 U/L (ref 30–99)
ALT SERPL-CCNC: 19 U/L (ref 2–50)
ANION GAP SERPL CALC-SCNC: 11 MMOL/L (ref 7–16)
AST SERPL-CCNC: 19 U/L (ref 12–45)
BILIRUB SERPL-MCNC: <0.2 MG/DL (ref 0.1–1.5)
BUN SERPL-MCNC: 13 MG/DL (ref 8–22)
CALCIUM ALBUM COR SERPL-MCNC: 8.7 MG/DL (ref 8.5–10.5)
CALCIUM SERPL-MCNC: 9.1 MG/DL (ref 8.5–10.5)
CHLORIDE SERPL-SCNC: 102 MMOL/L (ref 96–112)
CO2 SERPL-SCNC: 24 MMOL/L (ref 20–33)
CREAT SERPL-MCNC: 0.68 MG/DL (ref 0.5–1.4)
GFR SERPLBLD CREATININE-BSD FMLA CKD-EPI: 109 ML/MIN/1.73 M 2
GLOBULIN SER CALC-MCNC: 3 G/DL (ref 1.9–3.5)
GLUCOSE SERPL-MCNC: 86 MG/DL (ref 65–99)
POTASSIUM SERPL-SCNC: 4.1 MMOL/L (ref 3.6–5.5)
PROT SERPL-MCNC: 7.5 G/DL (ref 6–8.2)
SODIUM SERPL-SCNC: 137 MMOL/L (ref 135–145)
T3FREE SERPL-MCNC: 3.19 PG/ML (ref 2–4.4)
T4 FREE SERPL-MCNC: 1.72 NG/DL (ref 0.93–1.7)
TSH SERPL DL<=0.005 MIU/L-ACNC: 0.69 UIU/ML (ref 0.38–5.33)

## 2023-10-06 PROCEDURE — 36415 COLL VENOUS BLD VENIPUNCTURE: CPT

## 2023-10-06 PROCEDURE — 80053 COMPREHEN METABOLIC PANEL: CPT

## 2023-10-06 PROCEDURE — 82306 VITAMIN D 25 HYDROXY: CPT

## 2023-10-06 PROCEDURE — 84443 ASSAY THYROID STIM HORMONE: CPT

## 2023-10-06 PROCEDURE — 84481 FREE ASSAY (FT-3): CPT

## 2023-10-06 PROCEDURE — 84439 ASSAY OF FREE THYROXINE: CPT

## 2023-10-10 ENCOUNTER — OFFICE VISIT (OUTPATIENT)
Dept: ENDOCRINOLOGY | Facility: MEDICAL CENTER | Age: 43
End: 2023-10-10
Attending: INTERNAL MEDICINE
Payer: COMMERCIAL

## 2023-10-10 VITALS
OXYGEN SATURATION: 99 % | HEART RATE: 98 BPM | WEIGHT: 184 LBS | HEIGHT: 64 IN | SYSTOLIC BLOOD PRESSURE: 118 MMHG | DIASTOLIC BLOOD PRESSURE: 76 MMHG | BODY MASS INDEX: 31.41 KG/M2

## 2023-10-10 DIAGNOSIS — E66.9 OBESITY (BMI 30-39.9): ICD-10-CM

## 2023-10-10 DIAGNOSIS — E55.9 VITAMIN D DEFICIENCY: ICD-10-CM

## 2023-10-10 DIAGNOSIS — E06.3 HASHIMOTO'S THYROIDITIS: ICD-10-CM

## 2023-10-10 DIAGNOSIS — E04.2 NONTOXIC MULTINODULAR GOITER: ICD-10-CM

## 2023-10-10 DIAGNOSIS — E03.8 SUBCLINICAL HYPOTHYROIDISM: ICD-10-CM

## 2023-10-10 PROCEDURE — 99215 OFFICE O/P EST HI 40 MIN: CPT | Performed by: INTERNAL MEDICINE

## 2023-10-10 PROCEDURE — 3078F DIAST BP <80 MM HG: CPT | Performed by: INTERNAL MEDICINE

## 2023-10-10 PROCEDURE — 3074F SYST BP LT 130 MM HG: CPT | Performed by: INTERNAL MEDICINE

## 2023-10-10 PROCEDURE — 99211 OFF/OP EST MAY X REQ PHY/QHP: CPT | Performed by: INTERNAL MEDICINE

## 2023-10-10 RX ORDER — PHENTERMINE HYDROCHLORIDE 37.5 MG/1
37.5 CAPSULE ORAL EVERY MORNING
Qty: 30 CAPSULE | Refills: 5 | Status: SHIPPED | OUTPATIENT
Start: 2023-10-10 | End: 2023-11-09

## 2023-10-10 RX ORDER — LIOTHYRONINE SODIUM 5 UG/1
5 TABLET ORAL 2 TIMES DAILY
Qty: 180 TABLET | Refills: 1 | Status: SHIPPED | OUTPATIENT
Start: 2023-10-10

## 2023-10-10 RX ORDER — LEVOTHYROXINE SODIUM 112 MCG
112 TABLET ORAL
Qty: 90 TABLET | Refills: 1 | Status: SHIPPED | OUTPATIENT
Start: 2023-10-10 | End: 2023-11-08

## 2023-10-10 ASSESSMENT — FIBROSIS 4 INDEX: FIB4 SCORE: 0.58

## 2023-10-10 NOTE — PROGRESS NOTES
Chief Complaint: Follow up for Primary Hypothyroidism secondary to Hashimoto's thyroiditis and history of nontoxic multinodular goiter.      HPI:     Pebbles Cosme is a 43 y.o. female here for follow up of Primary Hypothyroidism.      Comorbid issues include BRCA1 + status and triple negative breast cancer s/p bilateral mastectomy and TAHBSO    She has tried and failed generic Levothyroxine.  Because she had tried and failed monotherapy with T4,   I placed on combination therapy with T4 and T3      Since last visit patient reports feeling better.  She remains on Synthroid 112 MCG daily and Cytomel 5mcg 2 x daily  which has been her dose for the past 6 months.   She  denies taking any iron, calcium supplements or antacids.  She reports good compliance    She reports her energy levels are better in the afternoon since we increased her Cytomel  She denies fatigue  She reports difficulty with losing weight  She just came back from a trip in [facility]       Latest Reference Range & Units 10/06/23 14:42   25-Hydroxy   Vitamin D 25 30 - 100 ng/mL 57   TSH 0.380 - 5.330 uIU/mL 0.690   Free T-4 0.93 - 1.70 ng/dL 1.72 (H)   T3,Free 2.00 - 4.40 pg/mL 3.19         She has nontoxic multinodular goiter with initial diagnosis in October 2019 with a dominant 1 cm hypoechoic nodule in the left mid lobe and subcentimeter nodule in the right lower lobe.  She denies a family history of thyroid cancer and denies radiation exposure.    Follow-up thyroid ultrasound completed on September 20, 2020 showed stable left mid lobe nodule    Follow-up thyroid ultrasound on January 17, 2023 showed slight increase in the size of the left mid lobe hypoechoic solid nodule  FNA by radiology on 6/2023 for the LML nodule came back as benign       She denies feeling any new lumps or swelling on her neck  She denies anterior neck compressive symptoms         She does have obesity with a BMI of 31  I previously ordered a [place] saliva cortisol test x 3  to screen for hypercortisolism but she has not been able to complete this        Patient's medications, allergies, and social histories were reviewed and updated as appropriate.      ROS:     CONS:     No fever, no chills   EYES:     No diplopia, no blurry vision   CV:           No chest pain, no palpitations   PULM:     No SOB, no cough, no hemoptysis.   GI:            No nausea, no vomiting, no diarrhea, no constipation   ENDO:     No polyuria, no polydipsia, no heat intolerance, denies cold intolerance       Past Medical History:  Problem List:  2020-11: Personal history of breast cancer  2020-09: GERD (gastroesophageal reflux disease)  2020-09: Recurrent major depressive disorder, in full remission ([facility])  2020-03: Nontoxic multinodular goiter  2020-03: Hashimoto's thyroiditis  2020-01: Research study patient  2020-01: [facility]  2019-02: Impaired fasting glucose  2019-02: Morbid obesity ([facility])  2019-02: Weight gain  2019-01: Subclinical hypothyroidism  2019-01: Class 3 severe obesity without serious comorbidity in adult   ([facility])  2019-01: Fatigue  2019-01: Vitamin D deficiency  2018-06: BRCA gene mutation positive in female  2018-06: Breast cancer ([facility])  2018-06: Infected breast tissue expander ([facility])  2018-05: Acquired absence of bilateral breasts and nipples  2018-03: Situational anxiety  2017-08: Generalized anxiety disorder with panic attacks  2015-06: H/O LEEP  2015-06: Family hx of ovarian malignancy  2015-06: Decreased energy  2015-06: Insomnia  2015-06: Anxiety      Past Surgical History:  Past Surgical History:   Procedure Laterality Date    BREAST IMPLANT REMOVAL Right 6/18/2018    Procedure: BREAST EXPANDER  REMOVAL;  Surgeon: Niki Osuna M.D.;  Location: SURGERY [facility];  Service: Plastics    FLAP GRAFT Right 5/16/2018    Procedure: FLAP GRAFT-   ADJACENT TISSUE TRANSFER OR REARRANGEMENT, TRUNK;  Surgeon: Niki Osuna M.D.;  Location: SURGERY SAME [facility]  "[facility];  Service: Plastics    OTHER  04/2018    bilateral mastectomy    OTHER  03/2018    lymph node dissection    OTHER  10/11/2017    port placement    ABDOMINAL HYSTERECTOMY TOTAL      OPEN REDUCTION      R foot    OTHER      wisdom teeth removed        Allergies:  Patient has no known allergies.     Social History:  Social History     Tobacco Use    Smoking status: Light Smoker     Current packs/day: 0.03     Types: Cigarettes    Smokeless tobacco: Never    Tobacco comments:     rare/ once a week    Vaping Use    Vaping Use: Never used   Substance Use Topics    Alcohol use: Yes     Comment: ocassional    Drug use: No        Family History:   family history includes Cancer (age of onset: 45) in her mother; Heart Disease in her father; Hyperlipidemia in her father; Hypertension in her father.      PHYSICAL EXAM:   Vital signs: /76 (BP Location: Right arm, Patient Position: Sitting, BP Cuff Size: Adult)   Pulse 98   Ht 1.626 m (5' 4\")   Wt 83.5 kg (184 lb)   LMP 11/01/2017 (Approximate) Comment: last dose chemo 3/12/18.  SpO2 99%   BMI 31.58 kg/m²   GENERAL: Well-developed, well-nourished in no apparent distress.   EYE:  No ocular asymmetry, PERRLA  HENT: Pink, moist mucous membranes.    NECK: Thyroid is slightly enlarged and feels bosselated  CARDIOVASCULAR:  No murmurs  LUNGS: Clear breath sounds  ABDOMEN: Soft, nontender   EXTREMITIES: No clubbing, cyanosis, or edema.   NEUROLOGICAL: No gross focal motor abnormalities   LYMPH: No cervical adenopathy seen  SKIN: No rashes, lesions.       Labs:  Lab Results   Component Value Date/Time    SODIUM 137 10/06/2023 02:42 PM    POTASSIUM 4.1 10/06/2023 02:42 PM    CHLORIDE 102 10/06/2023 02:42 PM    CO2 24 10/06/2023 02:42 PM    ANION 11.0 10/06/2023 02:42 PM    GLUCOSE 86 10/06/2023 02:42 PM    BUN 13 10/06/2023 02:42 PM    CREATININE 0.68 10/06/2023 02:42 PM    CALCIUM 9.1 10/06/2023 02:42 PM    ASTSGOT 19 10/06/2023 02:42 PM    ALTSGPT 19 10/06/2023 02:42 PM "    TBILIRUBIN <0.2 10/06/2023 02:42 PM    ALBUMIN 4.5 10/06/2023 02:42 PM    TOTPROTEIN 7.5 10/06/2023 02:42 PM    GLOBULIN 3.0 10/06/2023 02:42 PM    AGRATIO 1.5 10/06/2023 02:42 PM       Lab Results   Component Value Date/Time    SODIUM 134 (L) 02/24/2020 1117    POTASSIUM 3.7 02/24/2020 1117    CHLORIDE 101 02/24/2020 1117    CO2 23 02/24/2020 1117    GLUCOSE 110 (H) 02/24/2020 1117    BUN 16 02/24/2020 1117    CREATININE 0.85 02/24/2020 1117    CALCIUM 8.8 02/24/2020 1117    ANION 10.0 02/24/2020 1117       Lab Results   Component Value Date/Time    CHOLSTRLTOT 150 01/06/2017 1142    TRIGLYCERIDE 35 01/06/2017 1142    HDL 73 01/06/2017 1142    LDL 70 01/06/2017 1142       Lab Results   Component Value Date/Time    TSHULTRASEN 2.980 02/24/2020 1117     Lab Results   Component Value Date/Time    FREET4 1.03 12/03/2019 1120     Lab Results   Component Value Date/Time    FREET3 3.48 12/03/2019 1120     No results found for: THYSTIMIG    Lab Results   Component Value Date/Time    MICROSOMALA 15 08/13/2019 1216         Imaging: See ultrasound from September 2020 discussed and reviewed with the patient      ASSESSMENT/PLAN:     1. Subclinical hypothyroidism  Well-controlled  Her energy is improved  Continue Synthroid 112 mcg daily  Continue Cytomel to 5mcg daily twice a day  Reviewed proper administration of thyroid hormone  Patient should take thyroid hormone 30-60 minutes before breakfast on an empty stomach plain water and not take it together with food, iron, calcium, and antacids.  Iron, calcium, and antacids should be taken at least 4 hours apart from thyroid hormone.  Repeat TSH, free T4 and free T3 levels in 3 to 6 months    2. Hashimoto's thyroiditis  This is the etiology of her hypothyroidism    3. Nontoxic multinodular goiter  Stable  Reviewed thyroid ultrasound findings from January 2023  Biopsy was benign  Recommend observation   Repeat US in 6 mos    4. Vitamin D deficiency  Stable   Vitamin D labs were  reviewed with patient  Continue current supplements  Continue monitoring levels       5. Obesity (BMI 30-39.9)  Stable recommend that she complete the midnight saliva cortisol test to rule hypercortisolism  She has tried and failed diet and exercise for at least 6 months  I am prescribing her phentermine  Reviewed side effects and proper administration medication  Reassess weight in 6 months      Return in about 6 months (around 4/10/2024).    Total time spent on day of service was over 60 minutes which included obtaining a detailed history and physical exam, ordering labs, coordinating care and scheduling future follow-up    Thank you kindly for allowing me to participate in the thyroid care plan for this patient.    Seth Benavides MD, FACE, ECNU      CC:   Pcp Pt States None

## 2023-11-08 DIAGNOSIS — E03.8 SUBCLINICAL HYPOTHYROIDISM: ICD-10-CM

## 2023-11-08 RX ORDER — LEVOTHYROXINE SODIUM 112 MCG
TABLET ORAL
Qty: 90 TABLET | Refills: 1 | Status: SHIPPED | OUTPATIENT
Start: 2023-11-08 | End: 2023-11-10

## 2023-11-10 DIAGNOSIS — E03.8 SUBCLINICAL HYPOTHYROIDISM: ICD-10-CM

## 2023-11-10 RX ORDER — LEVOTHYROXINE SODIUM 112 MCG
TABLET ORAL
Qty: 90 TABLET | Refills: 1 | Status: SHIPPED | OUTPATIENT
Start: 2023-11-10

## 2023-12-01 ENCOUNTER — OFFICE VISIT (OUTPATIENT)
Dept: URGENT CARE | Facility: PHYSICIAN GROUP | Age: 43
End: 2023-12-01
Payer: COMMERCIAL

## 2023-12-01 VITALS
OXYGEN SATURATION: 98 % | HEART RATE: 77 BPM | WEIGHT: 175 LBS | TEMPERATURE: 98.6 F | DIASTOLIC BLOOD PRESSURE: 74 MMHG | BODY MASS INDEX: 29.88 KG/M2 | HEIGHT: 64 IN | SYSTOLIC BLOOD PRESSURE: 122 MMHG

## 2023-12-01 DIAGNOSIS — J34.89 INFECTION OF NOSE: ICD-10-CM

## 2023-12-01 PROCEDURE — 3074F SYST BP LT 130 MM HG: CPT | Performed by: PHYSICIAN ASSISTANT

## 2023-12-01 PROCEDURE — 99213 OFFICE O/P EST LOW 20 MIN: CPT | Performed by: PHYSICIAN ASSISTANT

## 2023-12-01 PROCEDURE — 3078F DIAST BP <80 MM HG: CPT | Performed by: PHYSICIAN ASSISTANT

## 2023-12-01 RX ORDER — DOXYCYCLINE HYCLATE 100 MG
100 TABLET ORAL 2 TIMES DAILY
Qty: 10 TABLET | Refills: 0 | Status: SHIPPED | OUTPATIENT
Start: 2023-12-01 | End: 2023-12-06

## 2023-12-01 ASSESSMENT — FIBROSIS 4 INDEX: FIB4 SCORE: 0.58

## 2023-12-01 ASSESSMENT — ENCOUNTER SYMPTOMS
CONSTITUTIONAL NEGATIVE: 1
EYES NEGATIVE: 1

## 2023-12-01 NOTE — PROGRESS NOTES
"  Subjective:     Pebbles Cosme  is a 43 y.o. female who presents for Runny Nose (Possible staph, bleeding, painful)       She presents today with concern for possible infection of the nasal structures.  She is experiencing a bump on the inside of the right nare as well as nasal pain.  Has had previous diagnosis of nasal staph infection that was treated with antibiotics in 2012.  She denies any fevers, no redness of the skin of the face.  No eye pain, no change in vision or blurry vision.       Review of Systems   Constitutional: Negative.    HENT:          Infection on skin inside nose   Eyes: Negative.       No Known Allergies  Past Medical History:   Diagnosis Date    Anxiety     Cancer (HCC) 11/2017    breast cancer    Pain     edwar drains in place bilateral chest    Psychiatric problem     anxiety        Objective:   /74 (BP Location: Right arm, Patient Position: Sitting, BP Cuff Size: Adult)   Pulse 77   Temp 37 °C (98.6 °F) (Temporal)   Ht 1.626 m (5' 4\")   Wt 79.4 kg (175 lb)   LMP 11/01/2017 (Approximate) Comment: last dose chemo 3/12/18.  SpO2 98%   BMI 30.04 kg/m²   Physical Exam  Vitals and nursing note reviewed.   Constitutional:       General: She is not in acute distress.     Appearance: She is not ill-appearing or toxic-appearing.   HENT:      Head: Normocephalic.      Nose: No congestion or rhinorrhea.      Comments: Internal examination of the right nare does reveal stool present on the nasal septum with surrounding erythema, examination of the right nare does reveal erythema of the septum.  No tenderness on the bridge of the nose, external cartilage tissue of the nose.  No nasal erythema on the external skin.  Eyes:      General: No scleral icterus.     Conjunctiva/sclera: Conjunctivae normal.   Pulmonary:      Effort: Pulmonary effort is normal. No respiratory distress.      Breath sounds: No stridor.   Musculoskeletal:      Cervical back: Neck supple.   Neurological:      " Mental Status: She is alert and oriented to person, place, and time.   Psychiatric:         Mood and Affect: Mood normal.         Behavior: Behavior normal.         Thought Content: Thought content normal.         Judgment: Judgment normal.             Diagnostic testing: None    Assessment/Plan:     Encounter Diagnoses   Name Primary?    Infection of nose           Plan for care for today's complaint includes starting the patient on doxycycline for nasal infection on the septum.  Signs of facial cellulitis.  Continue to monitor symptoms and return to urgent care or follow-up with primary care provider if symptoms remain ongoing.  Follow-up in the emergency department if symptoms become severe, ER precautions discussed in office today..  Prescription for doxycycline provided.    See AVS Instructions below for written guidance provided to patient on after-visit management and care in addition to our verbal discussion during the visit.    Please note that this dictation was created using voice recognition software. I have attempted to correct all errors, but there may be sound-alike, spelling, grammar and possibly content errors that I did not discover before finalizing the note.    Silvestre Nash PA-C

## 2024-04-05 ENCOUNTER — TELEPHONE (OUTPATIENT)
Dept: ENDOCRINOLOGY | Facility: MEDICAL CENTER | Age: 44
End: 2024-04-05
Payer: COMMERCIAL

## 2024-04-05 NOTE — TELEPHONE ENCOUNTER
Patient's work schedu;le changed and is unable to get US done with our office. She requested a referral to [facility] for her Thyroid US. Thank you!

## 2024-04-08 ENCOUNTER — APPOINTMENT (OUTPATIENT)
Dept: ENDOCRINOLOGY | Facility: MEDICAL CENTER | Age: 44
End: 2024-04-08
Attending: INTERNAL MEDICINE
Payer: COMMERCIAL

## 2024-04-08 DIAGNOSIS — E04.2 NONTOXIC MULTINODULAR GOITER: ICD-10-CM

## 2024-04-25 DIAGNOSIS — E03.8 SUBCLINICAL HYPOTHYROIDISM: ICD-10-CM

## 2024-04-25 RX ORDER — LEVOTHYROXINE SODIUM 112 MCG
112 TABLET ORAL
Qty: 90 TABLET | Refills: 0 | Status: SHIPPED | OUTPATIENT
Start: 2024-04-25

## 2024-04-25 RX ORDER — LIOTHYRONINE SODIUM 5 UG/1
5 TABLET ORAL 2 TIMES DAILY
Qty: 180 TABLET | Refills: 0 | Status: SHIPPED | OUTPATIENT
Start: 2024-04-25

## 2024-05-12 ENCOUNTER — APPOINTMENT (OUTPATIENT)
Dept: RADIOLOGY | Facility: MEDICAL CENTER | Age: 44
End: 2024-05-12
Attending: INTERNAL MEDICINE
Payer: COMMERCIAL

## 2024-05-14 ENCOUNTER — HOSPITAL ENCOUNTER (OUTPATIENT)
Dept: RADIOLOGY | Facility: MEDICAL CENTER | Age: 44
End: 2024-05-14
Attending: INTERNAL MEDICINE
Payer: COMMERCIAL

## 2024-05-14 DIAGNOSIS — E04.2 NONTOXIC MULTINODULAR GOITER: ICD-10-CM

## 2024-05-25 ENCOUNTER — HOSPITAL ENCOUNTER (OUTPATIENT)
Dept: LAB | Facility: MEDICAL CENTER | Age: 44
End: 2024-05-25
Attending: INTERNAL MEDICINE
Payer: COMMERCIAL

## 2024-05-25 ENCOUNTER — HOSPITAL ENCOUNTER (OUTPATIENT)
Dept: LAB | Facility: MEDICAL CENTER | Age: 44
End: 2024-05-25
Payer: COMMERCIAL

## 2024-05-25 DIAGNOSIS — E06.3 HASHIMOTO'S THYROIDITIS: ICD-10-CM

## 2024-05-25 DIAGNOSIS — E66.9 OBESITY (BMI 30-39.9): ICD-10-CM

## 2024-05-25 DIAGNOSIS — E04.2 NONTOXIC MULTINODULAR GOITER: ICD-10-CM

## 2024-05-25 DIAGNOSIS — E55.9 VITAMIN D DEFICIENCY: ICD-10-CM

## 2024-05-25 DIAGNOSIS — E03.8 SUBCLINICAL HYPOTHYROIDISM: ICD-10-CM

## 2024-05-25 LAB
25(OH)D3 SERPL-MCNC: 42 NG/ML (ref 30–100)
ALBUMIN SERPL BCP-MCNC: 4 G/DL (ref 3.2–4.9)
ALBUMIN/GLOB SERPL: 2.1 G/DL
ALP SERPL-CCNC: 61 U/L (ref 30–99)
ALT SERPL-CCNC: 14 U/L (ref 2–50)
ANION GAP SERPL CALC-SCNC: 11 MMOL/L (ref 7–16)
AST SERPL-CCNC: 20 U/L (ref 12–45)
BASOPHILS # BLD AUTO: 0.5 % (ref 0–1.8)
BASOPHILS # BLD: 0.04 K/UL (ref 0–0.12)
BILIRUB SERPL-MCNC: <0.2 MG/DL (ref 0.1–1.5)
BUN SERPL-MCNC: 23 MG/DL (ref 8–22)
CALCIUM ALBUM COR SERPL-MCNC: 8.8 MG/DL (ref 8.5–10.5)
CALCIUM SERPL-MCNC: 8.8 MG/DL (ref 8.5–10.5)
CHLORIDE SERPL-SCNC: 104 MMOL/L (ref 96–112)
CHOLEST SERPL-MCNC: 175 MG/DL (ref 100–199)
CO2 SERPL-SCNC: 24 MMOL/L (ref 20–33)
CREAT SERPL-MCNC: 0.94 MG/DL (ref 0.5–1.4)
EOSINOPHIL # BLD AUTO: 0.14 K/UL (ref 0–0.51)
EOSINOPHIL NFR BLD: 1.8 % (ref 0–6.9)
ERYTHROCYTE [DISTWIDTH] IN BLOOD BY AUTOMATED COUNT: 42.5 FL (ref 35.9–50)
EST. AVERAGE GLUCOSE BLD GHB EST-MCNC: 114 MG/DL
FASTING STATUS PATIENT QL REPORTED: NORMAL
GFR SERPLBLD CREATININE-BSD FMLA CKD-EPI: 76 ML/MIN/1.73 M 2
GLOBULIN SER CALC-MCNC: 1.9 G/DL (ref 1.9–3.5)
GLUCOSE SERPL-MCNC: 97 MG/DL (ref 65–99)
HBA1C MFR BLD: 5.6 % (ref 4–5.6)
HCT VFR BLD AUTO: 40.7 % (ref 37–47)
HDLC SERPL-MCNC: 60 MG/DL
HGB BLD-MCNC: 13.6 G/DL (ref 12–16)
IMM GRANULOCYTES # BLD AUTO: 0.02 K/UL (ref 0–0.11)
IMM GRANULOCYTES NFR BLD AUTO: 0.3 % (ref 0–0.9)
LDLC SERPL CALC-MCNC: 96 MG/DL
LYMPHOCYTES # BLD AUTO: 3 K/UL (ref 1–4.8)
LYMPHOCYTES NFR BLD: 38.9 % (ref 22–41)
MCH RBC QN AUTO: 32.1 PG (ref 27–33)
MCHC RBC AUTO-ENTMCNC: 33.4 G/DL (ref 32.2–35.5)
MCV RBC AUTO: 96 FL (ref 81.4–97.8)
MONOCYTES # BLD AUTO: 0.49 K/UL (ref 0–0.85)
MONOCYTES NFR BLD AUTO: 6.3 % (ref 0–13.4)
NEUTROPHILS # BLD AUTO: 4.03 K/UL (ref 1.82–7.42)
NEUTROPHILS NFR BLD: 52.2 % (ref 44–72)
NRBC # BLD AUTO: 0 K/UL
NRBC BLD-RTO: 0 /100 WBC (ref 0–0.2)
PLATELET # BLD AUTO: 333 K/UL (ref 164–446)
PMV BLD AUTO: 9.5 FL (ref 9–12.9)
POTASSIUM SERPL-SCNC: 5 MMOL/L (ref 3.6–5.5)
PROT SERPL-MCNC: 5.9 G/DL (ref 6–8.2)
RBC # BLD AUTO: 4.24 M/UL (ref 4.2–5.4)
SODIUM SERPL-SCNC: 139 MMOL/L (ref 135–145)
T3FREE SERPL-MCNC: 2.79 PG/ML (ref 2–4.4)
T4 FREE SERPL-MCNC: 1.08 NG/DL (ref 0.93–1.7)
TRIGL SERPL-MCNC: 93 MG/DL (ref 0–149)
TSH SERPL DL<=0.005 MIU/L-ACNC: 0.81 UIU/ML (ref 0.38–5.33)
WBC # BLD AUTO: 7.7 K/UL (ref 4.8–10.8)

## 2024-07-26 DIAGNOSIS — E03.8 SUBCLINICAL HYPOTHYROIDISM: ICD-10-CM

## 2024-07-27 ENCOUNTER — APPOINTMENT (OUTPATIENT)
Dept: RADIOLOGY | Facility: MEDICAL CENTER | Age: 44
End: 2024-07-27
Attending: STUDENT IN AN ORGANIZED HEALTH CARE EDUCATION/TRAINING PROGRAM
Payer: COMMERCIAL

## 2024-07-27 ENCOUNTER — PHARMACY VISIT (OUTPATIENT)
Dept: PHARMACY | Facility: MEDICAL CENTER | Age: 44
End: 2024-07-27
Payer: COMMERCIAL

## 2024-07-27 ENCOUNTER — HOSPITAL ENCOUNTER (EMERGENCY)
Facility: MEDICAL CENTER | Age: 44
End: 2024-07-27
Attending: STUDENT IN AN ORGANIZED HEALTH CARE EDUCATION/TRAINING PROGRAM
Payer: COMMERCIAL

## 2024-07-27 VITALS
HEIGHT: 64 IN | SYSTOLIC BLOOD PRESSURE: 158 MMHG | WEIGHT: 186.07 LBS | BODY MASS INDEX: 31.77 KG/M2 | OXYGEN SATURATION: 98 % | RESPIRATION RATE: 18 BRPM | TEMPERATURE: 96.8 F | DIASTOLIC BLOOD PRESSURE: 99 MMHG | HEART RATE: 65 BPM

## 2024-07-27 DIAGNOSIS — G89.18 POST-OP PAIN: ICD-10-CM

## 2024-07-27 LAB
ANION GAP SERPL CALC-SCNC: 13 MMOL/L (ref 7–16)
BASOPHILS # BLD AUTO: 0.4 % (ref 0–1.8)
BASOPHILS # BLD: 0.03 K/UL (ref 0–0.12)
BUN SERPL-MCNC: 12 MG/DL (ref 8–22)
CALCIUM SERPL-MCNC: 9.6 MG/DL (ref 8.5–10.5)
CHLORIDE SERPL-SCNC: 104 MMOL/L (ref 96–112)
CO2 SERPL-SCNC: 23 MMOL/L (ref 20–33)
CREAT SERPL-MCNC: 0.78 MG/DL (ref 0.5–1.4)
EOSINOPHIL # BLD AUTO: 0.11 K/UL (ref 0–0.51)
EOSINOPHIL NFR BLD: 1.3 % (ref 0–6.9)
ERYTHROCYTE [DISTWIDTH] IN BLOOD BY AUTOMATED COUNT: 40 FL (ref 35.9–50)
GFR SERPLBLD CREATININE-BSD FMLA CKD-EPI: 95 ML/MIN/1.73 M 2
GLUCOSE SERPL-MCNC: 89 MG/DL (ref 65–99)
HCG SERPL QL: NEGATIVE
HCT VFR BLD AUTO: 42.1 % (ref 37–47)
HGB BLD-MCNC: 14.2 G/DL (ref 12–16)
IMM GRANULOCYTES # BLD AUTO: 0.04 K/UL (ref 0–0.11)
IMM GRANULOCYTES NFR BLD AUTO: 0.5 % (ref 0–0.9)
LYMPHOCYTES # BLD AUTO: 3.2 K/UL (ref 1–4.8)
LYMPHOCYTES NFR BLD: 38 % (ref 22–41)
MCH RBC QN AUTO: 31.5 PG (ref 27–33)
MCHC RBC AUTO-ENTMCNC: 33.7 G/DL (ref 32.2–35.5)
MCV RBC AUTO: 93.3 FL (ref 81.4–97.8)
MONOCYTES # BLD AUTO: 0.52 K/UL (ref 0–0.85)
MONOCYTES NFR BLD AUTO: 6.2 % (ref 0–13.4)
NEUTROPHILS # BLD AUTO: 4.51 K/UL (ref 1.82–7.42)
NEUTROPHILS NFR BLD: 53.6 % (ref 44–72)
NRBC # BLD AUTO: 0 K/UL
NRBC BLD-RTO: 0 /100 WBC (ref 0–0.2)
PLATELET # BLD AUTO: 381 K/UL (ref 164–446)
PMV BLD AUTO: 9 FL (ref 9–12.9)
POTASSIUM SERPL-SCNC: 4.4 MMOL/L (ref 3.6–5.5)
RBC # BLD AUTO: 4.51 M/UL (ref 4.2–5.4)
SODIUM SERPL-SCNC: 140 MMOL/L (ref 135–145)
WBC # BLD AUTO: 8.4 K/UL (ref 4.8–10.8)

## 2024-07-27 PROCEDURE — 700111 HCHG RX REV CODE 636 W/ 250 OVERRIDE (IP): Mod: JZ | Performed by: STUDENT IN AN ORGANIZED HEALTH CARE EDUCATION/TRAINING PROGRAM

## 2024-07-27 PROCEDURE — RXMED WILLOW AMBULATORY MEDICATION CHARGE: Performed by: STUDENT IN AN ORGANIZED HEALTH CARE EDUCATION/TRAINING PROGRAM

## 2024-07-27 PROCEDURE — 85025 COMPLETE CBC W/AUTO DIFF WBC: CPT

## 2024-07-27 PROCEDURE — 700117 HCHG RX CONTRAST REV CODE 255: Performed by: STUDENT IN AN ORGANIZED HEALTH CARE EDUCATION/TRAINING PROGRAM

## 2024-07-27 PROCEDURE — 96375 TX/PRO/DX INJ NEW DRUG ADDON: CPT

## 2024-07-27 PROCEDURE — 80048 BASIC METABOLIC PNL TOTAL CA: CPT

## 2024-07-27 PROCEDURE — 70491 CT SOFT TISSUE NECK W/DYE: CPT

## 2024-07-27 PROCEDURE — A9270 NON-COVERED ITEM OR SERVICE: HCPCS | Performed by: STUDENT IN AN ORGANIZED HEALTH CARE EDUCATION/TRAINING PROGRAM

## 2024-07-27 PROCEDURE — 84703 CHORIONIC GONADOTROPIN ASSAY: CPT

## 2024-07-27 PROCEDURE — 36415 COLL VENOUS BLD VENIPUNCTURE: CPT

## 2024-07-27 PROCEDURE — 99284 EMERGENCY DEPT VISIT MOD MDM: CPT

## 2024-07-27 PROCEDURE — 700102 HCHG RX REV CODE 250 W/ 637 OVERRIDE(OP): Performed by: STUDENT IN AN ORGANIZED HEALTH CARE EDUCATION/TRAINING PROGRAM

## 2024-07-27 PROCEDURE — 96374 THER/PROPH/DIAG INJ IV PUSH: CPT

## 2024-07-27 PROCEDURE — 700105 HCHG RX REV CODE 258: Performed by: STUDENT IN AN ORGANIZED HEALTH CARE EDUCATION/TRAINING PROGRAM

## 2024-07-27 RX ORDER — SODIUM CHLORIDE, SODIUM LACTATE, POTASSIUM CHLORIDE, CALCIUM CHLORIDE 600; 310; 30; 20 MG/100ML; MG/100ML; MG/100ML; MG/100ML
1000 INJECTION, SOLUTION INTRAVENOUS ONCE
Status: COMPLETED | OUTPATIENT
Start: 2024-07-27 | End: 2024-07-27

## 2024-07-27 RX ORDER — MORPHINE SULFATE 4 MG/ML
4 INJECTION INTRAVENOUS ONCE
Status: COMPLETED | OUTPATIENT
Start: 2024-07-27 | End: 2024-07-27

## 2024-07-27 RX ORDER — KETOROLAC TROMETHAMINE 15 MG/ML
15 INJECTION, SOLUTION INTRAMUSCULAR; INTRAVENOUS ONCE
Status: COMPLETED | OUTPATIENT
Start: 2024-07-27 | End: 2024-07-27

## 2024-07-27 RX ORDER — DEXAMETHASONE SODIUM PHOSPHATE 4 MG/ML
16 INJECTION, SOLUTION INTRA-ARTICULAR; INTRALESIONAL; INTRAMUSCULAR; INTRAVENOUS; SOFT TISSUE ONCE
Status: COMPLETED | OUTPATIENT
Start: 2024-07-27 | End: 2024-07-27

## 2024-07-27 RX ORDER — KETOROLAC TROMETHAMINE 10 MG/1
10 TABLET, FILM COATED ORAL EVERY 6 HOURS PRN
Qty: 20 TABLET | Refills: 0 | Status: SHIPPED | OUTPATIENT
Start: 2024-07-27 | End: 2024-08-01

## 2024-07-27 RX ORDER — HYDROCODONE BITARTRATE AND ACETAMINOPHEN 5; 325 MG/1; MG/1
1 TABLET ORAL ONCE
Status: COMPLETED | OUTPATIENT
Start: 2024-07-27 | End: 2024-07-27

## 2024-07-27 RX ADMIN — SODIUM CHLORIDE, POTASSIUM CHLORIDE, SODIUM LACTATE AND CALCIUM CHLORIDE 1000 ML: 600; 310; 30; 20 INJECTION, SOLUTION INTRAVENOUS at 20:16

## 2024-07-27 RX ADMIN — KETOROLAC TROMETHAMINE 15 MG: 15 INJECTION, SOLUTION INTRAMUSCULAR; INTRAVENOUS at 19:37

## 2024-07-27 RX ADMIN — SODIUM CHLORIDE, POTASSIUM CHLORIDE, SODIUM LACTATE AND CALCIUM CHLORIDE 1000 ML: 600; 310; 30; 20 INJECTION, SOLUTION INTRAVENOUS at 22:45

## 2024-07-27 RX ADMIN — MORPHINE SULFATE 4 MG: 4 INJECTION, SOLUTION INTRAMUSCULAR; INTRAVENOUS at 19:36

## 2024-07-27 RX ADMIN — HYDROCODONE BITARTRATE AND ACETAMINOPHEN 1 TABLET: 5; 325 TABLET ORAL at 23:04

## 2024-07-27 RX ADMIN — IOHEXOL 80 ML: 350 INJECTION, SOLUTION INTRAVENOUS at 19:51

## 2024-07-27 RX ADMIN — DEXAMETHASONE SODIUM PHOSPHATE 16 MG: 4 INJECTION INTRA-ARTICULAR; INTRALESIONAL; INTRAMUSCULAR; INTRAVENOUS; SOFT TISSUE at 19:37

## 2024-07-27 ASSESSMENT — PAIN DESCRIPTION - PAIN TYPE: TYPE: ACUTE PAIN

## 2024-07-27 ASSESSMENT — FIBROSIS 4 INDEX: FIB4 SCORE: 0.74

## 2024-07-28 RX ORDER — LEVOTHYROXINE SODIUM 112 MCG
112 TABLET ORAL
Qty: 90 TABLET | Refills: 0 | Status: SHIPPED | OUTPATIENT
Start: 2024-07-28

## 2024-07-28 RX ORDER — LIOTHYRONINE SODIUM 5 UG/1
5 TABLET ORAL 2 TIMES DAILY
Qty: 180 TABLET | Refills: 0 | Status: SHIPPED | OUTPATIENT
Start: 2024-07-28

## 2024-07-28 NOTE — ED PROVIDER NOTES
ED Provider Note    CHIEF COMPLAINT  Chief Complaint   Patient presents with    Post-Op Complications       EXTERNAL RECORDS REVIEWED  Outpatient Notes oncology visit from 2/8/2024 patient seen in follow-up for triple negative left-sided breast cancer diagnosed in 2017    HPI/ROS  LIMITATION TO HISTORY     OUTSIDE HISTORIAN(S):      Pebbles Cosme is a 44 y.o. female who presents with dysphagia and sore throat.  Patient says that 9 days ago she had tonsillectomy.  Patient says she had reasonable pain for the first 4 to 5 days.  Patient says over the past 2 to 3 days she has had worsening right-sided throat pain.  Patient says she has had pain with swallowing only able to keep liquids down.  Patient denies fever, chills bleeding nausea, vomiting.  Patient's been taking Norco and ibuprofen as prescribed.    PAST MEDICAL HISTORY   has a past medical history of Anxiety, Cancer (HCC) (11/2017), Pain, and Psychiatric problem.    SURGICAL HISTORY   has a past surgical history that includes open reduction; other (10/11/2017); other (04/2018); other (03/2018); other; flap graft (Right, 5/16/2018); breast implant removal (Right, 6/18/2018); and abdominal hysterectomy total.    FAMILY HISTORY  Family History   Problem Relation Age of Onset    Cancer Mother 45        ovarian ca    Hypertension Father     Heart Disease Father     Hyperlipidemia Father        SOCIAL HISTORY  Social History     Tobacco Use    Smoking status: Light Smoker     Current packs/day: 0.03     Types: Cigarettes    Smokeless tobacco: Never    Tobacco comments:     rare/ once a week    Vaping Use    Vaping status: Never Used   Substance and Sexual Activity    Alcohol use: Yes     Comment: ocassional    Drug use: No    Sexual activity: Not Currently     Partners: Male     Birth control/protection: Condom       CURRENT MEDICATIONS  Home Medications       Reviewed by Erlinda Henderson R.N. (Registered Nurse) on 07/27/24 at 5246  Med List Status:  "Partial     Medication Last Dose Status   ALPRAZolam (XANAX) 0.25 MG Tab  Active   amphetamine-dextroamphetamine (ADDERALL) 10 MG Tab  Active   Ascorbic Acid (VITAMIN C) Powder  Active   buPROPion (WELLBUTRIN XL) 300 MG XL tablet  Active   Cholecalciferol 4000 units Cap  Active   estradiol (ESTRACE) 0.1 MG/GM vaginal cream  Active   estradiol (ESTRACE) 2 MG Tab  Active   liothyronine (CYTOMEL) 5 MCG Tab  Active   Melatonin 10 MG Cap  Active   Multiple Vitamins-Minerals (MULTIVITAMIN ADULT PO)  Active   omeprazole (PRILOSEC) 20 MG delayed-release capsule  Active   SYNTHROID 112 MCG Tab  Active   TURMERIC CURCUMIN PO  Active   valacyclovir (VALTREX) 1 GM Tab  Active                    ALLERGIES  No Known Allergies    PHYSICAL EXAM  VITAL SIGNS: BP (!) 158/99   Pulse 65   Temp 36 °C (96.8 °F) (Temporal)   Resp 18   Ht 1.626 m (5' 4\")   Wt 84.4 kg (186 lb 1.1 oz)   LMP 11/01/2017 (Approximate) Comment: last dose chemo 3/12/18.  SpO2 98%   BMI 31.94 kg/m²    Constitutional: Alert in no apparent distress.  HENT: No signs of trauma, Bilateral external ears normal, Nose normal.  Uvula is midline, slight pharyngeal erythema with large amount of epithelial tissue, no submandibular or sublingual swelling or tenderness, no trismus, no stridor, normal voice, tolerating secretions, no mastoid swelling or tenderness  Eyes: Pupils are equal and reactive, Conjunctiva normal, Non-icteric.   Neck: Normal range of motion, No tenderness, Supple, No stridor.   Lymphatic: No lymphadenopathy noted.   Cardiovascular: Regular rate and rhythm, no murmurs.   Thorax & Lungs: Normal breath sounds, No respiratory distress, No wheezing, No chest tenderness.   Abdomen: Bowel sounds normal, Soft, No tenderness, No masses, No pulsatile masses.   Skin: Warm, Dry, No erythema, No rash.   Back: No bony tenderness, No CVA tenderness.   Extremities: Intact distal pulses, No edema, No tenderness, No cyanosis  Musculoskeletal: Good range of motion " in all major joints. No tenderness to palpation or major deformities noted.   Neurologic: Alert , Normal motor function, Normal sensory function, No focal deficits noted.       EKG/LABS  Labs Reviewed   HCG QUAL SERUM   CBC WITH DIFFERENTIAL   BASIC METABOLIC PANEL   ESTIMATED GFR           RADIOLOGY/PROCEDURES   I have independently interpreted the diagnostic imaging associated with this visit and am waiting the final reading from the radiologist.   My preliminary interpretation is as follows: No free fluid    Radiologist interpretation:  CT-SOFT TISSUE NECK WITH   Final Result      1.  Small soft tissue gas in the right parapharyngeal space tracking cephalad toward the undersurface of the right temporal bone skull base.   2.  7 mm right vallecula soft tissue nodule, indeterminate. Recommend ENT consultation for direct visualization.          COURSE & MEDICAL DECISION MAKING    ASSESSMENT, COURSE AND PLAN  Care Narrative: On arrival patient noted to be hypertensive vital signs otherwise normal limits.  Patient has no signs of upper airway obstruction is nontoxic-appearing.  Patient has normal voice tolerating secretions with no trismus.  Patient appears uncomfortable and is tearful given patient's progressive pain will obtain CT imaging to assess for deep space infection.  Will initiate IV analgesics, fluids.    On rassessment patient reports significant improvement in symptoms.  Patient's been able to tolerate p.o. without difficulty.  Blood work is unremarkable with no leukocytosis or metabolic derangements.  CT imaging is showing small amount of soft tissue gas.  Spoke with Dr. Davies from ENT reviewed imaging and advises that CT imagings are likely post surgical changes and if patient has had significant symptomatic improvement with able to follow-up closely with his office.  No indication for antibiotics as patient is afebrile with good symptom control no leukocytosis.  Discussed results of testing with  patient, plan for outpatient follow-up and return precautions which she is comfortable with.            ADDITIONAL PROBLEMS MANAGED      DISPOSITION AND DISCUSSIONS  I have discussed management of the patient with the following physicians and DEANNE's: ENT      Decision tools and prescription drugs considered including, but not limited to: Pain Medications Toradol .    FINAL DIAGNOSIS  1. Post-op pain           Electronically signed by: Isma Dorsey D.O., 7/27/2024 7:00 PM

## 2024-07-28 NOTE — DISCHARGE INSTRUCTIONS
If you have uncontrolled pain, vomiting not able to eat or drink you should return to the emergency room.  We have given you prescription for Toradol which you can use as directed for pain do not use it with naproxen or ibuprofen.

## 2024-07-28 NOTE — ED TRIAGE NOTES
Vitals:    07/27/24 1741   BP: (!) 146/108   Pulse: 90   Resp: 16   Temp: 36.3 °C (97.4 °F)   SpO2: 96%     Chief Complaint   Patient presents with    Post-Op Complications     Pt had a tonsillectomy on both sides about a week and a half ago. She was doing okay but now she is having fairly severe pain in the right side of her throat and neck, especially when she eats. She reports tasting a very salty taste when she attempts to eat anything.     Pt is ambulatory to and from triage and is alert and oriented x 4.

## 2024-07-28 NOTE — ED NOTES
Bedside report received from off going RN/tech: Denae, assumed care of patient.  POC discussed with patient. Call light within reach, all needs addressed at this time.       Fall risk interventions in place: Patient's personal possessions are with in their safe reach, Place socks on patient, Place fall risk sign on patient's door, Keep floor surfaces clean and dry, and Accompanied to restroom (all applicable per [facility] risk assessment)   Continuous monitoring: Pulse Ox or Blood Pressure  IVF/IV medications: Not Applicable   Oxygen: Room Air  Bedside sitter: Not Applicable   Isolation: Not Applicable

## 2024-08-05 ENCOUNTER — OFFICE VISIT (OUTPATIENT)
Dept: URGENT CARE | Facility: PHYSICIAN GROUP | Age: 44
End: 2024-08-05

## 2024-08-05 VITALS
SYSTOLIC BLOOD PRESSURE: 132 MMHG | OXYGEN SATURATION: 100 % | HEART RATE: 77 BPM | WEIGHT: 191.4 LBS | TEMPERATURE: 97.2 F | BODY MASS INDEX: 32.68 KG/M2 | HEIGHT: 64 IN | RESPIRATION RATE: 20 BRPM | DIASTOLIC BLOOD PRESSURE: 80 MMHG

## 2024-08-05 DIAGNOSIS — Z02.4 ENCOUNTER FOR DEPARTMENT OF TRANSPORTATION (DOT) EXAMINATION FOR DRIVING LICENSE RENEWAL: ICD-10-CM

## 2024-08-05 PROCEDURE — 7100 PR DOT PHYSICAL: Performed by: PHYSICIAN ASSISTANT

## 2024-08-05 ASSESSMENT — FIBROSIS 4 INDEX: FIB4 SCORE: 0.65

## 2024-08-05 NOTE — PROGRESS NOTES
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 All PMHx and supportive information reviewed. Please see scanned DOT documents for additional information.    Silvestre Nash PA-C

## 2024-10-09 ENCOUNTER — TELEPHONE (OUTPATIENT)
Dept: ENDOCRINOLOGY | Facility: MEDICAL CENTER | Age: 44
End: 2024-10-09
Payer: COMMERCIAL

## 2024-10-10 ENCOUNTER — OFFICE VISIT (OUTPATIENT)
Dept: ENDOCRINOLOGY | Facility: MEDICAL CENTER | Age: 44
End: 2024-10-10
Attending: INTERNAL MEDICINE
Payer: COMMERCIAL

## 2024-10-10 VITALS
BODY MASS INDEX: 31.76 KG/M2 | OXYGEN SATURATION: 98 % | HEART RATE: 85 BPM | WEIGHT: 186 LBS | HEIGHT: 64 IN | SYSTOLIC BLOOD PRESSURE: 140 MMHG | DIASTOLIC BLOOD PRESSURE: 98 MMHG

## 2024-10-10 DIAGNOSIS — E04.1 THYROID NODULE: ICD-10-CM

## 2024-10-10 DIAGNOSIS — E06.3 HASHIMOTO'S THYROIDITIS: ICD-10-CM

## 2024-10-10 DIAGNOSIS — E55.9 VITAMIN D DEFICIENCY: ICD-10-CM

## 2024-10-10 DIAGNOSIS — E03.8 SUBCLINICAL HYPOTHYROIDISM: ICD-10-CM

## 2024-10-10 PROCEDURE — 3077F SYST BP >= 140 MM HG: CPT | Performed by: INTERNAL MEDICINE

## 2024-10-10 PROCEDURE — 99214 OFFICE O/P EST MOD 30 MIN: CPT | Performed by: INTERNAL MEDICINE

## 2024-10-10 PROCEDURE — 99211 OFF/OP EST MAY X REQ PHY/QHP: CPT | Performed by: INTERNAL MEDICINE

## 2024-10-10 PROCEDURE — 3080F DIAST BP >= 90 MM HG: CPT | Performed by: INTERNAL MEDICINE

## 2024-10-10 RX ORDER — LIOTHYRONINE SODIUM 5 UG/1
5 TABLET ORAL 2 TIMES DAILY
Qty: 180 TABLET | Refills: 3 | Status: SHIPPED | OUTPATIENT
Start: 2024-10-10

## 2024-10-10 RX ORDER — LEVOTHYROXINE SODIUM 112 MCG
112 TABLET ORAL
Qty: 90 TABLET | Refills: 3 | Status: SHIPPED | OUTPATIENT
Start: 2024-10-10

## 2024-10-10 ASSESSMENT — FIBROSIS 4 INDEX: FIB4 SCORE: 0.65

## 2025-02-10 ENCOUNTER — TELEPHONE (OUTPATIENT)
Dept: ENDOCRINOLOGY | Facility: MEDICAL CENTER | Age: 45
End: 2025-02-10
Payer: COMMERCIAL

## 2025-02-10 NOTE — TELEPHONE ENCOUNTER
"Received Refill PA request via  EMR message for Synthroid 112mcg Tablet. (Quantity:30, Day Supply:30)  Insurance: [employer]  Member ID: [id number]  BIN: [id number]  PCN: [id number]  Group: [id number]   Ran Test claim via [place] & medication rejects \"Drug Not covered/Non-Formulary Drug\"    Prior Authorization has been submitted via Cover My Meds: Key ([id number])  Will follow up in 24-48 business hours.     Thank you,  Melba Nicolas, [facility]  Endocrinology Pharmacy Coordinator        "

## 2025-02-11 NOTE — TELEPHONE ENCOUNTER
Prior Authorization for Synthroid 112mcg Tablet  has been DENIED  Prior authorization was denied per the following: Clinical criteria was not met. Patient has to try and fail 2 or more covered formulary alternatives, which include:   Levothyroxine  Euthyrox  Levo-T  Levoxyl  Unithroid  (Provider only has documentation of Levothyroxine).   Prior Authorization denial reference number: [id number]  I called the patient @ [phone] to advise her of the denial. There was no answer and I left a general voice message with my call back number. I have advised provider as well.     Thank you,  Melba Nicolas, [facility]  Endocrinology Pharmacy Coordinator

## 2025-05-30 ENCOUNTER — TELEPHONE (OUTPATIENT)
Dept: ENDOCRINOLOGY | Facility: MEDICAL CENTER | Age: 45
End: 2025-05-30
Payer: COMMERCIAL

## 2025-05-30 NOTE — TELEPHONE ENCOUNTER
Called pt and left vm to reschedule appt with Dr. Benavides on 10/09/25 due to provider being out of office

## (undated) DEVICE — HEAD HOLDER JUNIOR/ADULT

## (undated) DEVICE — RESERVOIR SUCTION 100 CC - SILICONE (20EA/CA)

## (undated) DEVICE — SUTURE GENERAL

## (undated) DEVICE — CANISTER SUCTION 3000ML MECHANICAL FILTER AUTO SHUTOFF MEDI-VAC NONSTERILE LF DISP  (40EA/CA)

## (undated) DEVICE — STERI STRIP COMPOUND BENZOIN - TINCTURE 0.6ML WITH APPLICATOR (40EA/BX)

## (undated) DEVICE — LACTATED RINGERS INJ 1000 ML - (14EA/CA 60CA/PF)

## (undated) DEVICE — PROTECTOR ULNA NERVE - (36PR/CA)

## (undated) DEVICE — SET EXTENSION WITH 2 PORTS (48EA/CA) ***PART #2C8610 IS A SUBSTITUTE*****

## (undated) DEVICE — GAUZE FLUFF STERILE 2-PLY 36 X 36 (100EA/CA)

## (undated) DEVICE — TUBING CLEARLINK DUO-VENT - C-FLO (48EA/CA)

## (undated) DEVICE — KIT ANESTHESIA W/CIRCUIT & 3/LT BAG W/FILTER (20EA/CA)

## (undated) DEVICE — SUCTION INSTRUMENT YANKAUER BULBOUS TIP W/O VENT (50EA/CA)

## (undated) DEVICE — NEPTUNE 4 PORT MANIFOLD - (20/PK)

## (undated) DEVICE — ELECTRODE 850 FOAM ADHESIVE - HYDROGEL RADIOTRNSPRNT (50/PK)

## (undated) DEVICE — SUTURE 3-0 VICRYL PLUS SH - 8X 18 INCH (12/BX)

## (undated) DEVICE — CLOSURE SKIN STRIP 1/2 X 4 IN - (STERI STRIP) (50/BX 4BX/CA)

## (undated) DEVICE — CLIP SM INTNL HRZN TI ESCP LGT - (24EA/PK 25PK/BX)

## (undated) DEVICE — SYRINGE 10 ML CONTROL LL (25EA/BX 4BX/CA)

## (undated) DEVICE — BLADE SURGICAL #15 - (50/BX 3BX/CA)

## (undated) DEVICE — MASK ANESTHESIA ADULT  - (100/CA)

## (undated) DEVICE — SODIUM CHL IRRIGATION 0.9% 1000ML (12EA/CA)

## (undated) DEVICE — PACK MAJOR BASIN - (2EA/CA)

## (undated) DEVICE — PAD LAP STERILE 18 X 18 - (5/PK 40PK/CA)

## (undated) DEVICE — WATER IRRIGATION STERILE 1000ML (12EA/CA)

## (undated) DEVICE — SET LEADWIRE 5 LEAD BEDSIDE DISPOSABLE ECG (1SET OF 5/EA)

## (undated) DEVICE — GLOVE BIOGEL SZ 6.5 SURGICAL PF LTX (50PR/BX 4BX/CA)

## (undated) DEVICE — SUTURE 3-0 PROLENE SH 36 (36PK/BX)"

## (undated) DEVICE — KIT  I.V. START (100EA/CA)

## (undated) DEVICE — CLIP MED INTNL HRZN TI ESCP - (25/BX)

## (undated) DEVICE — KIT ROOM DECONTAMINATION

## (undated) DEVICE — SPONGE GAUZESTER 4 X 4 4PLY - (128PK/CA)

## (undated) DEVICE — GLOVE BIOGEL PI ORTHO SZ 6 SURGICAL PF LF (40PR/BX)

## (undated) DEVICE — BANDAGE ELASTIC STERILE MATRIX 6 X 10 (20EA/CA)

## (undated) DEVICE — DRAPE SURGICAL U 77X120 - (10/CA)

## (undated) DEVICE — GLOVE BIOGEL PI INDICATOR SZ 6.5 SURGICAL PF LF - (50/BX 4BX/CA)

## (undated) DEVICE — TUBE CONNECTING SUCTION - CLEAR PLASTIC STERILE 72 IN (50EA/CA)

## (undated) DEVICE — DRAIN JACKSON PRATT 15FR - (10EA/CA)

## (undated) DEVICE — PACK MINOR BASIN - (2EA/CA)

## (undated) DEVICE — SUTURE 4-0 MONOCRYL PLUS PS-2 - 27 INCH (36/BX)

## (undated) DEVICE — CANISTER SUCTION RIGID RED 1500CC (40EA/CA)

## (undated) DEVICE — SLEEVE, VASO, THIGH, MED

## (undated) DEVICE — ELECTRODE DUAL RETURN W/ CORD - (50/PK)

## (undated) DEVICE — SENSOR SPO2 NEO LNCS ADHESIVE (20/BX) SEE USER NOTES

## (undated) DEVICE — CATHETER IV 20 GA X 1-1/4 ---SURG.& SDS ONLY--- (50EA/BX)

## (undated) DEVICE — NEEDLE NON SAFETY HYPO 22 GA X 1 1/2 IN (100/BX)